# Patient Record
Sex: FEMALE | Race: BLACK OR AFRICAN AMERICAN | NOT HISPANIC OR LATINO | Employment: FULL TIME | RURAL
[De-identification: names, ages, dates, MRNs, and addresses within clinical notes are randomized per-mention and may not be internally consistent; named-entity substitution may affect disease eponyms.]

---

## 2021-09-02 RX ORDER — AMLODIPINE BESYLATE 10 MG/1
10 TABLET ORAL DAILY
COMMUNITY

## 2021-09-02 RX ORDER — OMEPRAZOLE 40 MG/1
40 CAPSULE, DELAYED RELEASE ORAL DAILY
COMMUNITY

## 2021-09-02 RX ORDER — CYCLOBENZAPRINE HCL 10 MG
10 TABLET ORAL 3 TIMES DAILY PRN
COMMUNITY

## 2021-09-02 RX ORDER — PNV NO.95/FERROUS FUM/FOLIC AC 28MG-0.8MG
100 TABLET ORAL DAILY
COMMUNITY

## 2021-09-02 RX ORDER — CETIRIZINE HYDROCHLORIDE 10 MG/1
10 TABLET ORAL DAILY
COMMUNITY

## 2021-09-02 RX ORDER — ALBUTEROL SULFATE 0.83 MG/ML
2.5 SOLUTION RESPIRATORY (INHALATION) EVERY 4 HOURS PRN
COMMUNITY

## 2021-09-02 RX ORDER — FLUTICASONE PROPIONATE 50 MCG
1 SPRAY, SUSPENSION (ML) NASAL DAILY
COMMUNITY

## 2021-09-02 RX ORDER — POTASSIUM CHLORIDE 750 MG/1
10 CAPSULE, EXTENDED RELEASE ORAL ONCE
COMMUNITY

## 2021-09-02 RX ORDER — ERGOCALCIFEROL 1.25 MG/1
50000 CAPSULE ORAL
COMMUNITY

## 2021-09-02 RX ORDER — BUDESONIDE AND FORMOTEROL FUMARATE DIHYDRATE 160; 4.5 UG/1; UG/1
2 AEROSOL RESPIRATORY (INHALATION) EVERY 12 HOURS
COMMUNITY

## 2021-09-02 RX ORDER — VALSARTAN AND HYDROCHLOROTHIAZIDE 320; 25 MG/1; MG/1
1 TABLET, FILM COATED ORAL DAILY
COMMUNITY

## 2021-09-02 RX ORDER — GINGER ROOT/GINGER ROOT EXT 262.5 MG
1 CAPSULE ORAL DAILY
COMMUNITY

## 2021-09-02 RX ORDER — LANOLIN ALCOHOL/MO/W.PET/CERES
400 CREAM (GRAM) TOPICAL 2 TIMES DAILY
COMMUNITY

## 2021-09-02 RX ORDER — ETODOLAC 400 MG/1
400 TABLET, FILM COATED ORAL 2 TIMES DAILY
COMMUNITY

## 2021-09-02 RX ORDER — TRAMADOL HYDROCHLORIDE 50 MG/1
50 TABLET ORAL EVERY 6 HOURS
COMMUNITY
End: 2021-10-04

## 2021-09-02 RX ORDER — ALBUTEROL SULFATE 90 UG/1
2 AEROSOL, METERED RESPIRATORY (INHALATION) EVERY 4 HOURS PRN
COMMUNITY

## 2021-09-03 ENCOUNTER — OFFICE VISIT (OUTPATIENT)
Dept: PAIN MEDICINE | Facility: CLINIC | Age: 51
End: 2021-09-03
Payer: COMMERCIAL

## 2021-09-03 VITALS
HEIGHT: 63 IN | BODY MASS INDEX: 33.84 KG/M2 | HEART RATE: 94 BPM | DIASTOLIC BLOOD PRESSURE: 97 MMHG | WEIGHT: 191 LBS | SYSTOLIC BLOOD PRESSURE: 166 MMHG

## 2021-09-03 DIAGNOSIS — M47.817 SPONDYLOSIS OF LUMBOSACRAL REGION WITHOUT MYELOPATHY OR RADICULOPATHY: Chronic | ICD-10-CM

## 2021-09-03 DIAGNOSIS — Z79.899 ENCOUNTER FOR LONG-TERM (CURRENT) USE OF OTHER MEDICATIONS: Primary | ICD-10-CM

## 2021-09-03 DIAGNOSIS — Z11.59 SCREENING FOR VIRAL DISEASE: ICD-10-CM

## 2021-09-03 DIAGNOSIS — M54.50 CHRONIC BILATERAL LOW BACK PAIN, UNSPECIFIED WHETHER SCIATICA PRESENT: Chronic | ICD-10-CM

## 2021-09-03 DIAGNOSIS — G89.4 CHRONIC PAIN DISORDER: Chronic | ICD-10-CM

## 2021-09-03 DIAGNOSIS — G89.29 CHRONIC BILATERAL LOW BACK PAIN, UNSPECIFIED WHETHER SCIATICA PRESENT: Chronic | ICD-10-CM

## 2021-09-03 LAB

## 2021-09-03 PROCEDURE — 99203 PR OFFICE/OUTPT VISIT, NEW, LEVL III, 30-44 MIN: ICD-10-PCS | Mod: S$PBB,,, | Performed by: PAIN MEDICINE

## 2021-09-03 PROCEDURE — 80324 DRUG SCREEN AMPHETAMINES 1/2: CPT | Mod: ,,, | Performed by: CLINICAL MEDICAL LABORATORY

## 2021-09-03 PROCEDURE — 80354 DRUG SCREENING FENTANYL: CPT | Mod: ,,, | Performed by: CLINICAL MEDICAL LABORATORY

## 2021-09-03 PROCEDURE — 80365 DRUG SCREENING OXYCODONE: CPT | Mod: ,,, | Performed by: CLINICAL MEDICAL LABORATORY

## 2021-09-03 PROCEDURE — 80348 DRUG SCREEN DEFINITIVE 14, URINE: ICD-10-PCS | Mod: ,,, | Performed by: CLINICAL MEDICAL LABORATORY

## 2021-09-03 PROCEDURE — 80353 DRUG SCREEN DEFINITIVE 14, URINE: ICD-10-PCS | Mod: ,,, | Performed by: CLINICAL MEDICAL LABORATORY

## 2021-09-03 PROCEDURE — 80373 DRUG SCREENING TRAMADOL: CPT | Mod: ,,, | Performed by: CLINICAL MEDICAL LABORATORY

## 2021-09-03 PROCEDURE — 80346 DRUG SCREEN DEFINITIVE 14, URINE: ICD-10-PCS | Mod: ,,, | Performed by: CLINICAL MEDICAL LABORATORY

## 2021-09-03 PROCEDURE — 80372 DRUG SCREEN DEFINITIVE 14, URINE: ICD-10-PCS | Mod: ,,, | Performed by: CLINICAL MEDICAL LABORATORY

## 2021-09-03 PROCEDURE — 80324 DRUG SCREEN DEFINITIVE 14, URINE: ICD-10-PCS | Mod: ,,, | Performed by: CLINICAL MEDICAL LABORATORY

## 2021-09-03 PROCEDURE — 80349 DRUG SCREEN DEFINITIVE 14, URINE: ICD-10-PCS | Mod: ,,, | Performed by: CLINICAL MEDICAL LABORATORY

## 2021-09-03 PROCEDURE — 80373 DRUG SCREEN DEFINITIVE 14, URINE: ICD-10-PCS | Mod: ,,, | Performed by: CLINICAL MEDICAL LABORATORY

## 2021-09-03 PROCEDURE — 80365 DRUG SCREEN DEFINITIVE 14, URINE: ICD-10-PCS | Mod: ,,, | Performed by: CLINICAL MEDICAL LABORATORY

## 2021-09-03 PROCEDURE — 80353 DRUG SCREENING COCAINE: CPT | Mod: ,,, | Performed by: CLINICAL MEDICAL LABORATORY

## 2021-09-03 PROCEDURE — 99215 OFFICE O/P EST HI 40 MIN: CPT | Mod: PBBFAC | Performed by: PAIN MEDICINE

## 2021-09-03 PROCEDURE — 80349 CANNABINOIDS NATURAL: CPT | Mod: ,,, | Performed by: CLINICAL MEDICAL LABORATORY

## 2021-09-03 PROCEDURE — 99203 OFFICE O/P NEW LOW 30 MIN: CPT | Mod: S$PBB,,, | Performed by: PAIN MEDICINE

## 2021-09-03 PROCEDURE — 80372 DRUG SCREENING TAPENTADOL: CPT | Mod: ,,, | Performed by: CLINICAL MEDICAL LABORATORY

## 2021-09-03 PROCEDURE — 80358 DRUG SCREENING METHADONE: CPT | Mod: ,,, | Performed by: CLINICAL MEDICAL LABORATORY

## 2021-09-03 PROCEDURE — 80361 DRUG SCREEN DEFINITIVE 14, URINE: ICD-10-PCS | Mod: ,,, | Performed by: CLINICAL MEDICAL LABORATORY

## 2021-09-03 PROCEDURE — 80358 DRUG SCREEN DEFINITIVE 14, URINE: ICD-10-PCS | Mod: ,,, | Performed by: CLINICAL MEDICAL LABORATORY

## 2021-09-03 PROCEDURE — 80356 DRUG SCREEN DEFINITIVE 14, URINE: ICD-10-PCS | Mod: ,,, | Performed by: CLINICAL MEDICAL LABORATORY

## 2021-09-03 PROCEDURE — 80305 DRUG TEST PRSMV DIR OPT OBS: CPT | Mod: PBBFAC | Performed by: PAIN MEDICINE

## 2021-09-03 PROCEDURE — 80354 DRUG SCREEN DEFINITIVE 14, URINE: ICD-10-PCS | Mod: ,,, | Performed by: CLINICAL MEDICAL LABORATORY

## 2021-09-03 PROCEDURE — 80346 BENZODIAZEPINES1-12: CPT | Mod: ,,, | Performed by: CLINICAL MEDICAL LABORATORY

## 2021-09-03 PROCEDURE — 80348 DRUG SCREENING BUPRENORPHINE: CPT | Mod: ,,, | Performed by: CLINICAL MEDICAL LABORATORY

## 2021-09-03 PROCEDURE — 80361 OPIATES 1 OR MORE: CPT | Mod: ,,, | Performed by: CLINICAL MEDICAL LABORATORY

## 2021-09-03 PROCEDURE — 80356 HEROIN METABOLITE: CPT | Mod: ,,, | Performed by: CLINICAL MEDICAL LABORATORY

## 2021-09-13 LAB
6-ACETYLMORPHINE, URINE (RUSH): NEGATIVE 10 NG/ML
7-AMINOCLONAZEPAM, URINE (RUSH): NEGATIVE 25 NG/ML
A-HYDROXYALPRAZOLAM, URINE (RUSH): NEGATIVE 25 NG/ML
ACETYL FENTANYL, URINE (RUSH): NEGATIVE 2.5 NG/ML
ACETYL NORFENTANYL OXALATE, URINE (RUSH): NEGATIVE 5 NG/ML
AMPHET UR QL SCN: NEGATIVE 100 NG/ML
BENZOYLECGONINE, URINE (RUSH): NEGATIVE 100 NG/ML
BUPRENORPHINE UR QL SCN: NEGATIVE 25 NG/ML
CODEINE, URINE (RUSH): NEGATIVE 25 NG/ML
CREAT UR-MCNC: 21 MG/DL (ref 28–219)
EDDP, URINE (RUSH): NEGATIVE 25 NG/ML
FENTANYL, URINE (RUSH): NEGATIVE 2.5 NG/ML
HYDROCODONE, URINE (RUSH): NEGATIVE 25 NG/ML
HYDROMORPHONE, URINE (RUSH): NEGATIVE 25 NG/ML
LORAZEPAM, URINE (RUSH): NEGATIVE 25 NG/ML
METHADONE UR QL SCN: NEGATIVE 25 NG/ML
METHAMPHET UR QL SCN: NEGATIVE 100 NG/ML
MORPHINE, URINE (RUSH): NEGATIVE 25 NG/ML
NORBUPRENORPHINE, URINE (RUSH): NEGATIVE 25 NG/ML
NORDIAZEPAM, URINE (RUSH): NEGATIVE 25 NG/ML
NORFENTANYL OXALATE, URINE (RUSH): NEGATIVE 5 NG/ML
NORHYDROCODONE, URINE (RUSH): NEGATIVE 50 NG/ML
NOROXYCODONE HCL, URINE (RUSH): NEGATIVE 50 NG/ML
OXAZEPAM, URINE (RUSH): NEGATIVE 25 NG/ML
OXYCODONE UR QL SCN: NEGATIVE 25 NG/ML
OXYMORPHONE, URINE (RUSH): NEGATIVE 25 NG/ML
PH UR STRIP: 6 PH UNITS
REFRACTOMETER, URINE TOX: 1
SP GR UR STRIP: <=1.005
TAPENTADOL, URINE (RUSH): NEGATIVE 25 NG/ML
TEMAZEPAM, URINE (RUSH): NEGATIVE 25 NG/ML
THC-COOH, URINE (RUSH): NEGATIVE 25 NG/ML
TRAMADOL, URINE (RUSH): >1000 100 NG/ML

## 2021-09-23 ENCOUNTER — HOSPITAL ENCOUNTER (OUTPATIENT)
Facility: HOSPITAL | Age: 51
Discharge: HOME | End: 2021-09-23
Attending: PAIN MEDICINE | Admitting: PAIN MEDICINE
Payer: COMMERCIAL

## 2021-09-23 ENCOUNTER — ANESTHESIA EVENT (OUTPATIENT)
Dept: PAIN MEDICINE | Facility: HOSPITAL | Age: 51
End: 2021-09-23
Payer: COMMERCIAL

## 2021-09-23 ENCOUNTER — ANESTHESIA (OUTPATIENT)
Dept: PAIN MEDICINE | Facility: HOSPITAL | Age: 51
End: 2021-09-23
Payer: COMMERCIAL

## 2021-09-23 VITALS
SYSTOLIC BLOOD PRESSURE: 124 MMHG | DIASTOLIC BLOOD PRESSURE: 91 MMHG | HEIGHT: 63 IN | TEMPERATURE: 98 F | OXYGEN SATURATION: 100 % | WEIGHT: 190.19 LBS | OXYGEN SATURATION: 98 % | HEART RATE: 81 BPM | BODY MASS INDEX: 33.7 KG/M2 | DIASTOLIC BLOOD PRESSURE: 63 MMHG | RESPIRATION RATE: 11 BRPM | HEART RATE: 91 BPM | SYSTOLIC BLOOD PRESSURE: 108 MMHG

## 2021-09-23 DIAGNOSIS — M47.817 LUMBOSACRAL SPONDYLOSIS WITHOUT MYELOPATHY: ICD-10-CM

## 2021-09-23 PROCEDURE — 27000716 HC OXISENSOR PROBE, ANY SIZE: Performed by: NURSE ANESTHETIST, CERTIFIED REGISTERED

## 2021-09-23 PROCEDURE — 37000009 HC ANESTHESIA EA ADD 15 MINS: Performed by: PAIN MEDICINE

## 2021-09-23 PROCEDURE — 64494 PR INJ DX/THER AGNT PARAVERT FACET JOINT,IMG GUIDE,LUMBAR/SAC, 2ND LEVEL: ICD-10-PCS | Mod: 50,,, | Performed by: PAIN MEDICINE

## 2021-09-23 PROCEDURE — 25000003 PHARM REV CODE 250: Performed by: PAIN MEDICINE

## 2021-09-23 PROCEDURE — 27000284 HC CANNULA NASAL: Performed by: NURSE ANESTHETIST, CERTIFIED REGISTERED

## 2021-09-23 PROCEDURE — 64494 INJ PARAVERT F JNT L/S 2 LEV: CPT | Mod: 50,,, | Performed by: PAIN MEDICINE

## 2021-09-23 PROCEDURE — 63600175 PHARM REV CODE 636 W HCPCS: Performed by: PAIN MEDICINE

## 2021-09-23 PROCEDURE — 25000003 PHARM REV CODE 250: Performed by: NURSE ANESTHETIST, CERTIFIED REGISTERED

## 2021-09-23 PROCEDURE — 64495 INJ PARAVERT F JNT L/S 3 LEV: CPT | Mod: 50 | Performed by: PAIN MEDICINE

## 2021-09-23 PROCEDURE — 27201423 OPTIME MED/SURG SUP & DEVICES STERILE SUPPLY: Performed by: PAIN MEDICINE

## 2021-09-23 PROCEDURE — 64493 INJ PARAVERT F JNT L/S 1 LEV: CPT | Mod: 50 | Performed by: PAIN MEDICINE

## 2021-09-23 PROCEDURE — 64495 PR INJ DX/THER AGNT PARAVERT FACET JOINT,IMG GUIDE,LUMBAR/SAC, ADD LEVEL: ICD-10-PCS | Mod: 50,,, | Performed by: PAIN MEDICINE

## 2021-09-23 PROCEDURE — 64493 INJ PARAVERT F JNT L/S 1 LEV: CPT | Mod: 50,,, | Performed by: PAIN MEDICINE

## 2021-09-23 PROCEDURE — 37000008 HC ANESTHESIA 1ST 15 MINUTES: Performed by: PAIN MEDICINE

## 2021-09-23 PROCEDURE — 64493 PR INJ DX/THER AGNT PARAVERT FACET JOINT,IMG GUIDE,LUMBAR/SAC,1ST LVL: ICD-10-PCS | Mod: 50,,, | Performed by: PAIN MEDICINE

## 2021-09-23 PROCEDURE — D9220A PRA ANESTHESIA: ICD-10-PCS | Mod: ,,, | Performed by: NURSE ANESTHETIST, CERTIFIED REGISTERED

## 2021-09-23 PROCEDURE — 64494 INJ PARAVERT F JNT L/S 2 LEV: CPT | Mod: 50 | Performed by: PAIN MEDICINE

## 2021-09-23 PROCEDURE — D9220A PRA ANESTHESIA: Mod: ,,, | Performed by: NURSE ANESTHETIST, CERTIFIED REGISTERED

## 2021-09-23 PROCEDURE — 64495 INJ PARAVERT F JNT L/S 3 LEV: CPT | Mod: 50,,, | Performed by: PAIN MEDICINE

## 2021-09-23 PROCEDURE — 63600175 PHARM REV CODE 636 W HCPCS: Performed by: NURSE ANESTHETIST, CERTIFIED REGISTERED

## 2021-09-23 RX ORDER — BUPIVACAINE HYDROCHLORIDE 2.5 MG/ML
INJECTION, SOLUTION INFILTRATION; PERINEURAL
Status: DISCONTINUED | OUTPATIENT
Start: 2021-09-23 | End: 2021-09-23 | Stop reason: HOSPADM

## 2021-09-23 RX ORDER — TRIAMCINOLONE ACETONIDE 40 MG/ML
INJECTION, SUSPENSION INTRA-ARTICULAR; INTRAMUSCULAR
Status: DISCONTINUED | OUTPATIENT
Start: 2021-09-23 | End: 2021-09-23 | Stop reason: HOSPADM

## 2021-09-23 RX ORDER — TRAMADOL HYDROCHLORIDE 50 MG/1
50 TABLET ORAL
Qty: 30 TABLET | Refills: 0 | Status: SHIPPED | OUTPATIENT
Start: 2021-09-23 | End: 2021-10-13 | Stop reason: SDUPTHER

## 2021-09-23 RX ORDER — PROPOFOL 10 MG/ML
VIAL (ML) INTRAVENOUS
Status: DISCONTINUED | OUTPATIENT
Start: 2021-09-23 | End: 2021-09-23

## 2021-09-23 RX ORDER — LIDOCAINE HYDROCHLORIDE 20 MG/ML
INJECTION, SOLUTION EPIDURAL; INFILTRATION; INTRACAUDAL; PERINEURAL
Status: DISCONTINUED | OUTPATIENT
Start: 2021-09-23 | End: 2021-09-23

## 2021-09-23 RX ORDER — SODIUM CHLORIDE 9 MG/ML
INJECTION, SOLUTION INTRAVENOUS CONTINUOUS
Status: DISCONTINUED | OUTPATIENT
Start: 2021-09-23 | End: 2021-09-23 | Stop reason: HOSPADM

## 2021-09-23 RX ADMIN — PROPOFOL 20 MG: 10 INJECTION, EMULSION INTRAVENOUS at 11:09

## 2021-09-23 RX ADMIN — PROPOFOL 60 MG: 10 INJECTION, EMULSION INTRAVENOUS at 11:09

## 2021-09-23 RX ADMIN — PROPOFOL 70 MG: 10 INJECTION, EMULSION INTRAVENOUS at 11:09

## 2021-09-23 RX ADMIN — SODIUM CHLORIDE: 9 INJECTION, SOLUTION INTRAVENOUS at 11:09

## 2021-09-23 RX ADMIN — LIDOCAINE HYDROCHLORIDE 70 MG: 20 INJECTION, SOLUTION INTRAVENOUS at 11:09

## 2021-10-04 PROBLEM — M47.817 LUMBOSACRAL SPONDYLOSIS WITHOUT MYELOPATHY: Chronic | Status: ACTIVE | Noted: 2021-09-23

## 2021-10-04 PROBLEM — M89.49 OSTEOARTHROSIS MULTIPLE SITES, NOT SPECIFIED AS GENERALIZED: Chronic | Status: ACTIVE | Noted: 2021-10-04

## 2021-10-13 ENCOUNTER — OFFICE VISIT (OUTPATIENT)
Dept: PAIN MEDICINE | Facility: CLINIC | Age: 51
End: 2021-10-13
Payer: COMMERCIAL

## 2021-10-13 VITALS
RESPIRATION RATE: 18 BRPM | HEIGHT: 63 IN | BODY MASS INDEX: 33.49 KG/M2 | DIASTOLIC BLOOD PRESSURE: 90 MMHG | SYSTOLIC BLOOD PRESSURE: 173 MMHG | WEIGHT: 189 LBS | HEART RATE: 83 BPM

## 2021-10-13 DIAGNOSIS — M47.817 LUMBOSACRAL SPONDYLOSIS WITHOUT MYELOPATHY: Primary | Chronic | ICD-10-CM

## 2021-10-13 DIAGNOSIS — M89.49 OSTEOARTHROSIS MULTIPLE SITES, NOT SPECIFIED AS GENERALIZED: Chronic | ICD-10-CM

## 2021-10-13 PROCEDURE — 99214 PR OFFICE/OUTPT VISIT, EST, LEVL IV, 30-39 MIN: ICD-10-PCS | Mod: S$PBB,,, | Performed by: PHYSICIAN ASSISTANT

## 2021-10-13 PROCEDURE — 99215 OFFICE O/P EST HI 40 MIN: CPT | Mod: PBBFAC | Performed by: PHYSICIAN ASSISTANT

## 2021-10-13 PROCEDURE — 99214 OFFICE O/P EST MOD 30 MIN: CPT | Mod: S$PBB,,, | Performed by: PHYSICIAN ASSISTANT

## 2021-10-13 RX ORDER — TRAMADOL HYDROCHLORIDE 50 MG/1
50 TABLET ORAL
Qty: 30 TABLET | Refills: 0 | Status: SHIPPED | OUTPATIENT
Start: 2021-10-13 | End: 2021-11-11 | Stop reason: SDUPTHER

## 2021-10-26 ENCOUNTER — ANESTHESIA (OUTPATIENT)
Dept: PAIN MEDICINE | Facility: HOSPITAL | Age: 51
End: 2021-10-26
Payer: COMMERCIAL

## 2021-10-26 ENCOUNTER — ANESTHESIA EVENT (OUTPATIENT)
Dept: PAIN MEDICINE | Facility: HOSPITAL | Age: 51
End: 2021-10-26
Payer: COMMERCIAL

## 2021-10-26 ENCOUNTER — HOSPITAL ENCOUNTER (OUTPATIENT)
Facility: HOSPITAL | Age: 51
Discharge: HOME | End: 2021-10-26
Attending: PAIN MEDICINE | Admitting: PAIN MEDICINE
Payer: COMMERCIAL

## 2021-10-26 VITALS
SYSTOLIC BLOOD PRESSURE: 167 MMHG | DIASTOLIC BLOOD PRESSURE: 101 MMHG | OXYGEN SATURATION: 98 % | RESPIRATION RATE: 15 BRPM | TEMPERATURE: 99 F | BODY MASS INDEX: 33.73 KG/M2 | HEART RATE: 85 BPM | HEIGHT: 63 IN | WEIGHT: 190.38 LBS

## 2021-10-26 DIAGNOSIS — M47.816 SPONDYLOSIS OF LUMBAR REGION WITHOUT MYELOPATHY OR RADICULOPATHY: ICD-10-CM

## 2021-10-26 PROCEDURE — D9220A PRA ANESTHESIA: ICD-10-PCS | Mod: ,,, | Performed by: NURSE ANESTHETIST, CERTIFIED REGISTERED

## 2021-10-26 PROCEDURE — 64494 INJ PARAVERT F JNT L/S 2 LEV: CPT | Mod: 50 | Performed by: PAIN MEDICINE

## 2021-10-26 PROCEDURE — 64493 PR INJ DX/THER AGNT PARAVERT FACET JOINT,IMG GUIDE,LUMBAR/SAC,1ST LVL: ICD-10-PCS | Mod: 50,,, | Performed by: PAIN MEDICINE

## 2021-10-26 PROCEDURE — 63600175 PHARM REV CODE 636 W HCPCS: Performed by: NURSE ANESTHETIST, CERTIFIED REGISTERED

## 2021-10-26 PROCEDURE — 37000008 HC ANESTHESIA 1ST 15 MINUTES: Performed by: PAIN MEDICINE

## 2021-10-26 PROCEDURE — 25000003 PHARM REV CODE 250: Performed by: PAIN MEDICINE

## 2021-10-26 PROCEDURE — 64494 INJ PARAVERT F JNT L/S 2 LEV: CPT | Mod: 50,,, | Performed by: PAIN MEDICINE

## 2021-10-26 PROCEDURE — 63600175 PHARM REV CODE 636 W HCPCS: Performed by: PAIN MEDICINE

## 2021-10-26 PROCEDURE — 64493 INJ PARAVERT F JNT L/S 1 LEV: CPT | Mod: 50 | Performed by: PAIN MEDICINE

## 2021-10-26 PROCEDURE — 64493 INJ PARAVERT F JNT L/S 1 LEV: CPT | Mod: 50,,, | Performed by: PAIN MEDICINE

## 2021-10-26 PROCEDURE — 64494 PR INJ DX/THER AGNT PARAVERT FACET JOINT,IMG GUIDE,LUMBAR/SAC, 2ND LEVEL: ICD-10-PCS | Mod: 50,,, | Performed by: PAIN MEDICINE

## 2021-10-26 PROCEDURE — 25500020 PHARM REV CODE 255: Performed by: PAIN MEDICINE

## 2021-10-26 PROCEDURE — D9220A PRA ANESTHESIA: Mod: ,,, | Performed by: NURSE ANESTHETIST, CERTIFIED REGISTERED

## 2021-10-26 PROCEDURE — 27000284 HC CANNULA NASAL: Performed by: NURSE ANESTHETIST, CERTIFIED REGISTERED

## 2021-10-26 PROCEDURE — 25000003 PHARM REV CODE 250: Performed by: NURSE ANESTHETIST, CERTIFIED REGISTERED

## 2021-10-26 PROCEDURE — 27201423 OPTIME MED/SURG SUP & DEVICES STERILE SUPPLY: Performed by: PAIN MEDICINE

## 2021-10-26 RX ORDER — LIDOCAINE HYDROCHLORIDE 20 MG/ML
INJECTION, SOLUTION EPIDURAL; INFILTRATION; INTRACAUDAL; PERINEURAL
Status: DISCONTINUED | OUTPATIENT
Start: 2021-10-26 | End: 2021-10-26

## 2021-10-26 RX ORDER — SODIUM CHLORIDE 9 MG/ML
INJECTION, SOLUTION INTRAVENOUS CONTINUOUS
Status: DISCONTINUED | OUTPATIENT
Start: 2021-10-26 | End: 2021-10-26 | Stop reason: HOSPADM

## 2021-10-26 RX ORDER — BUPIVACAINE HYDROCHLORIDE 2.5 MG/ML
INJECTION, SOLUTION INFILTRATION; PERINEURAL
Status: DISCONTINUED | OUTPATIENT
Start: 2021-10-26 | End: 2021-10-26 | Stop reason: HOSPADM

## 2021-10-26 RX ORDER — PROPOFOL 10 MG/ML
INJECTION, EMULSION INTRAVENOUS
Status: DISCONTINUED | OUTPATIENT
Start: 2021-10-26 | End: 2021-10-26

## 2021-10-26 RX ORDER — TRIAMCINOLONE ACETONIDE 40 MG/ML
INJECTION, SUSPENSION INTRA-ARTICULAR; INTRAMUSCULAR
Status: DISCONTINUED | OUTPATIENT
Start: 2021-10-26 | End: 2021-10-26 | Stop reason: HOSPADM

## 2021-10-26 RX ADMIN — PROPOFOL 100 MG: 10 INJECTION, EMULSION INTRAVENOUS at 02:10

## 2021-10-26 RX ADMIN — PROPOFOL 30 MG: 10 INJECTION, EMULSION INTRAVENOUS at 02:10

## 2021-10-26 RX ADMIN — SODIUM CHLORIDE: 9 INJECTION, SOLUTION INTRAVENOUS at 02:10

## 2021-10-26 RX ADMIN — LIDOCAINE HYDROCHLORIDE 50 MG: 20 INJECTION, SOLUTION INTRAVENOUS at 02:10

## 2021-10-26 RX ADMIN — PROPOFOL 50 MG: 10 INJECTION, EMULSION INTRAVENOUS at 02:10

## 2021-11-11 ENCOUNTER — OFFICE VISIT (OUTPATIENT)
Dept: PAIN MEDICINE | Facility: CLINIC | Age: 51
End: 2021-11-11
Payer: COMMERCIAL

## 2021-11-11 VITALS
HEIGHT: 63 IN | RESPIRATION RATE: 20 BRPM | BODY MASS INDEX: 33.84 KG/M2 | WEIGHT: 191 LBS | HEART RATE: 83 BPM | DIASTOLIC BLOOD PRESSURE: 115 MMHG | SYSTOLIC BLOOD PRESSURE: 196 MMHG

## 2021-11-11 DIAGNOSIS — M89.49 OSTEOARTHROSIS MULTIPLE SITES, NOT SPECIFIED AS GENERALIZED: Chronic | ICD-10-CM

## 2021-11-11 DIAGNOSIS — M47.817 LUMBOSACRAL SPONDYLOSIS WITHOUT MYELOPATHY: Primary | Chronic | ICD-10-CM

## 2021-11-11 PROCEDURE — 99214 OFFICE O/P EST MOD 30 MIN: CPT | Mod: S$PBB,,, | Performed by: PHYSICIAN ASSISTANT

## 2021-11-11 PROCEDURE — 99214 PR OFFICE/OUTPT VISIT, EST, LEVL IV, 30-39 MIN: ICD-10-PCS | Mod: S$PBB,,, | Performed by: PHYSICIAN ASSISTANT

## 2021-11-11 PROCEDURE — 99215 OFFICE O/P EST HI 40 MIN: CPT | Mod: PBBFAC | Performed by: PHYSICIAN ASSISTANT

## 2021-11-11 RX ORDER — TRAMADOL HYDROCHLORIDE 50 MG/1
50 TABLET ORAL EVERY 12 HOURS
Qty: 60 TABLET | Refills: 0 | Status: SHIPPED | OUTPATIENT
Start: 2021-11-17 | End: 2021-12-09 | Stop reason: SDUPTHER

## 2021-12-07 ENCOUNTER — TELEPHONE (OUTPATIENT)
Dept: PAIN MEDICINE | Facility: CLINIC | Age: 51
End: 2021-12-07
Payer: COMMERCIAL

## 2021-12-09 ENCOUNTER — ANESTHESIA (OUTPATIENT)
Dept: PAIN MEDICINE | Facility: HOSPITAL | Age: 51
End: 2021-12-09
Payer: COMMERCIAL

## 2021-12-09 ENCOUNTER — ANESTHESIA EVENT (OUTPATIENT)
Dept: PAIN MEDICINE | Facility: HOSPITAL | Age: 51
End: 2021-12-09
Payer: COMMERCIAL

## 2021-12-09 ENCOUNTER — HOSPITAL ENCOUNTER (OUTPATIENT)
Facility: HOSPITAL | Age: 51
Discharge: HOME | End: 2021-12-09
Attending: PAIN MEDICINE | Admitting: PAIN MEDICINE
Payer: COMMERCIAL

## 2021-12-09 VITALS
WEIGHT: 188 LBS | RESPIRATION RATE: 13 BRPM | DIASTOLIC BLOOD PRESSURE: 100 MMHG | SYSTOLIC BLOOD PRESSURE: 156 MMHG | OXYGEN SATURATION: 98 % | HEART RATE: 89 BPM | BODY MASS INDEX: 33.31 KG/M2 | HEIGHT: 63 IN | TEMPERATURE: 99 F

## 2021-12-09 VITALS
HEART RATE: 96 BPM | OXYGEN SATURATION: 99 % | RESPIRATION RATE: 25 BRPM | DIASTOLIC BLOOD PRESSURE: 87 MMHG | SYSTOLIC BLOOD PRESSURE: 136 MMHG

## 2021-12-09 DIAGNOSIS — M89.49 OSTEOARTHROSIS MULTIPLE SITES, NOT SPECIFIED AS GENERALIZED: Chronic | ICD-10-CM

## 2021-12-09 DIAGNOSIS — M47.817 LUMBOSACRAL SPONDYLOSIS WITHOUT MYELOPATHY: Chronic | ICD-10-CM

## 2021-12-09 DIAGNOSIS — M47.817 SPONDYLOSIS OF LUMBOSACRAL REGION WITHOUT MYELOPATHY OR RADICULOPATHY: ICD-10-CM

## 2021-12-09 PROCEDURE — D9220A PRA ANESTHESIA: ICD-10-PCS | Mod: ,,, | Performed by: NURSE ANESTHETIST, CERTIFIED REGISTERED

## 2021-12-09 PROCEDURE — 37000008 HC ANESTHESIA 1ST 15 MINUTES: Performed by: PAIN MEDICINE

## 2021-12-09 PROCEDURE — 64636 PR DESTROY L/S FACET JNT ADDL: ICD-10-PCS | Mod: RT,,, | Performed by: PAIN MEDICINE

## 2021-12-09 PROCEDURE — 64635 DESTROY LUMB/SAC FACET JNT: CPT | Mod: RT | Performed by: PAIN MEDICINE

## 2021-12-09 PROCEDURE — 63600175 PHARM REV CODE 636 W HCPCS: Performed by: PAIN MEDICINE

## 2021-12-09 PROCEDURE — 64635 PR DESTROY LUMB/SAC FACET JNT: ICD-10-PCS | Mod: RT,,, | Performed by: PAIN MEDICINE

## 2021-12-09 PROCEDURE — 25000003 PHARM REV CODE 250: Performed by: NURSE ANESTHETIST, CERTIFIED REGISTERED

## 2021-12-09 PROCEDURE — 25000003 PHARM REV CODE 250: Performed by: PAIN MEDICINE

## 2021-12-09 PROCEDURE — 27000284 HC CANNULA NASAL: Performed by: NURSE ANESTHETIST, CERTIFIED REGISTERED

## 2021-12-09 PROCEDURE — 64636 DESTROY L/S FACET JNT ADDL: CPT | Mod: RT,,, | Performed by: PAIN MEDICINE

## 2021-12-09 PROCEDURE — 63600175 PHARM REV CODE 636 W HCPCS: Performed by: NURSE ANESTHETIST, CERTIFIED REGISTERED

## 2021-12-09 PROCEDURE — 64635 DESTROY LUMB/SAC FACET JNT: CPT | Mod: RT,,, | Performed by: PAIN MEDICINE

## 2021-12-09 PROCEDURE — 64636 DESTROY L/S FACET JNT ADDL: CPT | Performed by: PAIN MEDICINE

## 2021-12-09 PROCEDURE — 37000009 HC ANESTHESIA EA ADD 15 MINS: Performed by: PAIN MEDICINE

## 2021-12-09 PROCEDURE — 27000716 HC OXISENSOR PROBE, ANY SIZE: Performed by: NURSE ANESTHETIST, CERTIFIED REGISTERED

## 2021-12-09 PROCEDURE — 27201423 OPTIME MED/SURG SUP & DEVICES STERILE SUPPLY: Performed by: PAIN MEDICINE

## 2021-12-09 PROCEDURE — D9220A PRA ANESTHESIA: Mod: ,,, | Performed by: NURSE ANESTHETIST, CERTIFIED REGISTERED

## 2021-12-09 RX ORDER — SODIUM CHLORIDE 9 MG/ML
INJECTION, SOLUTION INTRAVENOUS CONTINUOUS
Status: DISCONTINUED | OUTPATIENT
Start: 2021-12-09 | End: 2021-12-09

## 2021-12-09 RX ORDER — TRIAMCINOLONE ACETONIDE 40 MG/ML
INJECTION, SUSPENSION INTRA-ARTICULAR; INTRAMUSCULAR
Status: DISCONTINUED | OUTPATIENT
Start: 2021-12-09 | End: 2021-12-09 | Stop reason: HOSPADM

## 2021-12-09 RX ORDER — BUPIVACAINE HYDROCHLORIDE 2.5 MG/ML
INJECTION, SOLUTION INFILTRATION; PERINEURAL
Status: DISCONTINUED | OUTPATIENT
Start: 2021-12-09 | End: 2021-12-09 | Stop reason: HOSPADM

## 2021-12-09 RX ORDER — ORPHENADRINE CITRATE 30 MG/ML
INJECTION INTRAMUSCULAR; INTRAVENOUS
Status: DISCONTINUED | OUTPATIENT
Start: 2021-12-09 | End: 2021-12-09

## 2021-12-09 RX ORDER — TRAMADOL HYDROCHLORIDE 50 MG/1
50 TABLET ORAL EVERY 12 HOURS PRN
Qty: 60 TABLET | Refills: 0 | Status: SHIPPED | OUTPATIENT
Start: 2021-12-10 | End: 2022-01-10 | Stop reason: SDUPTHER

## 2021-12-09 RX ORDER — PROPOFOL 10 MG/ML
VIAL (ML) INTRAVENOUS
Status: DISCONTINUED | OUTPATIENT
Start: 2021-12-09 | End: 2021-12-09

## 2021-12-09 RX ORDER — LIDOCAINE HYDROCHLORIDE 20 MG/ML
INJECTION, SOLUTION EPIDURAL; INFILTRATION; INTRACAUDAL; PERINEURAL
Status: DISCONTINUED | OUTPATIENT
Start: 2021-12-09 | End: 2021-12-09

## 2021-12-09 RX ORDER — ONDANSETRON 2 MG/ML
4 INJECTION INTRAMUSCULAR; INTRAVENOUS ONCE
Status: COMPLETED | OUTPATIENT
Start: 2021-12-09 | End: 2021-12-09

## 2021-12-09 RX ORDER — SODIUM CHLORIDE 9 MG/ML
INJECTION, SOLUTION INTRAVENOUS CONTINUOUS
Status: ACTIVE | OUTPATIENT
Start: 2021-12-09

## 2021-12-09 RX ADMIN — LIDOCAINE HYDROCHLORIDE 70 MG: 20 INJECTION, SOLUTION INTRAVENOUS at 01:12

## 2021-12-09 RX ADMIN — SODIUM CHLORIDE: 9 INJECTION, SOLUTION INTRAVENOUS at 01:12

## 2021-12-09 RX ADMIN — PROPOFOL 70 MG: 10 INJECTION, EMULSION INTRAVENOUS at 01:12

## 2021-12-09 RX ADMIN — PROPOFOL 60 MG: 10 INJECTION, EMULSION INTRAVENOUS at 01:12

## 2021-12-09 RX ADMIN — ORPHENADRINE CITRATE 60 MG: 30 INJECTION INTRAMUSCULAR; INTRAVENOUS at 01:12

## 2021-12-09 RX ADMIN — ONDANSETRON 4 MG: 2 INJECTION INTRAMUSCULAR; INTRAVENOUS at 01:12

## 2021-12-23 ENCOUNTER — HOSPITAL ENCOUNTER (OUTPATIENT)
Facility: HOSPITAL | Age: 51
Discharge: HOME | End: 2021-12-23
Attending: PAIN MEDICINE | Admitting: PAIN MEDICINE
Payer: COMMERCIAL

## 2021-12-23 ENCOUNTER — ANESTHESIA EVENT (OUTPATIENT)
Dept: PAIN MEDICINE | Facility: HOSPITAL | Age: 51
End: 2021-12-23
Payer: COMMERCIAL

## 2021-12-23 ENCOUNTER — ANESTHESIA (OUTPATIENT)
Dept: PAIN MEDICINE | Facility: HOSPITAL | Age: 51
End: 2021-12-23
Payer: COMMERCIAL

## 2021-12-23 VITALS
TEMPERATURE: 99 F | HEART RATE: 87 BPM | WEIGHT: 189 LBS | DIASTOLIC BLOOD PRESSURE: 96 MMHG | HEIGHT: 63 IN | SYSTOLIC BLOOD PRESSURE: 132 MMHG | BODY MASS INDEX: 33.49 KG/M2 | SYSTOLIC BLOOD PRESSURE: 162 MMHG | RESPIRATION RATE: 19 BRPM | HEART RATE: 78 BPM | RESPIRATION RATE: 21 BRPM | OXYGEN SATURATION: 99 % | OXYGEN SATURATION: 100 % | DIASTOLIC BLOOD PRESSURE: 88 MMHG

## 2021-12-23 DIAGNOSIS — M47.817 SPONDYLOSIS OF LUMBOSACRAL REGION WITHOUT MYELOPATHY OR RADICULOPATHY: ICD-10-CM

## 2021-12-23 PROCEDURE — 64636 DESTROY L/S FACET JNT ADDL: CPT | Mod: 59,LT

## 2021-12-23 PROCEDURE — D9220A PRA ANESTHESIA: ICD-10-PCS | Mod: ,,, | Performed by: NURSE ANESTHETIST, CERTIFIED REGISTERED

## 2021-12-23 PROCEDURE — 64635 PR DESTROY LUMB/SAC FACET JNT: ICD-10-PCS | Mod: LT,,, | Performed by: PAIN MEDICINE

## 2021-12-23 PROCEDURE — 37000008 HC ANESTHESIA 1ST 15 MINUTES: Performed by: PAIN MEDICINE

## 2021-12-23 PROCEDURE — 64635 DESTROY LUMB/SAC FACET JNT: CPT | Mod: LT | Performed by: PAIN MEDICINE

## 2021-12-23 PROCEDURE — 27000284 HC CANNULA NASAL: Performed by: NURSE ANESTHETIST, CERTIFIED REGISTERED

## 2021-12-23 PROCEDURE — 64636 PR DESTROY L/S FACET JNT ADDL: ICD-10-PCS | Mod: LT,,, | Performed by: PAIN MEDICINE

## 2021-12-23 PROCEDURE — 37000009 HC ANESTHESIA EA ADD 15 MINS: Performed by: PAIN MEDICINE

## 2021-12-23 PROCEDURE — 64635 DESTROY LUMB/SAC FACET JNT: CPT | Mod: LT,,, | Performed by: PAIN MEDICINE

## 2021-12-23 PROCEDURE — 25000003 PHARM REV CODE 250: Performed by: NURSE ANESTHETIST, CERTIFIED REGISTERED

## 2021-12-23 PROCEDURE — 63600175 PHARM REV CODE 636 W HCPCS: Performed by: NURSE ANESTHETIST, CERTIFIED REGISTERED

## 2021-12-23 PROCEDURE — 64636 DESTROY L/S FACET JNT ADDL: CPT | Mod: LT | Performed by: PAIN MEDICINE

## 2021-12-23 PROCEDURE — 27000716 HC OXISENSOR PROBE, ANY SIZE: Performed by: NURSE ANESTHETIST, CERTIFIED REGISTERED

## 2021-12-23 PROCEDURE — 27201423 OPTIME MED/SURG SUP & DEVICES STERILE SUPPLY: Performed by: PAIN MEDICINE

## 2021-12-23 PROCEDURE — 64636 DESTROY L/S FACET JNT ADDL: CPT | Mod: LT,,, | Performed by: PAIN MEDICINE

## 2021-12-23 PROCEDURE — D9220A PRA ANESTHESIA: Mod: ,,, | Performed by: NURSE ANESTHETIST, CERTIFIED REGISTERED

## 2021-12-23 RX ORDER — ORPHENADRINE CITRATE 30 MG/ML
INJECTION INTRAMUSCULAR; INTRAVENOUS
Status: DISCONTINUED | OUTPATIENT
Start: 2021-12-23 | End: 2021-12-23

## 2021-12-23 RX ORDER — SODIUM CHLORIDE 9 MG/ML
INJECTION, SOLUTION INTRAVENOUS CONTINUOUS
Status: DISCONTINUED | OUTPATIENT
Start: 2021-12-23 | End: 2021-12-23 | Stop reason: HOSPADM

## 2021-12-23 RX ORDER — LIDOCAINE HYDROCHLORIDE 20 MG/ML
INJECTION, SOLUTION EPIDURAL; INFILTRATION; INTRACAUDAL; PERINEURAL
Status: DISCONTINUED | OUTPATIENT
Start: 2021-12-23 | End: 2021-12-23

## 2021-12-23 RX ORDER — PROPOFOL 10 MG/ML
VIAL (ML) INTRAVENOUS
Status: DISCONTINUED | OUTPATIENT
Start: 2021-12-23 | End: 2021-12-23

## 2021-12-23 RX ADMIN — PROPOFOL 40 MG: 10 INJECTION, EMULSION INTRAVENOUS at 04:12

## 2021-12-23 RX ADMIN — PROPOFOL 50 MG: 10 INJECTION, EMULSION INTRAVENOUS at 03:12

## 2021-12-23 RX ADMIN — PROPOFOL 40 MG: 10 INJECTION, EMULSION INTRAVENOUS at 03:12

## 2021-12-23 RX ADMIN — ORPHENADRINE CITRATE 60 MG: 30 INJECTION INTRAMUSCULAR; INTRAVENOUS at 03:12

## 2021-12-23 RX ADMIN — LIDOCAINE HYDROCHLORIDE 8 MG: 20 INJECTION, SOLUTION INTRAVENOUS at 03:12

## 2021-12-23 RX ADMIN — SODIUM CHLORIDE: 9 INJECTION, SOLUTION INTRAVENOUS at 03:12

## 2022-01-10 NOTE — PROGRESS NOTES
Disclaimer:  This note has been generated using voice recognition software.  There may be type of graft focal areas that have been missed during a proof reading      Subjective:      Patient ID: Miriam Winter is a 51 y.o. female.    Chief Complaint: Back Pain    Pain  This is a chronic problem. The current episode started more than 1 year ago. The problem occurs daily. The problem has been waxing and waning. Associated symptoms include arthralgias. Pertinent negatives include no change in bowel habit, chest pain, chills, coughing, diaphoresis, fatigue, neck pain, rash, sore throat, vertigo or vomiting.     Review of Systems   Constitutional: Negative for appetite change, chills, diaphoresis, fatigue and unexpected weight change.   HENT: Negative for drooling, ear discharge, ear pain, facial swelling, nosebleeds, sore throat, trouble swallowing, voice change and goiter.    Eyes: Negative for photophobia, pain, discharge, redness and visual disturbance.   Respiratory: Negative for apnea, cough, choking, chest tightness, shortness of breath, wheezing and stridor.    Cardiovascular: Negative for chest pain, palpitations and leg swelling.   Gastrointestinal: Negative for abdominal distention, change in bowel habit, diarrhea, rectal pain, vomiting, fecal incontinence and change in bowel habit.   Endocrine: Negative for cold intolerance, heat intolerance, polydipsia, polyphagia and polyuria.   Genitourinary: Negative for bladder incontinence, dysuria, flank pain, frequency and hot flashes.   Musculoskeletal: Positive for arthralgias, back pain and leg pain. Negative for neck pain and neck stiffness.   Integumentary:  Negative for color change, pallor and rash.   Allergic/Immunologic: Negative for immunocompromised state.   Neurological: Negative for dizziness, vertigo, seizures, syncope, facial asymmetry, speech difficulty, light-headedness, disturbances in coordination, memory loss and coordination difficulties.  "  Hematological: Negative for adenopathy. Does not bruise/bleed easily.   Psychiatric/Behavioral: Negative for agitation, behavioral problems, confusion, decreased concentration, dysphoric mood, hallucinations, self-injury and suicidal ideas. The patient is not nervous/anxious and is not hyperactive.             Objective:  Vitals:    01/11/22 1040   BP: (!) 177/112   Pulse: 86   Resp: 18   Weight: 82.6 kg (182 lb)   Height: 5' 3" (1.6 m)   PainSc: 0-No pain         Physical Exam  Vitals and nursing note reviewed. Exam conducted with a chaperone present.   Constitutional:       General: She is awake.      Appearance: Normal appearance. She is not ill-appearing or diaphoretic.   HENT:      Head: Normocephalic and atraumatic.      Nose: Nose normal.      Mouth/Throat:      Mouth: Mucous membranes are moist.      Pharynx: Oropharynx is clear.   Eyes:      Conjunctiva/sclera: Conjunctivae normal.      Pupils: Pupils are equal, round, and reactive to light.   Cardiovascular:      Rate and Rhythm: Normal rate.   Pulmonary:      Effort: Pulmonary effort is normal. No respiratory distress.   Abdominal:      Palpations: Abdomen is soft.      Tenderness: There is no guarding.   Musculoskeletal:         General: No signs of injury. Normal range of motion.      Cervical back: Normal range of motion and neck supple. No rigidity.   Skin:     General: Skin is warm and dry.      Coloration: Skin is not jaundiced or pale.   Neurological:      General: No focal deficit present.      Mental Status: She is alert and oriented to person, place, and time. Mental status is at baseline.      Cranial Nerves: Cranial nerves are intact. No cranial nerve deficit (II-XII).   Psychiatric:         Mood and Affect: Mood normal.         Behavior: Behavior normal. Behavior is cooperative.         Thought Content: Thought content normal.           [place] [facility] Pain [facility]  See OP Notes for results.     IMPRESSION: See OP Notes for results. "     This procedure was auto-finalized by: Virtual Radiologist       Office Visit on 09/03/2021   Component Date Value Ref Range Status    POC Amphetamines 09/03/2021 Negative  Negative, Inconclusive Final    POC Barbiturates 09/03/2021 Negative  Negative, Inconclusive Final    POC Benzodiazepines 09/03/2021 Negative  Negative, Inconclusive Final    POC Cocaine 09/03/2021 Negative  Negative, Inconclusive Final    POC THC 09/03/2021 Negative  Negative, Inconclusive Final    POC Methadone 09/03/2021 Negative  Negative, Inconclusive Final    POC Methamphetamine 09/03/2021 Negative  Negative, Inconclusive Final    POC Opiates 09/03/2021 Negative  Negative, Inconclusive Final    POC Oxycodone 09/03/2021 Negative  Negative, Inconclusive Final    POC Phencyclidine 09/03/2021 Negative  Negative, Inconclusive Final    POC Methylenedioxymethamphetamine * 09/03/2021 Negative  Negative, Inconclusive Final    POC Tricyclic Antidepressants 09/03/2021 Negative  Negative, Inconclusive Final    POC Buprenorphine 09/03/2021 Negative   Final     Acceptable 09/03/2021 Yes   Final    POC Temperature (Urine) 09/03/2021 96   Final    pH, UA 09/03/2021 6.0  5.0, 5.5, 6.0, 6.5, 7.0, 7.5, 8.0 pH Units Final    Specific Gravity, UA 09/03/2021 <=1.005  <=1.005, 1.010, 1.015, 1.020, 1.025, 1.030 Final    Creatinine, Urine 09/03/2021 21* 28 - 219 mg/dL Final    Refractometer, Urine 09/03/2021 1.004   Final    6-Acetylmorphine 09/03/2021 Negative  10 ng/mL Final    7-Aminoclonazepam 09/03/2021 Negative  Negative 25 ng/mL Final    a-Hydroxyalprazolam 09/03/2021 Negative  25 ng/mL Final    Acetyl Fentanyl 09/03/2021 Negative  2.5 ng/mL Final    Acetyl Norfentanyl Oxalate 09/03/2021 Negative  5 ng/mL Final    Benzoylecgonine 09/03/2021 Negative  100 ng/mL Final    Buprenorphine 09/03/2021 Negative  25 ng/mL Final    Codeine 09/03/2021 Negative  25 ng/mL Final    EDDP 09/03/2021 Negative  25 ng/mL Final     Fentanyl 09/03/2021 Negative  2.5 ng/mL Final    Hydrocodone 09/03/2021 Negative  25 ng/mL Final    Hydromorphone 09/03/2021 Negative  25 ng/mL Final    Lorazepam 09/03/2021 Negative  25 ng/mL Final    Morphine 09/03/2021 Negative  25 ng/mL Final    Norbuprenorphine 09/03/2021 Negative  25 ng/mL Final    Nordiazepam 09/03/2021 Negative  25 ng/mL Final    Norfentanyl Oxalate 09/03/2021 Negative  5 ng/mL Final    Norhydrocodone 09/03/2021 Negative  50 ng/mL Final    Noroxycodone HCL 09/03/2021 Negative  50 ng/mL Final    Oxazepam 09/03/2021 Negative  25 ng/mL Final    Oxymorphone 09/03/2021 Negative  25 ng/mL Final    Tapentadol 09/03/2021 Negative  25 ng/mL Final    Temazepam 09/03/2021 Negative  25 ng/mL Final    THC-COOH 09/03/2021 Negative  25 ng/mL Final    Tramadol 09/03/2021 >1,000.0* <100.0 100 ng/mL Final    Amphetamine, Urine 09/03/2021 Negative  Negative 100 ng/mL Final    Methamphetamines, Urine 09/03/2021 Negative  Negative 100 ng/mL Final    Methadone, Urine 09/03/2021 Negative  Negative 25 ng/mL Final    Oxycodone, Urine 09/03/2021 Negative  Negative 25 ng/mL Final           Assessment:      1. Lumbosacral spondylosis without myelopathy    2. Osteoarthrosis multiple sites, not specified as generalized    3. Encounter for long-term (current) use of other medications          Orders Placed This Encounter   Procedures    POCT Urine Drug Screen Presump     Interpretive Information:     Negative:  No drug detected at the cut off level.   Positive:  This result represents presumptive positive for the   tested drug, other substances may yield a positive response other   than the analyte of interest. This result should be utilized for   diagnostic purpose only. Confirmation testing will be performed upon physician request only.            A's of Opioid Risk Assessment  Activity:Patient can perform ADL.   Analgesia:Patients pain is partially controlled by current medication. Patient has tried  OTC medications such as Tylenol and Ibuprofen with out relief.   Adverse Effects: Patient denies constipation or sedation.  Aberrant Behavior:  reviewed with no aberrant drug seeking/taking behavior.  Overdose reversal drug naloxone discussed    Drug screen reviewed      Requested Prescriptions     Signed Prescriptions Disp Refills    traMADoL (ULTRAM) 50 mg tablet 60 tablet 1     Sig: Take 1 tablet (50 mg total) by mouth every 12 (twelve) hours.         Plan:    Presumptive drug screen negative, this is expected result, patient takes tramadol, cup does not test for tramadol    Follow-up after lumbar RFTC December 23, 2021, December 9, 2021, she states she had 80% relief initially maintaining 70% relief with time    At this point she would like to continue with conservative management    Continue home exercise program as directed    Tramadol 50 mg 1 p.o. q.12 hours+    Follow-up 2 months    Dr. Zambrano, December 2022    Bring original prescription medication bottles/container/box with labels to each visit

## 2022-01-11 ENCOUNTER — OFFICE VISIT (OUTPATIENT)
Dept: PAIN MEDICINE | Facility: CLINIC | Age: 52
End: 2022-01-11
Payer: COMMERCIAL

## 2022-01-11 VITALS
RESPIRATION RATE: 18 BRPM | HEIGHT: 63 IN | BODY MASS INDEX: 32.25 KG/M2 | SYSTOLIC BLOOD PRESSURE: 177 MMHG | HEART RATE: 86 BPM | DIASTOLIC BLOOD PRESSURE: 112 MMHG | WEIGHT: 182 LBS

## 2022-01-11 DIAGNOSIS — M47.817 LUMBOSACRAL SPONDYLOSIS WITHOUT MYELOPATHY: Primary | Chronic | ICD-10-CM

## 2022-01-11 DIAGNOSIS — M89.49 OSTEOARTHROSIS MULTIPLE SITES, NOT SPECIFIED AS GENERALIZED: Chronic | ICD-10-CM

## 2022-01-11 DIAGNOSIS — Z79.899 ENCOUNTER FOR LONG-TERM (CURRENT) USE OF OTHER MEDICATIONS: ICD-10-CM

## 2022-01-11 LAB

## 2022-01-11 PROCEDURE — 99215 OFFICE O/P EST HI 40 MIN: CPT | Mod: PBBFAC | Performed by: PHYSICIAN ASSISTANT

## 2022-01-11 PROCEDURE — 80305 DRUG TEST PRSMV DIR OPT OBS: CPT | Mod: PBBFAC | Performed by: PHYSICIAN ASSISTANT

## 2022-01-11 PROCEDURE — 99214 OFFICE O/P EST MOD 30 MIN: CPT | Mod: S$PBB,,, | Performed by: PHYSICIAN ASSISTANT

## 2022-01-11 PROCEDURE — 99214 PR OFFICE/OUTPT VISIT, EST, LEVL IV, 30-39 MIN: ICD-10-PCS | Mod: S$PBB,,, | Performed by: PHYSICIAN ASSISTANT

## 2022-01-11 RX ORDER — TRAMADOL HYDROCHLORIDE 50 MG/1
50 TABLET ORAL EVERY 12 HOURS
Qty: 60 TABLET | Refills: 1 | Status: SHIPPED | OUTPATIENT
Start: 2022-01-11 | End: 2022-03-02 | Stop reason: SDUPTHER

## 2022-01-11 NOTE — LETTER
January 11, 2022      [facility]  [address] [place] [address] [place] [address]  Phone: [phone]       Patient: Miriam Winter   YOB: 1970  Date of Visit: 01/11/2022    To Whom It May Concern:    Denise Winter  was at [facility] on 01/11/2022. The patient may return to work/school on 1/12/2022.   If you have any questions or concerns, or if I can be of further assistance, please do not hesitate to contact me.    Sincerely,        Emily Celis RN

## 2022-01-11 NOTE — PATIENT INSTRUCTIONS
Patient Education       Spondylolysis   About this topic   The spine is made up of bones called vertebrae. These bones are lined up on top of each other. The spinal bones have a large solid part called the body. There is also a michael arch that goes around your spinal cord. Spondylolysis is a tiny crack, or stress fracture, in this michael arch. This problem almost always happens in the lower back or the lumbar spine. This arch can crack on one or both sides. If it happens on both sides and it is not treated, it can lead to a more serious problem.     What are the causes?   · Too much stress on the bones in the spine. This often comes from playing sports like gymnastics, football, or weight-lifting.  · Having thin bones. This may be from birth.  · Trauma  · Degenerative problem  · Being overweight  What can make this more likely to happen?   · More common in children and teenagers  · Growth spurts  · Using poor techniques or improper equipment for sports or exercise  What are the main signs?   · Low back pain that is worse with activity and better with rest  · Pain in the buttocks, back of the thighs that may shoot down the leg  · Muscle spasms or tightness in the back or back of the thigh  · Some people have no signs  How does the doctor diagnose this health problem?   The doctor will do an exam and feel around your back. The doctor may have you move and push and pull on your legs to test your motion and strength. Your doctor may have you raise one leg straight up to check if the muscles in the back of your thigh are tight. Your doctor may check the feeling and reflexes in your legs to check for nerve problems. The doctor may order:  · X-ray  · CT or MRI scan  How does the doctor treat this health problem?   · Rest  · Back brace  · Exercises for strengthening and stretching  · Physical therapy (PT) for treatments to lessen pain and for instruction in exercises to help the problem  · Weight loss program if you are  overweight  · Surgery may be needed if pain does not get better or if other problems happen  What drugs may be needed?   The doctor may order drugs to:  · Help with pain and swelling  The doctor may give you a shot to help with pain and swelling. Talk with your doctor about the risks of this shot.   What problems could happen?   · A spinal bone could slip forward onto the spinal bone below. This is spondylolisthesis.  · Chronic back pain  · Nerve damage  · Need for surgery  What can be done to prevent this health problem?   · Stay active and work out to keep your muscles strong and flexible. Keep your back and belly muscles strong and your hamstring muscles flexible.  · Warm up slowly and stretch before you exercise. Use good training techniques and form for sports. Have an expert look at your technique.  · Wear the right equipment when playing sports.  · Use good ways to train, such as slowly adding to how many exercises you do.  · Take breaks often when doing things that use repeat movements.  · Do not exercise or play sports when you are tired or in pain.  · Use extra care in sports with a lot of back bending such as gymnastics, dancing, football, and wrestling.  · Eat a healthy diet to keep your muscles and bones healthy. Eat a diet rich in calcium and vitamin D to keep your bones strong.  · Keep a healthy weight so there is not extra stress on your joints.  Helpful tips   · If you have back pain, do not ignore it. Go to the doctor. The earlier this problem is treated, the better the results.  · Try swimming and biking to stay in shape. These activities put less stress on your back.  Where can I learn more?   Kids[employer]  https://kidshealth.org/en/parents/spondylolysis.html#kha_12   Last Reviewed Date   2020-03-16  Consumer Information Use and Disclaimer   This information is not specific medical advice and does not replace information you receive from your health care provider. This is only a brief summary of  general information. It does NOT include all information about conditions, illnesses, injuries, tests, procedures, treatments, therapies, discharge instructions or life-style choices that may apply to you. You must talk with your health care provider for complete information about your health and treatment options. This information should not be used to decide whether or not to accept your health care providers advice, instructions or recommendations. Only your health care provider has the knowledge and training to provide advice that is right for you.  Copyright   Copyright © 2021 [employer] and its affiliates and/or licensors. All rights reserved.

## 2022-03-02 NOTE — PROGRESS NOTES
Disclaimer:  This note has been generated using voice recognition software.  There may be type of graft focal areas that have been missed during a proof reading      Subjective:      Patient ID: Miriam Winter is a 51 y.o. female.    Chief Complaint: Low-back Pain and Leg Pain (right)      Pain  This is a chronic problem. The current episode started more than 1 year ago. The problem occurs daily. The problem has been unchanged. Associated symptoms include arthralgias and neck pain. Pertinent negatives include no change in bowel habit, chest pain, chills, coughing, diaphoresis, fever, rash, sore throat, vertigo or vomiting.     Review of Systems   Constitutional: Negative for appetite change, chills, diaphoresis, fever and unexpected weight change.   HENT: Negative for drooling, ear discharge, ear pain, facial swelling, nosebleeds, sore throat, trouble swallowing, voice change and goiter.    Eyes: Negative for photophobia, pain, discharge, redness and visual disturbance.   Respiratory: Negative for apnea, cough, choking, chest tightness, shortness of breath, wheezing and stridor.    Cardiovascular: Negative for chest pain, palpitations and leg swelling.   Gastrointestinal: Negative for abdominal distention, change in bowel habit, diarrhea, rectal pain, vomiting, fecal incontinence and change in bowel habit.   Endocrine: Negative for cold intolerance, heat intolerance, polydipsia, polyphagia and polyuria.   Genitourinary: Negative for bladder incontinence, dysuria, flank pain, frequency and hot flashes.   Musculoskeletal: Positive for arthralgias, back pain and neck pain.   Integumentary:  Negative for color change, pallor and rash.   Allergic/Immunologic: Negative for immunocompromised state.   Neurological: Negative for dizziness, vertigo, seizures, syncope, facial asymmetry, speech difficulty, light-headedness, disturbances in coordination, memory loss and coordination difficulties.   Hematological: Negative for  "adenopathy. Does not bruise/bleed easily.   Psychiatric/Behavioral: Negative for agitation, behavioral problems, confusion, decreased concentration, dysphoric mood, hallucinations, self-injury and suicidal ideas. The patient is not nervous/anxious and is not hyperactive.             Objective:  Vitals:    03/07/22 0921 03/07/22 0922   BP: (!) 151/100    Pulse: 103    Resp: 18    Weight: 87.5 kg (193 lb)    Height: 5' 3" (1.6 m)    PainSc:   6   6         Physical Exam  Vitals and nursing note reviewed. Exam conducted with a chaperone present.   Constitutional:       General: She is awake. She is not in acute distress.     Appearance: Normal appearance. She is not diaphoretic.   HENT:      Head: Normocephalic and atraumatic.      Nose: Nose normal.      Mouth/Throat:      Mouth: Mucous membranes are moist.      Pharynx: Oropharynx is clear.   Eyes:      Conjunctiva/sclera: Conjunctivae normal.      Pupils: Pupils are equal, round, and reactive to light.   Cardiovascular:      Rate and Rhythm: Normal rate.   Pulmonary:      Effort: Pulmonary effort is normal. No respiratory distress.   Abdominal:      Palpations: Abdomen is soft.      Tenderness: There is no guarding.   Musculoskeletal:         General: Normal range of motion.      Cervical back: Normal range of motion and neck supple. Tenderness present. No rigidity.      Thoracic back: Tenderness present.      Lumbar back: Tenderness present.   Skin:     General: Skin is warm and dry.      Coloration: Skin is not jaundiced or pale.   Neurological:      General: No focal deficit present.      Mental Status: She is alert and oriented to person, place, and time. Mental status is at baseline.      Cranial Nerves: Cranial nerves are intact. No cranial nerve deficit (II-XII).   Psychiatric:         Mood and Affect: Mood normal.         Behavior: Behavior normal. Behavior is cooperative.         Thought Content: Thought content normal.           [place] Fluoro for Pain " Management  See OP Notes for results.     IMPRESSION: See OP Notes for results.     This procedure was auto-finalized by: Virtual Radiologist       Office Visit on 01/11/2022   Component Date Value Ref Range Status    POC Amphetamines 01/11/2022 Negative  Negative, Inconclusive Final    POC Barbiturates 01/11/2022 Negative  Negative, Inconclusive Final    POC Benzodiazepines 01/11/2022 Negative  Negative, Inconclusive Final    POC Cocaine 01/11/2022 Negative  Negative, Inconclusive Final    POC THC 01/11/2022 Negative  Negative, Inconclusive Final    POC Methadone 01/11/2022 Negative  Negative, Inconclusive Final    POC Methamphetamine 01/11/2022 Negative  Negative, Inconclusive Final    POC Opiates 01/11/2022 Negative  Negative, Inconclusive Final    POC Oxycodone 01/11/2022 Negative  Negative, Inconclusive Final    POC Phencyclidine 01/11/2022 Negative  Negative, Inconclusive Final    POC Methylenedioxymethamphetamine * 01/11/2022 Negative  Negative, Inconclusive Final    POC Tricyclic Antidepressants 01/11/2022 Negative  Negative, Inconclusive Final    POC Buprenorphine 01/11/2022 Negative   Final     Acceptable 01/11/2022 Yes   Final    POC Temperature (Urine) 01/11/2022 94   Final           Assessment:      1. Lumbosacral spondylosis without myelopathy    2. Osteoarthrosis multiple sites, not specified as generalized          No orders of the defined types were placed in this encounter.        A's of Opioid Risk Assessment  Activity:Patient can perform ADL.   Analgesia:Patients pain is partially controlled by current medication. Patient has tried OTC medications such as Tylenol and Ibuprofen with out relief.   Adverse Effects: Patient denies constipation or sedation.  Aberrant Behavior:  reviewed with no aberrant drug seeking/taking behavior.  Overdose reversal drug naloxone discussed    Drug screen reviewed      Requested Prescriptions     Signed Prescriptions Disp Refills     traMADoL (ULTRAM) 50 mg tablet 60 tablet 1     Sig: Take 1 tablet (50 mg total) by mouth every 12 (twelve) hours.         Plan:    Presumptive drug screen negative, this is expected result, patient takes tramadol, cup does not test for tramadol    No new complaints    She states current medications helping control her discomfort     show she did obtain cough syrup from primary care provider she verbalized understanding to call our office prior to filling any narcotics    Continue home exercise program as directed    Continue current medication    Follow-up 2 months    Dr. Zambrano, December 2022    Bring original prescription medication bottles/container/box with labels to each visit

## 2022-03-07 ENCOUNTER — OFFICE VISIT (OUTPATIENT)
Dept: PAIN MEDICINE | Facility: CLINIC | Age: 52
End: 2022-03-07
Payer: COMMERCIAL

## 2022-03-07 VITALS
BODY MASS INDEX: 34.2 KG/M2 | DIASTOLIC BLOOD PRESSURE: 100 MMHG | HEART RATE: 103 BPM | RESPIRATION RATE: 18 BRPM | SYSTOLIC BLOOD PRESSURE: 151 MMHG | WEIGHT: 193 LBS | HEIGHT: 63 IN

## 2022-03-07 DIAGNOSIS — M47.817 LUMBOSACRAL SPONDYLOSIS WITHOUT MYELOPATHY: Primary | Chronic | ICD-10-CM

## 2022-03-07 DIAGNOSIS — M89.49 OSTEOARTHROSIS MULTIPLE SITES, NOT SPECIFIED AS GENERALIZED: Chronic | ICD-10-CM

## 2022-03-07 PROCEDURE — 99214 OFFICE O/P EST MOD 30 MIN: CPT | Mod: S$PBB,,, | Performed by: PHYSICIAN ASSISTANT

## 2022-03-07 PROCEDURE — 99214 PR OFFICE/OUTPT VISIT, EST, LEVL IV, 30-39 MIN: ICD-10-PCS | Mod: S$PBB,,, | Performed by: PHYSICIAN ASSISTANT

## 2022-03-07 PROCEDURE — 99214 OFFICE O/P EST MOD 30 MIN: CPT | Mod: PBBFAC | Performed by: PHYSICIAN ASSISTANT

## 2022-03-07 RX ORDER — TRAMADOL HYDROCHLORIDE 50 MG/1
50 TABLET ORAL EVERY 12 HOURS
Qty: 60 TABLET | Refills: 1 | Status: SHIPPED | OUTPATIENT
Start: 2022-03-11 | End: 2022-05-09 | Stop reason: SDUPTHER

## 2022-05-09 ENCOUNTER — OFFICE VISIT (OUTPATIENT)
Dept: PAIN MEDICINE | Facility: CLINIC | Age: 52
End: 2022-05-09
Payer: COMMERCIAL

## 2022-05-09 VITALS
DIASTOLIC BLOOD PRESSURE: 98 MMHG | RESPIRATION RATE: 18 BRPM | WEIGHT: 193 LBS | HEIGHT: 63 IN | BODY MASS INDEX: 34.2 KG/M2 | SYSTOLIC BLOOD PRESSURE: 146 MMHG | HEART RATE: 103 BPM

## 2022-05-09 DIAGNOSIS — M89.49 OSTEOARTHROSIS MULTIPLE SITES, NOT SPECIFIED AS GENERALIZED: Chronic | ICD-10-CM

## 2022-05-09 DIAGNOSIS — Z79.899 ENCOUNTER FOR LONG-TERM (CURRENT) USE OF OTHER MEDICATIONS: ICD-10-CM

## 2022-05-09 DIAGNOSIS — M47.817 LUMBOSACRAL SPONDYLOSIS WITHOUT MYELOPATHY: Primary | Chronic | ICD-10-CM

## 2022-05-09 LAB

## 2022-05-09 PROCEDURE — 99214 PR OFFICE/OUTPT VISIT, EST, LEVL IV, 30-39 MIN: ICD-10-PCS | Mod: S$PBB,,, | Performed by: PHYSICIAN ASSISTANT

## 2022-05-09 PROCEDURE — 99214 OFFICE O/P EST MOD 30 MIN: CPT | Mod: PBBFAC | Performed by: PHYSICIAN ASSISTANT

## 2022-05-09 PROCEDURE — 99214 OFFICE O/P EST MOD 30 MIN: CPT | Mod: S$PBB,,, | Performed by: PHYSICIAN ASSISTANT

## 2022-05-09 PROCEDURE — 80305 DRUG TEST PRSMV DIR OPT OBS: CPT | Mod: PBBFAC | Performed by: PHYSICIAN ASSISTANT

## 2022-05-09 RX ORDER — TRAMADOL HYDROCHLORIDE 50 MG/1
50 TABLET ORAL EVERY 12 HOURS
Qty: 60 TABLET | Refills: 0 | Status: SHIPPED | OUTPATIENT
Start: 2022-05-09 | End: 2022-06-03 | Stop reason: SDUPTHER

## 2022-05-09 NOTE — PROGRESS NOTES
Disclaimer:  This note has been generated using voice recognition software.  There may be type of graft focal areas that have been missed during a proof reading      Subjective:      Patient ID: Miriam Winter is a 51 y.o. female.    Chief Complaint: Low-back Pain and Leg Pain (B)      Pain  This is a chronic problem. The current episode started more than 1 year ago. The problem occurs daily. The problem has been waxing and waning. Associated symptoms include arthralgias and neck pain. Pertinent negatives include no change in bowel habit, chest pain, coughing, diaphoresis, fever, rash, sore throat, vertigo or vomiting.     Review of Systems   Constitutional: Negative for activity change, appetite change, diaphoresis, fever and unexpected weight change.   HENT: Negative for drooling, ear discharge, ear pain, facial swelling, nosebleeds, sore throat, trouble swallowing, voice change and goiter.    Eyes: Negative for photophobia, pain, discharge, redness and visual disturbance.   Respiratory: Negative for apnea, cough, choking, chest tightness, shortness of breath, wheezing and stridor.    Cardiovascular: Negative for chest pain, palpitations and leg swelling.   Gastrointestinal: Negative for abdominal distention, change in bowel habit, diarrhea, rectal pain, vomiting, fecal incontinence and change in bowel habit.   Endocrine: Negative for cold intolerance, heat intolerance, polydipsia, polyphagia and polyuria.   Genitourinary: Negative for bladder incontinence, dysuria, flank pain, frequency and hot flashes.   Musculoskeletal: Positive for arthralgias, back pain and neck pain.   Integumentary:  Negative for color change, pallor and rash.   Allergic/Immunologic: Negative for immunocompromised state.   Neurological: Negative for dizziness, vertigo, seizures, syncope, facial asymmetry, speech difficulty, light-headedness, disturbances in coordination, memory loss and coordination difficulties.   Hematological: Negative for  "adenopathy. Does not bruise/bleed easily.   Psychiatric/Behavioral: Negative for agitation, behavioral problems, confusion, decreased concentration, dysphoric mood, hallucinations, self-injury and suicidal ideas. The patient is not nervous/anxious and is not hyperactive.             Objective:  Vitals:    05/09/22 1259   BP: (!) 146/98   Pulse: 103   Resp: 18   Weight: 87.5 kg (193 lb)   Height: 5' 3" (1.6 m)   PainSc:   6         Physical Exam  Vitals and nursing note reviewed. Exam conducted with a chaperone present.   Constitutional:       General: She is awake. She is not in acute distress.     Appearance: Normal appearance. She is not toxic-appearing.   HENT:      Head: Normocephalic and atraumatic.      Nose: Nose normal.      Mouth/Throat:      Mouth: Mucous membranes are moist.      Pharynx: Oropharynx is clear.   Eyes:      Conjunctiva/sclera: Conjunctivae normal.      Pupils: Pupils are equal, round, and reactive to light.   Cardiovascular:      Rate and Rhythm: Normal rate.   Pulmonary:      Effort: Pulmonary effort is normal. No respiratory distress.   Abdominal:      Palpations: Abdomen is soft.      Tenderness: There is no guarding.   Musculoskeletal:         General: Normal range of motion.      Cervical back: Normal range of motion and neck supple. Tenderness present. No rigidity.      Thoracic back: Tenderness present.      Lumbar back: Tenderness present.   Skin:     General: Skin is warm and dry.      Coloration: Skin is not jaundiced or pale.   Neurological:      General: No focal deficit present.      Mental Status: She is alert and oriented to person, place, and time. Mental status is at baseline.      Cranial Nerves: Cranial nerves are intact. No cranial nerve deficit (II-XII).   Psychiatric:         Mood and Affect: Mood normal.         Behavior: Behavior normal. Behavior is cooperative.         Thought Content: Thought content normal.           [facility]  See OP Notes for " results.     IMPRESSION: See OP Notes for results.     This procedure was auto-finalized by: Virtual Radiologist       Office Visit on 01/11/2022   Component Date Value Ref Range Status    POC Amphetamines 01/11/2022 Negative  Negative, Inconclusive Final    POC Barbiturates 01/11/2022 Negative  Negative, Inconclusive Final    POC Benzodiazepines 01/11/2022 Negative  Negative, Inconclusive Final    POC Cocaine 01/11/2022 Negative  Negative, Inconclusive Final    POC THC 01/11/2022 Negative  Negative, Inconclusive Final    POC Methadone 01/11/2022 Negative  Negative, Inconclusive Final    POC Methamphetamine 01/11/2022 Negative  Negative, Inconclusive Final    POC Opiates 01/11/2022 Negative  Negative, Inconclusive Final    POC Oxycodone 01/11/2022 Negative  Negative, Inconclusive Final    POC Phencyclidine 01/11/2022 Negative  Negative, Inconclusive Final    POC Methylenedioxymethamphetamine * 01/11/2022 Negative  Negative, Inconclusive Final    POC Tricyclic Antidepressants 01/11/2022 Negative  Negative, Inconclusive Final    POC Buprenorphine 01/11/2022 Negative   Final     Acceptable 01/11/2022 Yes   Final    POC Temperature (Urine) 01/11/2022 94   Final           Assessment:      1. Lumbosacral spondylosis without myelopathy    2. Osteoarthrosis multiple sites, not specified as generalized    3. Encounter for long-term (current) use of other medications          Orders Placed This Encounter   Procedures    POCT Urine Drug Screen Presump     Interpretive Information:     Negative:  No drug detected at the cut off level.   Positive:  This result represents presumptive positive for the   tested drug, other substances may yield a positive response other   than the analyte of interest. This result should be utilized for   diagnostic purpose only. Confirmation testing will be performed upon physician request only.            A's of Opioid Risk Assessment  Activity:Patient can perform  ADL.   Analgesia:Patients pain is partially controlled by current medication. Patient has tried OTC medications such as Tylenol and Ibuprofen with out relief.   Adverse Effects: Patient denies constipation or sedation.  Aberrant Behavior:  reviewed with no aberrant drug seeking/taking behavior.  Overdose reversal drug naloxone discussed    Drug screen reviewed      Requested Prescriptions     Signed Prescriptions Disp Refills    traMADoL (ULTRAM) 50 mg tablet 60 tablet 0     Sig: Take 1 tablet (50 mg total) by mouth every 12 (twelve) hours.         Plan:    Presumptive drug screen negative, this is expected result, patient takes tramadol, cup does not test for tramadol    Complaint increasing pain numbness and tingling bilateral lower extremities worse with standing walking she states primary care provider has drawn blood work had ultrasound lower extremities    Will request the records from her primary care provider for review patient requesting that we look at these records    Continue home exercise program as directed    Continue current medication    Follow-up 1 month    Dr. Zambrano, December 2022    Bring original prescription medication bottles/container/box with labels to each visit

## 2022-06-03 NOTE — PROGRESS NOTES
Subjective:      Patient ID: Miriam Winter is a 51 y.o. female.    Chief Complaint: Low-back Pain and Leg Pain      Pain  This is a chronic problem. The current episode started more than 1 year ago. The problem occurs daily. The problem has been unchanged. Associated symptoms include arthralgias and neck pain. Pertinent negatives include no change in bowel habit, chest pain, coughing, diaphoresis, fever, rash, sore throat, vertigo or vomiting.     Review of Systems   Constitutional: Negative for activity change, appetite change, diaphoresis, fever and unexpected weight change.   HENT: Negative for drooling, ear discharge, ear pain, facial swelling, nosebleeds, sore throat, trouble swallowing, voice change and goiter.    Eyes: Negative for photophobia, pain, discharge, redness and visual disturbance.   Respiratory: Negative for apnea, cough, choking, chest tightness, shortness of breath, wheezing and stridor.    Cardiovascular: Negative for chest pain, palpitations and leg swelling.   Gastrointestinal: Negative for abdominal distention, change in bowel habit, diarrhea, rectal pain, vomiting, fecal incontinence and change in bowel habit.   Endocrine: Negative for cold intolerance, heat intolerance, polydipsia, polyphagia and polyuria.   Genitourinary: Negative for bladder incontinence, dysuria, flank pain, frequency and hot flashes.   Musculoskeletal: Positive for arthralgias, back pain and neck pain.   Integumentary:  Negative for color change, pallor and rash.   Allergic/Immunologic: Negative for immunocompromised state.   Neurological: Negative for dizziness, vertigo, seizures, syncope, facial asymmetry, speech difficulty, light-headedness, disturbances in coordination, memory loss and coordination difficulties.   Hematological: Negative for adenopathy. Does not bruise/bleed easily.   Psychiatric/Behavioral: Negative for agitation, behavioral problems, confusion, decreased concentration, dysphoric mood,  "hallucinations, self-injury and suicidal ideas. The patient is not nervous/anxious and is not hyperactive.             Objective:  Vitals:    06/06/22 0842 06/06/22 0843   BP: (!) 152/84    Pulse: 101    Resp: 20    Weight: 87.5 kg (193 lb)    Height: 5' 3" (1.6 m)    PainSc:   6   6         Physical Exam  Vitals and nursing note reviewed. Exam conducted with a chaperone present.   Constitutional:       General: She is awake. She is not in acute distress.     Appearance: Normal appearance. She is not toxic-appearing.   HENT:      Head: Normocephalic and atraumatic.      Nose: Nose normal.      Mouth/Throat:      Mouth: Mucous membranes are moist.      Pharynx: Oropharynx is clear.   Eyes:      Conjunctiva/sclera: Conjunctivae normal.      Pupils: Pupils are equal, round, and reactive to light.   Cardiovascular:      Rate and Rhythm: Normal rate.   Pulmonary:      Effort: Pulmonary effort is normal. No respiratory distress.   Abdominal:      Palpations: Abdomen is soft.      Tenderness: There is no guarding.   Musculoskeletal:         General: Normal range of motion.      Cervical back: Normal range of motion and neck supple. Tenderness present. No rigidity.      Thoracic back: Tenderness present.      Lumbar back: Tenderness present.   Skin:     General: Skin is warm and dry.      Coloration: Skin is not jaundiced or pale.   Neurological:      General: No focal deficit present.      Mental Status: She is alert and oriented to person, place, and time. Mental status is at baseline.      Cranial Nerves: Cranial nerves are intact. No cranial nerve deficit (II-XII).   Psychiatric:         Mood and Affect: Mood normal.         Behavior: Behavior normal. Behavior is cooperative.         Thought Content: Thought content normal.           [facility]  See OP Notes for results.     IMPRESSION: See OP Notes for results.     This procedure was auto-finalized by: Virtual Radiologist       Office Visit on " 05/09/2022   Component Date Value Ref Range Status    POC Amphetamines 05/09/2022 Negative  Negative, Inconclusive Final    POC Barbiturates 05/09/2022 Negative  Negative, Inconclusive Final    POC Benzodiazepines 05/09/2022 Negative  Negative, Inconclusive Final    POC Cocaine 05/09/2022 Negative  Negative, Inconclusive Final    POC THC 05/09/2022 Negative  Negative, Inconclusive Final    POC Methadone 05/09/2022 Negative  Negative, Inconclusive Final    POC Methamphetamine 05/09/2022 Negative  Negative, Inconclusive Final    POC Opiates 05/09/2022 Negative  Negative, Inconclusive Final    POC Oxycodone 05/09/2022 Negative  Negative, Inconclusive Final    POC Phencyclidine 05/09/2022 Negative  Negative, Inconclusive Final    POC Methylenedioxymethamphetamine * 05/09/2022 Negative  Negative, Inconclusive Final    POC Tricyclic Antidepressants 05/09/2022 Negative  Negative, Inconclusive Final    POC Buprenorphine 05/09/2022 Negative   Final     Acceptable 05/09/2022 Yes   Final    POC Temperature (Urine) 05/09/2022 94   Final   Office Visit on 01/11/2022   Component Date Value Ref Range Status    POC Amphetamines 01/11/2022 Negative  Negative, Inconclusive Final    POC Barbiturates 01/11/2022 Negative  Negative, Inconclusive Final    POC Benzodiazepines 01/11/2022 Negative  Negative, Inconclusive Final    POC Cocaine 01/11/2022 Negative  Negative, Inconclusive Final    POC THC 01/11/2022 Negative  Negative, Inconclusive Final    POC Methadone 01/11/2022 Negative  Negative, Inconclusive Final    POC Methamphetamine 01/11/2022 Negative  Negative, Inconclusive Final    POC Opiates 01/11/2022 Negative  Negative, Inconclusive Final    POC Oxycodone 01/11/2022 Negative  Negative, Inconclusive Final    POC Phencyclidine 01/11/2022 Negative  Negative, Inconclusive Final    POC Methylenedioxymethamphetamine * 01/11/2022 Negative  Negative, Inconclusive Final    POC Tricyclic  Antidepressants 01/11/2022 Negative  Negative, Inconclusive Final    POC Buprenorphine 01/11/2022 Negative   Final     Acceptable 01/11/2022 Yes   Final    POC Temperature (Urine) 01/11/2022 94   Final           Assessment:      1. Lumbosacral spondylosis without myelopathy    2. Osteoarthrosis multiple sites, not specified as generalized    3. Cervical radiculopathy          No orders of the defined types were placed in this encounter.        A's of Opioid Risk Assessment  Activity:Patient can perform ADL.   Analgesia:Patients pain is partially controlled by current medication. Patient has tried OTC medications such as Tylenol and Ibuprofen with out relief.   Adverse Effects: Patient denies constipation or sedation.  Aberrant Behavior:  reviewed with no aberrant drug seeking/taking behavior.  Overdose reversal drug naloxone discussed    Drug screen reviewed      Requested Prescriptions     Signed Prescriptions Disp Refills    traMADoL (ULTRAM) 50 mg tablet 90 tablet 0     Sig: Take 1 tablet (50 mg total) by mouth every 8 (eight) hours as needed for Pain.         Plan:    Presumptive drug screen negative, this is expected result, patient takes tramadol, cup does not test for tramadol    Waiting from records from primary care provider for provider has drawn blood work had ultrasound lower extremities    We did not get the records as requested    Patient states she will bring the records to clinic next visit    She is also complaint of pain numbness and tingling right upper extremity worse with increased use worse at night    Requesting assistance with pain medication she states current medications helping but not controlling her discomfort    She has been consistent with office visits, drug screens    She verbalized understanding will need to consider evaluations for cervical radiculopathy carpal tunnel syndrome    Increase tramadol 50 mg 1 p.o. q.8 hours    Continue home exercise program as  directed    Follow-up 2 months    Dr. Zambrano, December 2022    Bring original prescription medication bottles/container/box with labels to each visit

## 2022-06-06 ENCOUNTER — OFFICE VISIT (OUTPATIENT)
Dept: PAIN MEDICINE | Facility: CLINIC | Age: 52
End: 2022-06-06
Payer: COMMERCIAL

## 2022-06-06 VITALS
WEIGHT: 193 LBS | RESPIRATION RATE: 20 BRPM | HEIGHT: 63 IN | DIASTOLIC BLOOD PRESSURE: 84 MMHG | BODY MASS INDEX: 34.2 KG/M2 | HEART RATE: 101 BPM | SYSTOLIC BLOOD PRESSURE: 152 MMHG

## 2022-06-06 DIAGNOSIS — M89.49 OSTEOARTHROSIS MULTIPLE SITES, NOT SPECIFIED AS GENERALIZED: Chronic | ICD-10-CM

## 2022-06-06 DIAGNOSIS — M47.817 LUMBOSACRAL SPONDYLOSIS WITHOUT MYELOPATHY: Primary | Chronic | ICD-10-CM

## 2022-06-06 DIAGNOSIS — M54.12 CERVICAL RADICULOPATHY: Chronic | ICD-10-CM

## 2022-06-06 PROCEDURE — 99214 OFFICE O/P EST MOD 30 MIN: CPT | Mod: S$PBB,,, | Performed by: PHYSICIAN ASSISTANT

## 2022-06-06 PROCEDURE — 99214 OFFICE O/P EST MOD 30 MIN: CPT | Mod: PBBFAC | Performed by: PHYSICIAN ASSISTANT

## 2022-06-06 PROCEDURE — 99214 PR OFFICE/OUTPT VISIT, EST, LEVL IV, 30-39 MIN: ICD-10-PCS | Mod: S$PBB,,, | Performed by: PHYSICIAN ASSISTANT

## 2022-06-06 RX ORDER — TRAMADOL HYDROCHLORIDE 50 MG/1
50 TABLET ORAL EVERY 8 HOURS PRN
Qty: 90 TABLET | Refills: 0 | Status: SHIPPED | OUTPATIENT
Start: 2022-06-08 | End: 2022-07-05 | Stop reason: SDUPTHER

## 2022-07-05 DIAGNOSIS — M54.12 CERVICAL RADICULOPATHY: Chronic | ICD-10-CM

## 2022-07-05 DIAGNOSIS — M89.49 OSTEOARTHROSIS MULTIPLE SITES, NOT SPECIFIED AS GENERALIZED: Chronic | ICD-10-CM

## 2022-07-05 DIAGNOSIS — M47.817 LUMBOSACRAL SPONDYLOSIS WITHOUT MYELOPATHY: Chronic | ICD-10-CM

## 2022-07-05 RX ORDER — TRAMADOL HYDROCHLORIDE 50 MG/1
50 TABLET ORAL EVERY 8 HOURS PRN
Qty: 90 TABLET | Refills: 0 | Status: SHIPPED | OUTPATIENT
Start: 2022-07-06 | End: 2022-08-02 | Stop reason: SDUPTHER

## 2022-07-05 NOTE — TELEPHONE ENCOUNTER
Pt was seen by  CORY Krishnamurthy on 6-6-2022 in which tramadol was prescribed and to followup in 2 months.  Pt was not given a refill on the tramadol.  Per , Tramadol 50mg #90 was filled on 6-6-2022.

## 2022-07-05 NOTE — TELEPHONE ENCOUNTER
----- Message from Tangela De Leon sent at 7/5/2022  9:44 AM CDT -----  Regarding: med refill  Patient didn't receive refill for tramadol.  Please call [phone].

## 2022-08-02 NOTE — PROGRESS NOTES
Subjective:      Patient ID: Miriam Winter is a 51 y.o. female.    Chief Complaint: Back Pain      Pain  This is a chronic problem. The current episode started more than 1 year ago. The problem occurs daily. The problem has been waxing and waning. Associated symptoms include arthralgias and neck pain. Pertinent negatives include no change in bowel habit, chest pain, coughing, diaphoresis, fever, rash, sore throat, vertigo or vomiting.     Review of Systems   Constitutional: Negative for activity change, appetite change, diaphoresis, fever and unexpected weight change.   HENT: Negative for drooling, ear discharge, ear pain, facial swelling, nosebleeds, sore throat, trouble swallowing, voice change and goiter.    Eyes: Negative for photophobia, pain, discharge, redness and visual disturbance.   Respiratory: Negative for apnea, cough, choking, chest tightness, shortness of breath, wheezing and stridor.    Cardiovascular: Negative for chest pain, palpitations and leg swelling.   Gastrointestinal: Negative for abdominal distention, change in bowel habit, diarrhea, rectal pain, vomiting, fecal incontinence and change in bowel habit.   Endocrine: Negative for cold intolerance, heat intolerance, polydipsia, polyphagia and polyuria.   Genitourinary: Negative for bladder incontinence, dysuria, flank pain, frequency and hot flashes.   Musculoskeletal: Positive for arthralgias, back pain and neck pain.   Integumentary:  Negative for color change, pallor and rash.   Allergic/Immunologic: Negative for immunocompromised state.   Neurological: Negative for dizziness, vertigo, seizures, syncope, facial asymmetry, speech difficulty, light-headedness, disturbances in coordination, memory loss and coordination difficulties.   Hematological: Negative for adenopathy. Does not bruise/bleed easily.   Psychiatric/Behavioral: Negative for agitation, behavioral problems, confusion, decreased concentration, dysphoric mood, hallucinations,  "self-injury and suicidal ideas. The patient is not nervous/anxious and is not hyperactive.             Past Surgical History:   Procedure Laterality Date    CHOLECYSTECTOMY      HYSTERECTOMY      INJECTION OF ANESTHETIC AGENT AROUND MEDIAL BRANCH NERVES INNERVATING LUMBAR FACET JOINT Bilateral 9/23/2021    Procedure: Bilateral L4-5,5-S1 MBB;  Surgeon: Mabel Zambrano MD;  Location: [facility];  Service: Pain Management;  Laterality: Bilateral;  PT AWARE TO BE TESTED    INJECTION OF ANESTHETIC AGENT AROUND MEDIAL BRANCH NERVES INNERVATING LUMBAR FACET JOINT Bilateral 10/26/2021    Procedure: Block-nerve-medial branch-lumbar, bilateral L4 through S1;  Surgeon: Mabel Zambrano MD;  Location: [facility];  Service: Pain Management;  Laterality: Bilateral;  PT AWARE ON OV TO BE TESTED    PARTIAL HYSTERECTOMY      RADIOFREQUENCY ABLATION OF LUMBAR MEDIAL BRANCH NERVE AT SINGLE LEVEL Bilateral 12/9/2021    Procedure: Radiofrequency Ablation, Nerve, Spinal, Lumbar, Medial Branch, 1 Level L4-S1 Right side 1st pre-cert left side in 2 weeks;  Surgeon: Mabel Zambrano MD;  Location: [facility];  Service: Pain Management;  Laterality: Bilateral;  vaccination card scanned in    RADIOFREQUENCY ABLATION OF LUMBAR MEDIAL BRANCH NERVE AT SINGLE LEVEL Left 12/23/2021    Procedure: RADIOFREQUENCY ABLATION, NERVE, SPINAL, LUMBAR, MEDIAL BRANCH, 1 LEVEL   LEFT L4-S1 RFTC  (pt already had right side);  Surgeon: Mabel Zambrano MD;  Location: [facility];  Service: Pain Management;  Laterality: Left;  VAC  CARD SCANNED IN    TUBAL LIGATION         Objective:  Vitals:    08/03/22 0919   BP: (!) 151/94   Pulse: 84   Resp: 17   SpO2: 99%   Weight: 87.5 kg (193 lb)   Height: 5' 3" (1.6 m)   PainSc:   1         Physical Exam  Vitals and nursing note reviewed. Exam conducted with a chaperone present.   Constitutional:       General: She is awake. She is not in acute distress.     Appearance: Normal " appearance. She is not toxic-appearing.   HENT:      Head: Normocephalic and atraumatic.      Nose: Nose normal.      Mouth/Throat:      Mouth: Mucous membranes are moist.      Pharynx: Oropharynx is clear.   Eyes:      Conjunctiva/sclera: Conjunctivae normal.      Pupils: Pupils are equal, round, and reactive to light.   Cardiovascular:      Rate and Rhythm: Normal rate.   Pulmonary:      Effort: Pulmonary effort is normal. No respiratory distress.   Abdominal:      Palpations: Abdomen is soft.      Tenderness: There is no guarding.   Musculoskeletal:         General: Normal range of motion.      Cervical back: Normal range of motion and neck supple. Tenderness present. No rigidity.      Thoracic back: Tenderness present.      Lumbar back: Tenderness present.   Skin:     General: Skin is warm and dry.      Coloration: Skin is not jaundiced or pale.   Neurological:      General: No focal deficit present.      Mental Status: She is alert and oriented to person, place, and time. Mental status is at baseline.      Cranial Nerves: No cranial nerve deficit (II-XII).   Psychiatric:         Mood and Affect: Mood normal.         Behavior: Behavior normal. Behavior is cooperative.         Thought Content: Thought content normal.           [place] Fluoro for Pain Management  See OP Notes for results.     IMPRESSION: See OP Notes for results.     This procedure was auto-finalized by: Virtual Radiologist       Office Visit on 05/09/2022   Component Date Value Ref Range Status    POC Amphetamines 05/09/2022 Negative  Negative, Inconclusive Final-Edited    POC Barbiturates 05/09/2022 Negative  Negative, Inconclusive Final-Edited    POC Benzodiazepines 05/09/2022 Negative  Negative, Inconclusive Final-Edited    POC Cocaine 05/09/2022 Negative  Negative, Inconclusive Final-Edited    POC THC 05/09/2022 Negative  Negative, Inconclusive Final-Edited    POC Methadone 05/09/2022 Negative  Negative, Inconclusive Final-Edited    POC  Methamphetamine 05/09/2022 Negative  Negative, Inconclusive Final-Edited    POC Opiates 05/09/2022 Negative  Negative, Inconclusive Final-Edited    POC Oxycodone 05/09/2022 Negative  Negative, Inconclusive Final-Edited    POC Phencyclidine 05/09/2022 Negative  Negative, Inconclusive Final-Edited    POC Methylenedioxymethamphetamine * 05/09/2022 Negative  Negative, Inconclusive Final-Edited    POC Tricyclic Antidepressants 05/09/2022 Negative  Negative, Inconclusive Final-Edited    POC Buprenorphine 05/09/2022 Negative   Final-Edited     Acceptable 05/09/2022 Yes   Final-Edited    POC Temperature (Urine) 05/09/2022 94   Final-Edited           Assessment:      1. Lumbosacral radiculopathy    2. Osteoarthrosis multiple sites, not specified as generalized    3. Cervical radiculopathy    4. Encounter for long-term (current) use of other medications    5. Lumbar radiculopathy, chronic    6. Dorsalgia, unspecified          Orders Placed This Encounter   Procedures    MRI Lumbar Spine Without Contrast     Standing Status:   Future     Standing Expiration Date:   8/3/2023     Order Specific Question:   Does the patient have a pacemaker or a defibrilator (Note: Some facilities may not be able to schedule an MRI for patients with pacemakers and defibrillators. You should contact your local radiology department to determine if this is the case.)?     Answer:   No     Order Specific Question:   Does the patient have an aneurysm or surgical clip, pump, nerve/brain stimulator, middle/inner ear prosthesis, or other metal implant or foreign object (bullet, shrapnel)? If they have a card related to their implant, ask them to bring it. Issues related to the implant may cause the MRI to be delayed.     Answer:   No     Order Specific Question:   Is the patient claustrophobic?     Answer:   No     Order Specific Question:   Will the patient require sedation?     Answer:   No     Order Specific Question:   Does  the patient have any of the following conditions? Diabetes, History of Renal Disease or Hypertension requiring medical therapy?     Answer:   No     Order Specific Question:   May the Radiologist modify the order per protocol to meet the clinical needs of the patient?     Answer:   Yes     Order Specific Question:   Is this part of a Research Study?     Answer:   No     Order Specific Question:   Recist criteria?     Answer:   No     Order Specific Question:   Will this service be billed to a Worker's Comp policy?     Answer:   No     Order Specific Question:   Does the patient have on a skin patch for medication with aluminized backing?     Answer:   No     Order Specific Question:   Is the patient pregnant?     Answer:   No    X-Ray Lumbar Complete Including Flex And Ext     Standing Status:   Future     Standing Expiration Date:   8/3/2023     Order Specific Question:   Is the patient pregnant?     Answer:   No     Order Specific Question:   May the Radiologist modify the order per protocol to meet the clinical needs of the patient?     Answer:   Yes     Order Specific Question:   Does the patient have a neck collar or brace on?     Answer:   No    POCT Urine Drug Screen Presump     Interpretive Information:     Negative:  No drug detected at the cut off level.   Positive:  This result represents presumptive positive for the   tested drug, other substances may yield a positive response other   than the analyte of interest. This result should be utilized for   diagnostic purpose only. Confirmation testing will be performed upon physician request only.            A's of Opioid Risk Assessment  Activity:Patient can perform ADL.   Analgesia:Patients pain is partially controlled by current medication. Patient has tried OTC medications such as Tylenol and Ibuprofen with out relief.   Adverse Effects: Patient denies constipation or sedation.  Aberrant Behavior:  reviewed with no aberrant drug seeking/taking  behavior.  Overdose reversal drug naloxone discussed    Drug screen reviewed      Requested Prescriptions     Signed Prescriptions Disp Refills    traMADoL (ULTRAM) 50 mg tablet 90 tablet 0     Sig: Take 1 tablet (50 mg total) by mouth every 8 (eight) hours as needed for Pain.         Plan:    Presumptive drug screen negative, this is expected result, patient takes tramadol, cup does not test for tramadol    Complaint of back pain buttock and leg pain right greater than left leg pain numbness and tingling radicular in nature ongoing for more than 3 months    Waiting from records from primary care provider for provider has drawn blood work had ultrasound lower extremities     We did not get the records as requested     Patient states she will bring the records to clinic next visit    She is requesting further investigational procedures    She has failed conservative management    X-ray lumbar spine    MRI lumbar spine no contrast, lumbosacral radiculopathy  MRI for consideration procedure/surgery    I have given the patient medically directed home exercise program    Patient has tried NSAIDs and neuromodulators (neurontin, lyrica, elavil, or cymbalta)    Continue home exercise program as directed ongoing since October 13, 2021 2-3 days per week 1 hour per day    Follow-up after MRI lumbar spine    Dr. Zambrano, December 2022    Bring original prescription medication bottles/container/box with labels to each visit

## 2022-08-03 ENCOUNTER — HOSPITAL ENCOUNTER (OUTPATIENT)
Dept: RADIOLOGY | Facility: HOSPITAL | Age: 52
Discharge: HOME | End: 2022-08-03
Attending: PHYSICIAN ASSISTANT
Payer: COMMERCIAL

## 2022-08-03 ENCOUNTER — OFFICE VISIT (OUTPATIENT)
Dept: PAIN MEDICINE | Facility: CLINIC | Age: 52
End: 2022-08-03
Payer: COMMERCIAL

## 2022-08-03 VITALS
HEIGHT: 63 IN | DIASTOLIC BLOOD PRESSURE: 94 MMHG | RESPIRATION RATE: 17 BRPM | SYSTOLIC BLOOD PRESSURE: 151 MMHG | WEIGHT: 193 LBS | HEART RATE: 84 BPM | OXYGEN SATURATION: 99 % | BODY MASS INDEX: 34.2 KG/M2

## 2022-08-03 DIAGNOSIS — Z79.899 ENCOUNTER FOR LONG-TERM (CURRENT) USE OF OTHER MEDICATIONS: ICD-10-CM

## 2022-08-03 DIAGNOSIS — M89.49 OSTEOARTHROSIS MULTIPLE SITES, NOT SPECIFIED AS GENERALIZED: Chronic | ICD-10-CM

## 2022-08-03 DIAGNOSIS — M54.12 CERVICAL RADICULOPATHY: Chronic | ICD-10-CM

## 2022-08-03 DIAGNOSIS — M54.9 DORSALGIA, UNSPECIFIED: ICD-10-CM

## 2022-08-03 DIAGNOSIS — M54.16 LUMBAR RADICULOPATHY, CHRONIC: ICD-10-CM

## 2022-08-03 DIAGNOSIS — M54.17 LUMBOSACRAL RADICULOPATHY: Primary | Chronic | ICD-10-CM

## 2022-08-03 LAB

## 2022-08-03 PROCEDURE — 99215 OFFICE O/P EST HI 40 MIN: CPT | Mod: PBBFAC | Performed by: PHYSICIAN ASSISTANT

## 2022-08-03 PROCEDURE — 99214 OFFICE O/P EST MOD 30 MIN: CPT | Mod: S$PBB,,, | Performed by: PHYSICIAN ASSISTANT

## 2022-08-03 PROCEDURE — 72100 X-RAY EXAM L-S SPINE 2/3 VWS: CPT | Mod: TC

## 2022-08-03 PROCEDURE — 99214 PR OFFICE/OUTPT VISIT, EST, LEVL IV, 30-39 MIN: ICD-10-PCS | Mod: S$PBB,,, | Performed by: PHYSICIAN ASSISTANT

## 2022-08-03 PROCEDURE — 80305 DRUG TEST PRSMV DIR OPT OBS: CPT | Mod: PBBFAC | Performed by: PHYSICIAN ASSISTANT

## 2022-08-03 PROCEDURE — 72100 X-RAY EXAM L-S SPINE 2/3 VWS: CPT | Mod: 26,,, | Performed by: RADIOLOGY

## 2022-08-03 PROCEDURE — 72100 XR LUMBAR SPINE AP AND LATERAL: ICD-10-PCS | Mod: 26,,, | Performed by: RADIOLOGY

## 2022-08-03 RX ORDER — TRAMADOL HYDROCHLORIDE 50 MG/1
50 TABLET ORAL EVERY 8 HOURS PRN
Qty: 90 TABLET | Refills: 0 | Status: SHIPPED | OUTPATIENT
Start: 2022-08-06 | End: 2022-08-16 | Stop reason: SDUPTHER

## 2022-08-12 ENCOUNTER — HOSPITAL ENCOUNTER (OUTPATIENT)
Dept: RADIOLOGY | Facility: HOSPITAL | Age: 52
Discharge: HOME | End: 2022-08-12
Attending: PHYSICIAN ASSISTANT
Payer: COMMERCIAL

## 2022-08-12 DIAGNOSIS — M54.16 LUMBAR RADICULOPATHY, CHRONIC: ICD-10-CM

## 2022-08-12 PROCEDURE — 72148 MRI LUMBAR SPINE W/O DYE: CPT | Mod: TC

## 2022-08-12 PROCEDURE — 72148 MRI LUMBAR SPINE W/O DYE: CPT | Mod: 26,,, | Performed by: RADIOLOGY

## 2022-08-12 PROCEDURE — 72148 MRI LUMBAR SPINE WITHOUT CONTRAST: ICD-10-PCS | Mod: 26,,, | Performed by: RADIOLOGY

## 2022-08-16 NOTE — PROGRESS NOTES
Subjective:      Patient ID: Miriam Winter is a 51 y.o. female.    Chief Complaint: Low-back Pain      Pain  This is a chronic problem. The current episode started more than 1 year ago. The problem occurs daily. The problem has been waxing and waning. Associated symptoms include arthralgias and neck pain. Pertinent negatives include no change in bowel habit, chest pain, coughing, diaphoresis, fever, rash, sore throat, vertigo or vomiting.     Review of Systems   Constitutional: Negative for activity change, appetite change, diaphoresis, fever and unexpected weight change.   HENT: Negative for drooling, ear discharge, ear pain, facial swelling, nosebleeds, sore throat, trouble swallowing, voice change and goiter.    Eyes: Negative for photophobia, pain, discharge, redness and visual disturbance.   Respiratory: Negative for apnea, cough, choking, chest tightness, shortness of breath, wheezing and stridor.    Cardiovascular: Negative for chest pain, palpitations and leg swelling.   Gastrointestinal: Negative for abdominal distention, change in bowel habit, diarrhea, rectal pain, vomiting, fecal incontinence and change in bowel habit.   Endocrine: Negative for cold intolerance, heat intolerance, polydipsia, polyphagia and polyuria.   Genitourinary: Negative for bladder incontinence, dysuria, flank pain, frequency and hot flashes.   Musculoskeletal: Positive for arthralgias, back pain and neck pain.   Integumentary:  Negative for color change, pallor and rash.   Allergic/Immunologic: Negative for immunocompromised state.   Neurological: Negative for dizziness, vertigo, seizures, syncope, facial asymmetry, speech difficulty, light-headedness, disturbances in coordination, memory loss and coordination difficulties.   Hematological: Negative for adenopathy. Does not bruise/bleed easily.   Psychiatric/Behavioral: Negative for agitation, behavioral problems, confusion, decreased concentration, dysphoric mood, hallucinations,  "self-injury and suicidal ideas. The patient is not nervous/anxious and is not hyperactive.             Past Surgical History:   Procedure Laterality Date    CHOLECYSTECTOMY      HYSTERECTOMY      INJECTION OF ANESTHETIC AGENT AROUND MEDIAL BRANCH NERVES INNERVATING LUMBAR FACET JOINT Bilateral 9/23/2021    Procedure: Bilateral L4-5,5-S1 MBB;  Surgeon: Mabel Zambrano MD;  Location: [facility];  Service: Pain Management;  Laterality: Bilateral;  PT AWARE TO BE TESTED    INJECTION OF ANESTHETIC AGENT AROUND MEDIAL BRANCH NERVES INNERVATING LUMBAR FACET JOINT Bilateral 10/26/2021    Procedure: Block-nerve-medial branch-lumbar, bilateral L4 through S1;  Surgeon: Mabel Zambrano MD;  Location: [facility];  Service: Pain Management;  Laterality: Bilateral;  PT AWARE ON OV TO BE TESTED    PARTIAL HYSTERECTOMY      RADIOFREQUENCY ABLATION OF LUMBAR MEDIAL BRANCH NERVE AT SINGLE LEVEL Bilateral 12/9/2021    Procedure: Radiofrequency Ablation, Nerve, Spinal, Lumbar, Medial Branch, 1 Level L4-S1 Right side 1st pre-cert left side in 2 weeks;  Surgeon: Mabel Zambrano MD;  Location: [facility];  Service: Pain Management;  Laterality: Bilateral;  vaccination card scanned in    RADIOFREQUENCY ABLATION OF LUMBAR MEDIAL BRANCH NERVE AT SINGLE LEVEL Left 12/23/2021    Procedure: RADIOFREQUENCY ABLATION, NERVE, SPINAL, LUMBAR, MEDIAL BRANCH, 1 LEVEL   LEFT L4-S1 RFTC  (pt already had right side);  Surgeon: aMbel Zambrano MD;  Location: [facility];  Service: Pain Management;  Laterality: Left;  VAC  CARD SCANNED IN    TUBAL LIGATION         Objective:  Vitals:    08/17/22 1327   BP: 125/84   Pulse: 95   Resp: 18   Weight: 85.7 kg (189 lb)   Height: 5' 3" (1.6 m)   PainSc: 0-No pain         Physical Exam  Vitals and nursing note reviewed. Exam conducted with a chaperone present.   Constitutional:       General: She is awake. She is not in acute distress.     Appearance: Normal appearance. She " is not toxic-appearing.   HENT:      Head: Normocephalic and atraumatic.      Nose: Nose normal.      Mouth/Throat:      Mouth: Mucous membranes are moist.      Pharynx: Oropharynx is clear.   Eyes:      Conjunctiva/sclera: Conjunctivae normal.      Pupils: Pupils are equal, round, and reactive to light.   Cardiovascular:      Rate and Rhythm: Normal rate.   Pulmonary:      Effort: Pulmonary effort is normal. No respiratory distress.   Abdominal:      Palpations: Abdomen is soft.      Tenderness: There is no guarding.   Musculoskeletal:         General: Normal range of motion.      Cervical back: Normal range of motion and neck supple. Tenderness present. No rigidity.      Thoracic back: Tenderness present.      Lumbar back: Tenderness present.   Skin:     General: Skin is warm and dry.      Coloration: Skin is not jaundiced or pale.   Neurological:      General: No focal deficit present.      Mental Status: She is alert and oriented to person, place, and time. Mental status is at baseline.      Cranial Nerves: No cranial nerve deficit (II-XII).   Psychiatric:         Mood and Affect: Mood normal.         Behavior: Behavior normal. Behavior is cooperative.         Thought Content: Thought content normal.           MRI Lumbar Spine Without Contrast  Narrative: EXAMINATION:  MRI LUMBAR SPINE WITHOUT CONTRAST    CLINICAL HISTORY:  Radiculopathy, lumbar regionLumbar radiculopathy, symptoms persist with conservative treatment;    COMPARISON:  None    TECHNIQUE:  Multiplanar MRI imaging of the lumbar spine was performed without the use of intravenous contrast.    FINDINGS:  Lumbar vertebral body heights and alignment appear maintained.  There is patchy T1 hypointensity throughout the visualized marrow suspicious for anemia or other hematopoietic abnormality.  Tarlov cyst demonstrated at the S2 level to the right of midline measuring up to 1.5 cm.    Intervertebral disc space levels:    L1-L2: No significant disc bulge,  neuroforaminal narrowing or spinal canal stenosis.    L2-L3: No significant disc bulge, neuroforaminal narrowing or spinal canal stenosis.    L3-L4: No significant disc bulge, neuroforaminal narrowing or spinal canal stenosis.    L4-L5: No significant disc bulge, neuroforaminal narrowing or spinal canal stenosis.    L5-S1: No significant disc bulge, neuroforaminal narrowing or spinal canal stenosis.  Impression: No significant disc bulge, neuroforaminal narrowing or spinal canal stenosis.  Lumbar vertebral body heights and alignment appear maintained.  There is patchy T1 hypointensity throughout the visualized marrow suspicious for anemia or other hematopoietic abnormality.    Point of Service: [facility]    Electronically signed by: Ayo Fernandez  Date:    08/12/2022  Time:    13:12       Office Visit on 08/03/2022   Component Date Value Ref Range Status    POC Amphetamines 08/03/2022 Negative  Negative, Inconclusive Final-Edited    POC Barbiturates 08/03/2022 Negative  Negative, Inconclusive Final-Edited    POC Benzodiazepines 08/03/2022 Negative  Negative, Inconclusive Final-Edited    POC Cocaine 08/03/2022 Negative  Negative, Inconclusive Final-Edited    POC THC 08/03/2022 Negative  Negative, Inconclusive Final-Edited    POC Methadone 08/03/2022 Negative  Negative, Inconclusive Final-Edited    POC Methamphetamine 08/03/2022 Negative  Negative, Inconclusive Final-Edited    POC Opiates 08/03/2022 Negative  Negative, Inconclusive Corrected    POC Oxycodone 08/03/2022 Negative  Negative, Inconclusive Final-Edited    POC Phencyclidine 08/03/2022 Negative  Negative, Inconclusive Final-Edited    POC Methylenedioxymethamphetamine * 08/03/2022 Negative  Negative, Inconclusive Final-Edited    POC Tricyclic Antidepressants 08/03/2022 Negative  Negative, Inconclusive Final-Edited    POC Buprenorphine 08/03/2022 Negative   Final-Edited     Acceptable 08/03/2022 Yes   Final-Edited     POC Temperature (Urine) 08/03/2022 92   Final-Edited   Office Visit on 05/09/2022   Component Date Value Ref Range Status    POC Amphetamines 05/09/2022 Negative  Negative, Inconclusive Final-Edited    POC Barbiturates 05/09/2022 Negative  Negative, Inconclusive Final-Edited    POC Benzodiazepines 05/09/2022 Negative  Negative, Inconclusive Final-Edited    POC Cocaine 05/09/2022 Negative  Negative, Inconclusive Final-Edited    POC THC 05/09/2022 Negative  Negative, Inconclusive Final-Edited    POC Methadone 05/09/2022 Negative  Negative, Inconclusive Final-Edited    POC Methamphetamine 05/09/2022 Negative  Negative, Inconclusive Final-Edited    POC Opiates 05/09/2022 Negative  Negative, Inconclusive Final-Edited    POC Oxycodone 05/09/2022 Negative  Negative, Inconclusive Final-Edited    POC Phencyclidine 05/09/2022 Negative  Negative, Inconclusive Final-Edited    POC Methylenedioxymethamphetamine * 05/09/2022 Negative  Negative, Inconclusive Final-Edited    POC Tricyclic Antidepressants 05/09/2022 Negative  Negative, Inconclusive Final-Edited    POC Buprenorphine 05/09/2022 Negative   Final-Edited     Acceptable 05/09/2022 Yes   Final-Edited    POC Temperature (Urine) 05/09/2022 94   Final-Edited           Assessment:      1. Lumbosacral spondylosis without myelopathy    2. Osteoarthrosis multiple sites, not specified as generalized    3. Cervical radiculopathy          Orders Placed This Encounter   Procedures    Ambulatory referral/consult to Physical/Occupational Therapy     Standing Status:   Future     Standing Expiration Date:   9/17/2023     Referral Priority:   Routine     Referral Type:   Physical Medicine     Referral Reason:   Specialty Services Required     Requested Specialty:   Physical Therapy     Number of Visits Requested:   1         A's of Opioid Risk Assessment  Activity:Patient can perform ADL.   Analgesia:Patients pain is partially controlled by current  medication. Patient has tried OTC medications such as Tylenol and Ibuprofen with out relief.   Adverse Effects: Patient denies constipation or sedation.  Aberrant Behavior:  reviewed with no aberrant drug seeking/taking behavior.  Overdose reversal drug naloxone discussed    Drug screen reviewed      Requested Prescriptions     Signed Prescriptions Disp Refills    traMADoL (ULTRAM) 50 mg tablet 90 tablet 1     Sig: Take 1 tablet (50 mg total) by mouth every 8 (eight) hours as needed for Pain.         Plan:    Presumptive drug screen negative, this is expected result, patient takes tramadol, cup does not test for tramadol    Complaint of back pain buttock and leg pain right greater than left leg pain numbness and tingling radicular in nature ongoing for more than 3 months    History anemia patient is supposed to take iron supplements she states it is difficult to take due to constipation    X-ray lumbar spine degenerative changes    MRI lumbar spine degenerative changes no significant neuroforaminal or central canal stenosis noted Tarlov cyst at S2    Requesting to start physical therapy    Physical therapy prescription given to the patient she will do this in [place]    Continue current medication    Follow-up 2 months    Dr. Zambrano, December 2022    Pill count    Physical therapy    Bring original prescription medication bottles/container/box with labels to each visit

## 2022-08-17 ENCOUNTER — OFFICE VISIT (OUTPATIENT)
Dept: PAIN MEDICINE | Facility: CLINIC | Age: 52
End: 2022-08-17
Payer: COMMERCIAL

## 2022-08-17 VITALS
WEIGHT: 189 LBS | BODY MASS INDEX: 33.49 KG/M2 | RESPIRATION RATE: 18 BRPM | HEART RATE: 95 BPM | DIASTOLIC BLOOD PRESSURE: 84 MMHG | SYSTOLIC BLOOD PRESSURE: 125 MMHG | HEIGHT: 63 IN

## 2022-08-17 DIAGNOSIS — M47.817 LUMBOSACRAL SPONDYLOSIS WITHOUT MYELOPATHY: Primary | Chronic | ICD-10-CM

## 2022-08-17 DIAGNOSIS — M54.12 CERVICAL RADICULOPATHY: Chronic | ICD-10-CM

## 2022-08-17 DIAGNOSIS — M89.49 OSTEOARTHROSIS MULTIPLE SITES, NOT SPECIFIED AS GENERALIZED: Chronic | ICD-10-CM

## 2022-08-17 PROCEDURE — 99214 PR OFFICE/OUTPT VISIT, EST, LEVL IV, 30-39 MIN: ICD-10-PCS | Mod: S$PBB,,, | Performed by: PHYSICIAN ASSISTANT

## 2022-08-17 PROCEDURE — 99215 OFFICE O/P EST HI 40 MIN: CPT | Mod: PBBFAC | Performed by: PHYSICIAN ASSISTANT

## 2022-08-17 PROCEDURE — 99214 OFFICE O/P EST MOD 30 MIN: CPT | Mod: S$PBB,,, | Performed by: PHYSICIAN ASSISTANT

## 2022-08-17 RX ORDER — TRAMADOL HYDROCHLORIDE 50 MG/1
50 TABLET ORAL EVERY 8 HOURS PRN
Qty: 90 TABLET | Refills: 1 | Status: SHIPPED | OUTPATIENT
Start: 2022-09-02 | End: 2022-10-26 | Stop reason: SDUPTHER

## 2022-09-22 ENCOUNTER — TELEPHONE (OUTPATIENT)
Dept: PAIN MEDICINE | Facility: CLINIC | Age: 52
End: 2022-09-22
Payer: COMMERCIAL

## 2022-09-22 NOTE — TELEPHONE ENCOUNTER
----- Message from Annmarie Mcknight sent at 9/22/2022 12:37 PM CDT -----  Regarding: PT Order  Updated PT order please fax with today's date  Att to martir [phone]

## 2022-10-26 ENCOUNTER — OFFICE VISIT (OUTPATIENT)
Dept: PAIN MEDICINE | Facility: CLINIC | Age: 52
End: 2022-10-26
Payer: COMMERCIAL

## 2022-10-26 VITALS
DIASTOLIC BLOOD PRESSURE: 83 MMHG | HEIGHT: 63 IN | WEIGHT: 187 LBS | HEART RATE: 92 BPM | BODY MASS INDEX: 33.13 KG/M2 | RESPIRATION RATE: 18 BRPM | SYSTOLIC BLOOD PRESSURE: 136 MMHG

## 2022-10-26 DIAGNOSIS — M54.12 CERVICAL RADICULOPATHY: Primary | Chronic | ICD-10-CM

## 2022-10-26 DIAGNOSIS — M89.49 OSTEOARTHROSIS MULTIPLE SITES, NOT SPECIFIED AS GENERALIZED: Chronic | ICD-10-CM

## 2022-10-26 PROCEDURE — 99214 OFFICE O/P EST MOD 30 MIN: CPT | Mod: PBBFAC | Performed by: PHYSICIAN ASSISTANT

## 2022-10-26 PROCEDURE — 99214 PR OFFICE/OUTPT VISIT, EST, LEVL IV, 30-39 MIN: ICD-10-PCS | Mod: S$PBB,,, | Performed by: PHYSICIAN ASSISTANT

## 2022-10-26 PROCEDURE — 99214 OFFICE O/P EST MOD 30 MIN: CPT | Mod: S$PBB,,, | Performed by: PHYSICIAN ASSISTANT

## 2022-10-26 RX ORDER — TRAMADOL HYDROCHLORIDE 50 MG/1
50 TABLET ORAL EVERY 8 HOURS PRN
Qty: 90 TABLET | Refills: 1 | Status: SHIPPED | OUTPATIENT
Start: 2022-10-31 | End: 2022-12-21 | Stop reason: SDUPTHER

## 2022-10-26 RX ORDER — CELECOXIB 200 MG/1
200 CAPSULE ORAL DAILY
COMMUNITY

## 2022-10-26 NOTE — PROGRESS NOTES
Subjective:         Patient ID: Miriam Winter is a 51 y.o. female.    Chief Complaint: Low-back Pain      Pain  This is a chronic problem. The current episode started more than 1 year ago. The problem occurs daily. The problem has been waxing and waning. Associated symptoms include arthralgias and neck pain. Pertinent negatives include no anorexia, change in bowel habit, chest pain, coughing, diaphoresis, fever, sore throat, vertigo or vomiting.   Review of Systems   Constitutional:  Negative for diaphoresis and fever.   HENT:  Negative for sore throat.    Respiratory:  Negative for cough.    Cardiovascular:  Negative for chest pain.   Gastrointestinal:  Negative for anorexia, change in bowel habit, vomiting and change in bowel habit.   Musculoskeletal:  Positive for arthralgias and neck pain.   Neurological:  Negative for vertigo.         Past Medical History:   Diagnosis Date    Asthma     Degenerative lumbar disc     GERD (gastroesophageal reflux disease)     Hyperlipidemia     Hypertension     Low back pain     Sciatica      Past Surgical History:   Procedure Laterality Date    CHOLECYSTECTOMY      HYSTERECTOMY      INJECTION OF ANESTHETIC AGENT AROUND MEDIAL BRANCH NERVES INNERVATING LUMBAR FACET JOINT Bilateral 9/23/2021    Procedure: Bilateral L4-5,5-S1 MBB;  Surgeon: Mabel Zambrano MD;  Location: [facility];  Service: Pain Management;  Laterality: Bilateral;  PT AWARE TO BE TESTED    INJECTION OF ANESTHETIC AGENT AROUND MEDIAL BRANCH NERVES INNERVATING LUMBAR FACET JOINT Bilateral 10/26/2021    Procedure: Block-nerve-medial branch-lumbar, bilateral L4 through S1;  Surgeon: Mabel Zambrano MD;  Location: [facility];  Service: Pain Management;  Laterality: Bilateral;  PT AWARE ON OV TO BE TESTED    PARTIAL HYSTERECTOMY      RADIOFREQUENCY ABLATION OF LUMBAR MEDIAL BRANCH NERVE AT SINGLE LEVEL Bilateral 12/9/2021    Procedure: Radiofrequency Ablation, Nerve, Spinal, Lumbar, Medial Branch, 1  "Level L4-S1 Right side 1st pre-cert left side in 2 weeks;  Surgeon: Mabel Zambrano MD;  Location: [facility];  Service: Pain Management;  Laterality: Bilateral;  vaccination card scanned in    RADIOFREQUENCY ABLATION OF LUMBAR MEDIAL BRANCH NERVE AT SINGLE LEVEL Left 12/23/2021    Procedure: RADIOFREQUENCY ABLATION, NERVE, SPINAL, LUMBAR, MEDIAL BRANCH, 1 LEVEL   LEFT L4-S1 RFTC  (pt already had right side);  Surgeon: Mabel Zambrano MD;  Location: [facility];  Service: Pain Management;  Laterality: Left;  VAC  CARD SCANNED IN    TUBAL LIGATION       Social History     Socioeconomic History    Marital status: Legally    Tobacco Use    Smoking status: Never    Smokeless tobacco: Never   Substance and Sexual Activity    Alcohol use: Yes     Comment: occasionally     Family History   Problem Relation Age of Onset    Hypertension Mother     Diabetes Mellitus Mother     Arthritis Mother     Hypertension Father     Diabetes Mellitus Father      Review of patient's allergies indicates:   Allergen Reactions    Pcn [penicillins]         Objective:  Vitals:    10/26/22 1329   BP: 136/83   Pulse: 92   Resp: 18   Weight: 84.8 kg (187 lb)   Height: 5' 3" (1.6 m)   PainSc:   5         Physical Exam  Vitals and nursing note reviewed. Exam conducted with a chaperone present.   Constitutional:       General: She is awake. She is not in acute distress.     Appearance: Normal appearance. She is not ill-appearing, toxic-appearing or diaphoretic.   HENT:      Head: Normocephalic and atraumatic.      Nose: Nose normal.      Mouth/Throat:      Mouth: Mucous membranes are moist.      Pharynx: Oropharynx is clear.   Eyes:      Conjunctiva/sclera: Conjunctivae normal.      Pupils: Pupils are equal, round, and reactive to light.   Cardiovascular:      Rate and Rhythm: Normal rate.   Pulmonary:      Effort: Pulmonary effort is normal. No respiratory distress.   Abdominal:      Palpations: Abdomen is soft.      " Tenderness: There is no guarding.   Musculoskeletal:         General: Normal range of motion.      Cervical back: Normal range of motion and neck supple. Tenderness present. No rigidity.      Thoracic back: Tenderness present.      Lumbar back: Tenderness present.   Skin:     General: Skin is warm and dry.      Coloration: Skin is not jaundiced or pale.   Neurological:      General: No focal deficit present.      Mental Status: She is alert and oriented to person, place, and time. Mental status is at baseline.      Cranial Nerves: No cranial nerve deficit (II-XII).   Psychiatric:         Mood and Affect: Mood normal.         Behavior: Behavior normal. Behavior is cooperative.         Thought Content: Thought content normal.         MRI Lumbar Spine Without Contrast  Narrative: EXAMINATION:  MRI LUMBAR SPINE WITHOUT CONTRAST    CLINICAL HISTORY:  Radiculopathy, lumbar regionLumbar radiculopathy, symptoms persist with conservative treatment;    COMPARISON:  None    TECHNIQUE:  Multiplanar MRI imaging of the lumbar spine was performed without the use of intravenous contrast.    FINDINGS:  Lumbar vertebral body heights and alignment appear maintained.  There is patchy T1 hypointensity throughout the visualized marrow suspicious for anemia or other hematopoietic abnormality.  Tarlov cyst demonstrated at the S2 level to the right of midline measuring up to 1.5 cm.    Intervertebral disc space levels:    L1-L2: No significant disc bulge, neuroforaminal narrowing or spinal canal stenosis.    L2-L3: No significant disc bulge, neuroforaminal narrowing or spinal canal stenosis.    L3-L4: No significant disc bulge, neuroforaminal narrowing or spinal canal stenosis.    L4-L5: No significant disc bulge, neuroforaminal narrowing or spinal canal stenosis.    L5-S1: No significant disc bulge, neuroforaminal narrowing or spinal canal stenosis.  Impression: No significant disc bulge, neuroforaminal narrowing or spinal canal stenosis.   Lumbar vertebral body heights and alignment appear maintained.  There is patchy T1 hypointensity throughout the visualized marrow suspicious for anemia or other hematopoietic abnormality.    Point of Service: [facility]    Electronically signed by: Ayo Fernandez  Date:    08/12/2022  Time:    13:12       Office Visit on 08/03/2022   Component Date Value Ref Range Status    POC Amphetamines 08/03/2022 Negative  Negative, Inconclusive Final-Edited    POC Barbiturates 08/03/2022 Negative  Negative, Inconclusive Final-Edited    POC Benzodiazepines 08/03/2022 Negative  Negative, Inconclusive Final-Edited    POC Cocaine 08/03/2022 Negative  Negative, Inconclusive Final-Edited    POC THC 08/03/2022 Negative  Negative, Inconclusive Final-Edited    POC Methadone 08/03/2022 Negative  Negative, Inconclusive Final-Edited    POC Methamphetamine 08/03/2022 Negative  Negative, Inconclusive Final-Edited    POC Opiates 08/03/2022 Negative  Negative, Inconclusive Corrected    POC Oxycodone 08/03/2022 Negative  Negative, Inconclusive Final-Edited    POC Phencyclidine 08/03/2022 Negative  Negative, Inconclusive Final-Edited    POC Methylenedioxymethamphetamine * 08/03/2022 Negative  Negative, Inconclusive Final-Edited    POC Tricyclic Antidepressants 08/03/2022 Negative  Negative, Inconclusive Final-Edited    POC Buprenorphine 08/03/2022 Negative   Final-Edited     Acceptable 08/03/2022 Yes   Final-Edited    POC Temperature (Urine) 08/03/2022 92   Final-Edited   Office Visit on 05/09/2022   Component Date Value Ref Range Status    POC Amphetamines 05/09/2022 Negative  Negative, Inconclusive Final-Edited    POC Barbiturates 05/09/2022 Negative  Negative, Inconclusive Final-Edited    POC Benzodiazepines 05/09/2022 Negative  Negative, Inconclusive Final-Edited    POC Cocaine 05/09/2022 Negative  Negative, Inconclusive Final-Edited    POC THC 05/09/2022 Negative  Negative, Inconclusive Final-Edited    POC  Methadone 05/09/2022 Negative  Negative, Inconclusive Final-Edited    POC Methamphetamine 05/09/2022 Negative  Negative, Inconclusive Final-Edited    POC Opiates 05/09/2022 Negative  Negative, Inconclusive Final-Edited    POC Oxycodone 05/09/2022 Negative  Negative, Inconclusive Final-Edited    POC Phencyclidine 05/09/2022 Negative  Negative, Inconclusive Final-Edited    POC Methylenedioxymethamphetamine * 05/09/2022 Negative  Negative, Inconclusive Final-Edited    POC Tricyclic Antidepressants 05/09/2022 Negative  Negative, Inconclusive Final-Edited    POC Buprenorphine 05/09/2022 Negative   Final-Edited     Acceptable 05/09/2022 Yes   Final-Edited    POC Temperature (Urine) 05/09/2022 94   Final-Edited         No orders of the defined types were placed in this encounter.      Requested Prescriptions     Signed Prescriptions Disp Refills    traMADoL (ULTRAM) 50 mg tablet 90 tablet 1     Sig: Take 1 tablet (50 mg total) by mouth every 8 (eight) hours as needed for Pain.       Assessment:     1. Cervical radiculopathy    2. Osteoarthrosis multiple sites, not specified as generalized         A's of Opioid Risk Assessment  Activity:Patient can perform ADL.   Analgesia:Patients pain is partially controlled by current medication. Patient has tried OTC medications such as Tylenol and Ibuprofen with out relief.   Adverse Effects: Patient denies constipation or sedation.  Aberrant Behavior:  reviewed with no aberrant drug seeking/taking behavior.  Overdose reversal drug naloxone discussed    Drug screen reviewed      Plan:    Presumptive drug screen negative, this is expected result, patient takes tramadol, cup does not test for tramadol    History anemia patient is supposed to take iron supplements she states it is difficult to take due to constipation    She states current medications helping control her chronic back pain joint pain     She would like to continue conservative management    MRI lumbar  spine degenerative changes no significant neuroforaminal or central canal stenosis noted Tarlov cyst at S2    Continue current medication    Follow-up 2 months    Dr. Zambrano, December 2022    Bring original prescription medication bottles/container/box with labels to each visit    Pill count    Physical therapy    Massage therapy declines

## 2022-11-02 NOTE — PROGRESS NOTES
Subjective:       Patient ID: Miriam Winter is a 51 y.o. female.    Chief Complaint:  No chief complaint on file.      History of Present Illness  This pleasant 51 year old right handed  female presents to the clinic with  as a new patient referral from BAN Hernandez for numbness to the hands and feet. Patient states numbness, tingling and burning in the bilateral hands and feet started over one year ago and have progressively gotten worse. She rates the burning, numbness, and tingling as a 5 on a scale of 0 to 5 and is intermittent with pain that she rates as a 1 to 2 on a scale of 0 to 10. The pain is described as dull. Symptoms are worse at night and with activity. She denies diabetes and states she has not had an EMG NCS in the past. She reports back pain in the lumbar region that radiates to the legs and is constant. She rates the back pain as a 6 to 7 on a scale of 0 to 10 with aching pain. She reports a back injury two years ago but denies any back surgery. MRI of the lumbar spine was unrevealing. She reports neck pain started a few months ago that radiates to the arms and is intermittent. She rates the neck pain as a 5 on a scale of 0 to 10 with aching pain. She denies neck injury or neck surgery. She has not had any imaging of the neck. She is currently doing physical therapy for the neck and back pain. She states she is followed by [facility] for the neck and back pain. She is currently on Celebrex, flexeril and gabapentin. She states she takes the gabapentin one or two times a day. Discussed with her to take the gabapentin as prescribed three times a day and not as needed. We will consider adding Cymbalta after the work up is completed. Her PMH includes asthma, GERD, HTN, and anemia. Her family medical history includes DM, asthma, arthritis, and HTN. She denies smoking and is a social drinker. She states she had a partial hysterectomy in 2007. Discussed the plan in detail  with Neurologist Dr. KIRBY Taveras and the patient and they are in agreement with the plan. All her questions were answered at today's visit.      MRI of the lumbar spine without contrast done on August 12, 2022 showed No significant disc bulge, neuroforaminal narrowing or spinal canal stenosis.  Lumbar vertebral body heights and alignment appear maintained.  There is patchy T1 hypointensity throughout the visualized marrow suspicious for anemia or other hematopoietic abnormality.      Review of Systems  Review of Systems   Constitutional:  Negative for activity change, diaphoresis, fever and unexpected weight change.   HENT:  Negative for congestion, ear pain, facial swelling, hearing loss, tinnitus, trouble swallowing and voice change.    Eyes:  Negative for photophobia, pain and visual disturbance.   Respiratory:  Negative for chest tightness, shortness of breath and wheezing.    Cardiovascular:  Negative for chest pain, palpitations and leg swelling.   Gastrointestinal:  Negative for constipation, diarrhea, nausea and vomiting.   Genitourinary:  Negative for difficulty urinating.   Musculoskeletal:  Negative for back pain, gait problem, neck pain and neck stiffness.   Skin:  Negative for color change, pallor, rash and wound.   Neurological:  Positive for numbness. Negative for dizziness, tremors, seizures, syncope, facial asymmetry, speech difficulty, weakness, light-headedness and headaches.   Psychiatric/Behavioral:  Negative for agitation, behavioral problems, confusion, decreased concentration and hallucinations. The patient is not nervous/anxious and is not hyperactive.      Objective:      Neurologic Exam     Mental Status   Oriented to person, place, and time.   Oriented to person.   Oriented to place.   Oriented to time.   Attention: normal. Concentration: normal.   Speech: speech is normal   Level of consciousness: alert  Knowledge: good.     Cranial Nerves     CN II   Visual fields full to confrontation.      CN III, IV, VI   Pupils are equal, round, and reactive to light.  Extraocular motions are normal.   Right pupil: Size: 3 mm. Shape: regular. Reactivity: brisk.   Left pupil: Size: 3 mm. Shape: regular. Reactivity: brisk.   CN III: no CN III palsy  CN VI: no CN VI palsy    CN V   Facial sensation intact.     CN VII   Facial expression full, symmetric.     CN VIII   CN VIII normal.   Hearing: intact    CN IX, X   CN IX normal.   CN X normal.     CN XI   CN XI normal.     CN XII   CN XII normal.     Motor Exam   Muscle bulk: normal  Overall muscle tone: normal  Right arm pronator drift: absent  Left arm pronator drift: absent    Strength   Right deltoid: 5/5  Left deltoid: 5/5  Right biceps: 5/5  Left biceps: 5/5  Right triceps: 5/5  Left triceps: 5/5  Right quadriceps: 5/5  Left quadriceps: 5/5  Right hamstrin/5  Left hamstrin/5    Sensory Exam   Light touch normal.   Vibration normal.   Proprioception normal.   Pinprick normal.     Phalen and Tinel's sign positive      Gait, Coordination, and Reflexes     Gait  Gait: normal    Coordination   Romberg: negative  Finger to nose coordination: normal  Heel to shin coordination: normal  Tandem walking coordination: normal    Tremor   Resting tremor: absent  Intention tremor: absent  Action tremor: absent    Reflexes   Right brachioradialis: 2+  Left brachioradialis: 2+  Right biceps: 2+  Left biceps: 2+  Right triceps: 2+  Left triceps: 2+  Right patellar: 2+  Left patellar: 2+  Right achilles: 2+  Left achilles: 2+  Right : 4+  Left : 4+    Physical Exam  Constitutional:       General: She is not in acute distress.  HENT:      Head: Normocephalic.      Nose: Nose normal.      Mouth/Throat:      Mouth: Mucous membranes are moist.   Eyes:      Extraocular Movements: Extraocular movements intact and EOM normal.      Pupils: Pupils are equal, round, and reactive to light.   Cardiovascular:      Rate and Rhythm: Normal rate and regular rhythm.      Heart  sounds: Normal heart sounds. No murmur heard.  Pulmonary:      Effort: Pulmonary effort is normal. No respiratory distress.      Breath sounds: Normal breath sounds. No wheezing, rhonchi or rales.   Musculoskeletal:         General: No swelling, tenderness, deformity or signs of injury. Normal range of motion.      Cervical back: Normal range of motion. No rigidity or tenderness.      Right lower leg: No edema.      Left lower leg: No edema.   Skin:     General: Skin is warm and dry.      Capillary Refill: Capillary refill takes less than 2 seconds.      Coloration: Skin is not jaundiced or pale.      Findings: No bruising, erythema, lesion or rash.   Neurological:      Mental Status: She is alert and oriented to person, place, and time.      Coordination: Finger-Nose-Finger Test, Heel to Shin Test and Romberg Test normal.      Gait: Gait is intact. Tandem walk normal.      Deep Tendon Reflexes:      Reflex Scores:       Tricep reflexes are 2+ on the right side and 2+ on the left side.       Bicep reflexes are 2+ on the right side and 2+ on the left side.       Brachioradialis reflexes are 2+ on the right side and 2+ on the left side.       Patellar reflexes are 2+ on the right side and 2+ on the left side.       Achilles reflexes are 2+ on the right side and 2+ on the left side.  Psychiatric:         Mood and Affect: Mood normal.         Speech: Speech normal.         Behavior: Behavior normal.         Assessment:     Problem List Items Addressed This Visit          Neuro    Cervical radiculopathy (Chronic)    Relevant Orders    Ambulatory referral/consult to Neurology    Lumbosacral radiculopathy (Chronic)    Relevant Orders    Ambulatory referral/consult to Neurology       Endocrine    Vitamin D deficiency       Other    Paresthesia - Primary    Relevant Orders    Ambulatory referral/consult to Neurology    Protein Electrophoresis, Serum with Reflex COMFORT    Sedimentation Rate    KATY EIA w/ Reflex to dsDNA/DAVI      Other Visit Diagnoses       Neck pain        Relevant Orders    Protein Electrophoresis, Serum with Reflex COMFORT    Sedimentation Rate    KATY EIA w/ Reflex to dsDNA/DAVI    On long term drug therapy        Relevant Orders    Protein Electrophoresis, Serum with Reflex COMFORT    Sedimentation Rate    KATY EIA w/ Reflex to dsDNA/DAVI              Plan:     Continue Vitamin D 50,000 IU one capsule weekly   Referral for EMG NCS all 4 extremities with Dr. TOM Salazar   Xray cervical spine   Labs: PPN   Continue physical therapy  Keep appointment with pain treatment for back and neck pain   Take gabapentin as prescribed   Consider adding Cymbalta in the future   Can try wrist splints at bedtime  Follow up with neurology in 3 months or sooner if needed

## 2022-11-07 ENCOUNTER — OFFICE VISIT (OUTPATIENT)
Dept: NEUROLOGY | Facility: CLINIC | Age: 52
End: 2022-11-07
Payer: COMMERCIAL

## 2022-11-07 ENCOUNTER — HOSPITAL ENCOUNTER (OUTPATIENT)
Dept: RADIOLOGY | Facility: HOSPITAL | Age: 52
Discharge: HOME | End: 2022-11-07
Attending: NURSE PRACTITIONER
Payer: COMMERCIAL

## 2022-11-07 VITALS
DIASTOLIC BLOOD PRESSURE: 84 MMHG | HEART RATE: 81 BPM | SYSTOLIC BLOOD PRESSURE: 142 MMHG | OXYGEN SATURATION: 97 % | HEIGHT: 63 IN | WEIGHT: 187.5 LBS | BODY MASS INDEX: 33.22 KG/M2

## 2022-11-07 DIAGNOSIS — M54.12 CERVICAL RADICULOPATHY: Chronic | ICD-10-CM

## 2022-11-07 DIAGNOSIS — E55.9 VITAMIN D DEFICIENCY: ICD-10-CM

## 2022-11-07 DIAGNOSIS — M54.17 LUMBOSACRAL RADICULOPATHY: Chronic | ICD-10-CM

## 2022-11-07 DIAGNOSIS — M54.2 NECK PAIN: ICD-10-CM

## 2022-11-07 DIAGNOSIS — R20.2 PARESTHESIA: Primary | ICD-10-CM

## 2022-11-07 DIAGNOSIS — Z79.899 ON LONG TERM DRUG THERAPY: ICD-10-CM

## 2022-11-07 PROCEDURE — 72040 X-RAY EXAM NECK SPINE 2-3 VW: CPT | Mod: 26,,, | Performed by: RADIOLOGY

## 2022-11-07 PROCEDURE — 99204 OFFICE O/P NEW MOD 45 MIN: CPT | Mod: S$PBB,,, | Performed by: NURSE PRACTITIONER

## 2022-11-07 PROCEDURE — 72040 X-RAY EXAM NECK SPINE 2-3 VW: CPT | Mod: TC

## 2022-11-07 PROCEDURE — 99214 OFFICE O/P EST MOD 30 MIN: CPT | Mod: PBBFAC | Performed by: NURSE PRACTITIONER

## 2022-11-07 PROCEDURE — 99204 PR OFFICE/OUTPT VISIT, NEW, LEVL IV, 45-59 MIN: ICD-10-PCS | Mod: S$PBB,,, | Performed by: NURSE PRACTITIONER

## 2022-11-07 PROCEDURE — 72040 XR CERVICAL SPINE AP LATERAL: ICD-10-PCS | Mod: 26,,, | Performed by: RADIOLOGY

## 2022-11-07 NOTE — PATIENT INSTRUCTIONS
Continue Vitamin D 50,000 IU one capsule weekly   Referral for EMG NCS all 4 extremities with Dr. TOM Salazar   Xray cervical spine   Labs: PPN   Continue physical therapy  Keep appointment with pain treatment for back and neck pain   Take gabapentin as prescribed   Consider adding Cymbalta in the future   Can try wrist splints at bedtime  Follow up with neurology in 3 months or sooner if needed

## 2022-11-09 ENCOUNTER — TELEPHONE (OUTPATIENT)
Dept: NEUROLOGY | Facility: CLINIC | Age: 52
End: 2022-11-09
Payer: COMMERCIAL

## 2022-11-09 NOTE — TELEPHONE ENCOUNTER
Left VM to return call with any questions  ----- Message from Ghanshyam Goncalves NP sent at 11/7/2022  4:00 PM CST -----  Please let patient know her xray of the neck showed mild arthritic changes, thanks

## 2022-12-21 ENCOUNTER — OFFICE VISIT (OUTPATIENT)
Dept: PAIN MEDICINE | Facility: CLINIC | Age: 52
End: 2022-12-21
Payer: COMMERCIAL

## 2022-12-21 VITALS
HEART RATE: 85 BPM | DIASTOLIC BLOOD PRESSURE: 101 MMHG | WEIGHT: 187 LBS | BODY MASS INDEX: 33.13 KG/M2 | HEIGHT: 63 IN | SYSTOLIC BLOOD PRESSURE: 158 MMHG

## 2022-12-21 DIAGNOSIS — Z79.899 ENCOUNTER FOR LONG-TERM (CURRENT) USE OF OTHER MEDICATIONS: Primary | ICD-10-CM

## 2022-12-21 DIAGNOSIS — M47.816 SPONDYLOSIS OF LUMBAR REGION WITHOUT MYELOPATHY OR RADICULOPATHY: ICD-10-CM

## 2022-12-21 DIAGNOSIS — M89.49 OSTEOARTHROSIS MULTIPLE SITES, NOT SPECIFIED AS GENERALIZED: Chronic | ICD-10-CM

## 2022-12-21 DIAGNOSIS — M54.12 CERVICAL RADICULOPATHY: Chronic | ICD-10-CM

## 2022-12-21 LAB

## 2022-12-21 PROCEDURE — 99215 OFFICE O/P EST HI 40 MIN: CPT | Mod: PBBFAC | Performed by: PAIN MEDICINE

## 2022-12-21 PROCEDURE — 99214 PR OFFICE/OUTPT VISIT, EST, LEVL IV, 30-39 MIN: ICD-10-PCS | Mod: S$PBB,,, | Performed by: PAIN MEDICINE

## 2022-12-21 PROCEDURE — 99214 OFFICE O/P EST MOD 30 MIN: CPT | Mod: S$PBB,,, | Performed by: PAIN MEDICINE

## 2022-12-21 PROCEDURE — 80305 DRUG TEST PRSMV DIR OPT OBS: CPT | Mod: PBBFAC | Performed by: PAIN MEDICINE

## 2022-12-21 RX ORDER — TRAMADOL HYDROCHLORIDE 50 MG/1
50 TABLET ORAL EVERY 8 HOURS PRN
Qty: 90 TABLET | Refills: 1 | Status: SHIPPED | OUTPATIENT
Start: 2022-12-28 | End: 2023-01-30 | Stop reason: SDUPTHER

## 2022-12-21 NOTE — PATIENT INSTRUCTIONS
BILATERAL L4-S1 [facility]  1-           Procedure Instructions:    Nothing to eat or drink for 8 hours or after midnight including gum, candy, mints, or tobacco products.  If you are scheduled for 1:30 or later nothing to eat or drink after 5 a.m. the morning of the procedure, including gum, candy, mints, or tobacco products.  Must have a  at least 18 yrs of age to stay present at all times  No Diabetic medications the morning of procedure, check blood sugar the morning of procedure, if it is greater than 200 call the office at [phone]  If you are started on antibiotics or have been prescribed antibiotics, have a fever, or have any other type of infection call to reschedule procedure.  If you take blood pressure medications you can take it at your regular scheduled time with a small sip of WATER!    HOLD ASPIRIN AND ASPIRIN PRODUCTS  (ASPIRIN, BC POWDER ETC. ) FOR 7 DAYS  PRIOR TO PROCEDURE  HOLD NSAIDS( ibuprofen, mobic, meloxicam, advil, diclofenac, naproxen, relafen, celebrex,  methotrexate, aleve etc....)  FOR 3 DAYS   PRIOR TO PROCEDURE

## 2022-12-21 NOTE — PROGRESS NOTES
She Disclaimer: This note has been generated using voice-recognition software. There may be typographical errors that have been missed during proof-reading        Patient ID: Miriam Winter is a 52 y.o. female.      Chief Complaint: Back Pain      52-year-old female returns for re-evaluation of intractable lower back pain.  She received a bilateral lumbar medial branch radiofrequency procedure,  December 23, 2022, and experienced greater than 80% pain relief for about 6 months.  Her pain has returned and  she notes difficulty with prolonged sitting , standing  and walking.  She has a pending EMG scheduled in [place].  MRI of the lumbar spine was essentially unremarkable.  Tramadol is providing some transient relief.  NSAIDs failed to provide any improvement.  She returns today to discuss repeating the medial branch radiofrequency procedure for lower back pain and spondylosis.          Pain Assessment  Pain Score:   6  Pain Location: Back  Pain Orientation: Right  Pain Radiating Towards: lower back pain radiates to right leg  Pain Descriptors: Aching, Constant, Dull  Pain Frequency: Continuous  Pain Onset: Awakened from sleep  Clinical Progression: Gradually worsening  Aggravating Factors: Bending, Standing, Walking  Pain Intervention(s): Medication (See eMAR), Rest      A's of Opioid Risk Assessment  Activity:Patient is unable to perform ADL.   Analgesia:Patients pain is not controlled by current medication.   Adverse Effects: Patient denies constipation or sedation.  Aberrant Behavior:  reviewed with no aberrant drug seeking/taking behavior.      Patient denies any suicidal or homicidal ideations    Physical Therapy/Home Exercise: yes      X-Ray Cervical Spine AP And Lateral  Narrative: EXAMINATION:  XR CERVICAL SPINE AP LATERAL    CLINICAL HISTORY:  Cervicalgia    TECHNIQUE:  AP and lateral views of the cervical spine were performed.    COMPARISON:  None.    FINDINGS:  Vertebral body heights and alignment are  maintained.  Mild degenerative endplate and facet changes are seen throughout the cervical spine.  Impression: Mild degenerative changes.    Electronically signed by: Garrett Balbuena  Date:    11/07/2022  Time:    14:41      Review of Systems   Constitutional: Negative.    HENT: Negative.     Eyes: Negative.    Respiratory: Negative.     Cardiovascular: Negative.    Gastrointestinal: Negative.    Endocrine: Negative.    Genitourinary: Negative.    Musculoskeletal:  Positive for arthralgias, back pain and myalgias.   Integumentary:  Negative.   Neurological: Negative.    Hematological: Negative.    Psychiatric/Behavioral:  Positive for sleep disturbance.            Past Medical History:   Diagnosis Date    Asthma     Degenerative lumbar disc     GERD (gastroesophageal reflux disease)     Hyperlipidemia     Hypertension     Low back pain     Sciatica      Past Surgical History:   Procedure Laterality Date    CHOLECYSTECTOMY      HYSTERECTOMY      INJECTION OF ANESTHETIC AGENT AROUND MEDIAL BRANCH NERVES INNERVATING LUMBAR FACET JOINT Bilateral 9/23/2021    Procedure: Bilateral L4-5,5-S1 MBB;  Surgeon: Mabel Zambrano MD;  Location: [facility];  Service: Pain Management;  Laterality: Bilateral;  PT AWARE TO BE TESTED    INJECTION OF ANESTHETIC AGENT AROUND MEDIAL BRANCH NERVES INNERVATING LUMBAR FACET JOINT Bilateral 10/26/2021    Procedure: Block-nerve-medial branch-lumbar, bilateral L4 through S1;  Surgeon: Mabel Zambrano MD;  Location: [facility];  Service: Pain Management;  Laterality: Bilateral;  PT AWARE ON OV TO BE TESTED    PARTIAL HYSTERECTOMY      RADIOFREQUENCY ABLATION OF LUMBAR MEDIAL BRANCH NERVE AT SINGLE LEVEL Bilateral 12/9/2021    Procedure: Radiofrequency Ablation, Nerve, Spinal, Lumbar, Medial Branch, 1 Level L4-S1 Right side 1st pre-cert left side in 2 weeks;  Surgeon: Mabel Zambrano MD;  Location: [facility];  Service: Pain Management;  Laterality: Bilateral;  vaccination  card scanned in    RADIOFREQUENCY ABLATION OF LUMBAR MEDIAL BRANCH NERVE AT SINGLE LEVEL Left 12/23/2021    Procedure: RADIOFREQUENCY ABLATION, NERVE, SPINAL, LUMBAR, MEDIAL BRANCH, 1 LEVEL   LEFT L4-S1 RFTC  (pt already had right side);  Surgeon: Mabel Zambrano MD;  Location: [facility];  Service: Pain Management;  Laterality: Left;  VAC  CARD SCANNED IN    TUBAL LIGATION       Social History     Socioeconomic History    Marital status: Legally    Tobacco Use    Smoking status: Never    Smokeless tobacco: Never   Substance and Sexual Activity    Alcohol use: Yes     Comment: occasionally     Family History   Problem Relation Age of Onset    Hypertension Mother     Diabetes Mellitus Mother     Arthritis Mother     Hypertension Father     Diabetes Mellitus Father      Review of patient's allergies indicates:   Allergen Reactions    Pcn [penicillins]      has a current medication list which includes the following prescription(s): albuterol, albuterol, amlodipine, budesonide-formoterol 160-4.5 mcg, celecoxib, cetirizine, cyanocobalamin, cyclobenzaprine, ergocalciferol, etodolac, iron bisglycinate chelate, fluticasone propionate, magnesium oxide, omeprazole, potassium chloride, valsartan-hydrochlorothiazide, and [START ON 12/28/2022] tramadol, and the following Facility-Administered Medications: sodium chloride 0.9%.      Objective:  Vitals:    12/21/22 1345   BP: (!) 158/101   Pulse: 85        Physical Exam  Vitals and nursing note reviewed. Chaperone present: Accompanied by her .   Constitutional:       General: She is not in acute distress.     Appearance: Normal appearance. She is not ill-appearing, toxic-appearing or diaphoretic.      Comments: Difficulty going from sitting to standing position   HENT:      Head: Normocephalic and atraumatic.      Nose: Nose normal.      Mouth/Throat:      Mouth: Mucous membranes are moist.   Eyes:      Extraocular Movements: Extraocular movements intact.       Pupils: Pupils are equal, round, and reactive to light.   Cardiovascular:      Rate and Rhythm: Normal rate and regular rhythm.      Heart sounds: Normal heart sounds.   Pulmonary:      Effort: Pulmonary effort is normal. No respiratory distress.      Breath sounds: Normal breath sounds. No stridor. No wheezing or rhonchi.   Abdominal:      General: Bowel sounds are normal.      Palpations: Abdomen is soft.   Musculoskeletal:         General: No swelling or deformity.      Cervical back: Normal and normal range of motion. No spasms or tenderness. No pain with movement. Normal range of motion.      Thoracic back: Normal.      Lumbar back: Spasms, tenderness and bony tenderness present. Decreased range of motion. Negative right straight leg raise test and negative left straight leg raise test. No scoliosis.      Right lower leg: No edema.      Left lower leg: No edema.      Comments: Lumbar pain with flexion, extension lateral rotation.  Bilateral lumbar facet tenderness to palpation from L3-S1.   Skin:     General: Skin is warm.   Neurological:      General: No focal deficit present.      Mental Status: She is alert and oriented to person, place, and time. Mental status is at baseline.      Cranial Nerves: No cranial nerve deficit.      Sensory: Sensation is intact. No sensory deficit.      Motor: No weakness.      Coordination: Coordination normal.      Gait: Gait normal.      Deep Tendon Reflexes: Reflexes are normal and symmetric.   Psychiatric:         Mood and Affect: Mood normal.         Behavior: Behavior normal.         Assessment:      1. Encounter for long-term (current) use of other medications    2. Spondylosis of lumbar region without myelopathy or radiculopathy    3. Osteoarthrosis multiple sites, not specified as generalized    4. Cervical radiculopathy            Plan:  1. reviewed  2.Addiction, Dependency, Tolerance, Opioid abuse-misuse, Death, Diversion Discussed. Overdose reversal drug  Naloxone discussed.  3.Refill/Continue medications for pain control and function       Requested Prescriptions     Signed Prescriptions Disp Refills    traMADoL (ULTRAM) 50 mg tablet 90 tablet 1     Sig: Take 1 tablet (50 mg total) by mouth every 8 (eight) hours as needed for Pain.     4.Urine drug screen point of care obtained and consistent with prescribed medications and medication refill date  5.Indication: The patient has clinical and radiologic findings suggestive of recurrent facet mediated pain.  The patient is s/p bilateral lumbar L3/4, L4/5, and L5/S1 RFA with over 50% improvement of their pain lasting at least 6 months.      Orders Placed This Encounter   Procedures    POCT Urine Drug Screen Presump     Interpretive Information:     Negative:  No drug detected at the cut off level.   Positive:  This result represents presumptive positive for the   tested drug, other substances may yield a positive response other   than the analyte of interest. This result should be utilized for   diagnostic purpose only. Confirmation testing will be performed upon physician request only.       Case Request Operating Room: Bilateral L4-5,5-S1 [facility]     Order Specific Question:   Medical Necessity:     Answer:   Medically Non-Urgent [100]     Order Specific Question:   CPT Code:     Answer:   OR DESTROY LUMB/SAC FACET JNT [96386]     Order Specific Question:   Positioning:     Answer:   Prone [1003]     Order Specific Question:   Post-Procedure Disposition:     Answer:   PACU [1]     Order Specific Question:   Estimated Length of Stay:     Answer:   0 midnight     Order Specific Question:   Implant Required:     Answer:   No [1001]     Order Specific Question:   Is an on-site pathologist required for this procedure?     Answer:   N/A      6.Indications for this procedure for this specific patient include the following   - Pt has had symptoms for three months with moderate to severe pain with functional impairment rated of  7/10 pain.   - Pain non-responsive to conservative care.    - Pain predominately axial and not associated with radiculopathy or claudication.    - No non-facet pathology as source of pain.    - Clinical assessment implicates facet joint as putative pain source.    - Pain is exacerbated by extension or prolonged sitting/standing and relieved by rest.    - No unexplained neurologic deficit.    - No history of coagulopathy, infection or unstable medical conditions.    - Pain is causing significant functional limitation resulting in diminished quality of life and impaired age appropriate ADL's.   - Clinical assessment implicates facet joint as putative source of pain  - Repeat injections not done prior to 7 days   - no more than 2 levels will be done per side      7.Monitored Anesthesia Care medical necessity authorization request:    Monitor anesthesia request is medically indicated for the scheduled nerve block procedure due to:  - needle phobia and anxiety, placing  the patient at risk during the provided service.  -patient has severe problems with muscles and muscle spasticity that makes it hard to lie still  -patient suffers from chronic pain and is unable to function due to  diminished ADLs    .8.The planned medically necessary  surgical procedure is performed in a hospital outpatient department and not in an ambulatory surgical center due to:     -there is no geographically assessable ambulatory surgery center that has the  necessary equipment and fluoroscopy needed for the procedure     -there is no geographically assessable ambulatory surgical center available at which the physician has privileges     -an ASC's  specific  guideline regarding the individuals weight or health conditions that prevent the use of an ASC       report:  Reviewed and consistent with medication use as prescribed.      The total time spent for evaluation and management on 12/21/2022 including reviewing separately obtained history,  performing a medically appropriate exam and evaluation, documenting clinical information in the health record, independently interpreting results and communicating them to the patient/family/caregiver, and ordering medications/tests/procedures was between 15-29 minutes.    The above plan and management options were discussed at length with patient. Patient is in agreement with the above and verbalized understanding. It will be communicated with the referring physician via electronic record, fax, or mail.

## 2022-12-21 NOTE — H&P (VIEW-ONLY)
She Disclaimer: This note has been generated using voice-recognition software. There may be typographical errors that have been missed during proof-reading        Patient ID: Miriam Winter is a 52 y.o. female.      Chief Complaint: Back Pain      52-year-old female returns for re-evaluation of intractable lower back pain.  She received a bilateral lumbar medial branch radiofrequency procedure,  December 23, 2022, and experienced greater than 80% pain relief for about 6 months.  Her pain has returned and  she notes difficulty with prolonged sitting , standing  and walking.  She has a pending EMG scheduled in [place].  MRI of the lumbar spine was essentially unremarkable.  Tramadol is providing some transient relief.  NSAIDs failed to provide any improvement.  She returns today to discuss repeating the medial branch radiofrequency procedure for lower back pain and spondylosis.          Pain Assessment  Pain Score:   6  Pain Location: Back  Pain Orientation: Right  Pain Radiating Towards: lower back pain radiates to right leg  Pain Descriptors: Aching, Constant, Dull  Pain Frequency: Continuous  Pain Onset: Awakened from sleep  Clinical Progression: Gradually worsening  Aggravating Factors: Bending, Standing, Walking  Pain Intervention(s): Medication (See eMAR), Rest      A's of Opioid Risk Assessment  Activity:Patient is unable to perform ADL.   Analgesia:Patients pain is not controlled by current medication.   Adverse Effects: Patient denies constipation or sedation.  Aberrant Behavior:  reviewed with no aberrant drug seeking/taking behavior.      Patient denies any suicidal or homicidal ideations    Physical Therapy/Home Exercise: yes      X-Ray Cervical Spine AP And Lateral  Narrative: EXAMINATION:  XR CERVICAL SPINE AP LATERAL    CLINICAL HISTORY:  Cervicalgia    TECHNIQUE:  AP and lateral views of the cervical spine were performed.    COMPARISON:  None.    FINDINGS:  Vertebral body heights and alignment are  maintained.  Mild degenerative endplate and facet changes are seen throughout the cervical spine.  Impression: Mild degenerative changes.    Electronically signed by: Garrett Balbuena  Date:    11/07/2022  Time:    14:41      Review of Systems   Constitutional: Negative.    HENT: Negative.     Eyes: Negative.    Respiratory: Negative.     Cardiovascular: Negative.    Gastrointestinal: Negative.    Endocrine: Negative.    Genitourinary: Negative.    Musculoskeletal:  Positive for arthralgias, back pain and myalgias.   Integumentary:  Negative.   Neurological: Negative.    Hematological: Negative.    Psychiatric/Behavioral:  Positive for sleep disturbance.            Past Medical History:   Diagnosis Date    Asthma     Degenerative lumbar disc     GERD (gastroesophageal reflux disease)     Hyperlipidemia     Hypertension     Low back pain     Sciatica      Past Surgical History:   Procedure Laterality Date    CHOLECYSTECTOMY      HYSTERECTOMY      INJECTION OF ANESTHETIC AGENT AROUND MEDIAL BRANCH NERVES INNERVATING LUMBAR FACET JOINT Bilateral 9/23/2021    Procedure: Bilateral L4-5,5-S1 MBB;  Surgeon: Mabel Zambrano MD;  Location: [facility];  Service: Pain Management;  Laterality: Bilateral;  PT AWARE TO BE TESTED    INJECTION OF ANESTHETIC AGENT AROUND MEDIAL BRANCH NERVES INNERVATING LUMBAR FACET JOINT Bilateral 10/26/2021    Procedure: Block-nerve-medial branch-lumbar, bilateral L4 through S1;  Surgeon: Mabel Zambrano MD;  Location: [facility];  Service: Pain Management;  Laterality: Bilateral;  PT AWARE ON OV TO BE TESTED    PARTIAL HYSTERECTOMY      RADIOFREQUENCY ABLATION OF LUMBAR MEDIAL BRANCH NERVE AT SINGLE LEVEL Bilateral 12/9/2021    Procedure: Radiofrequency Ablation, Nerve, Spinal, Lumbar, Medial Branch, 1 Level L4-S1 Right side 1st pre-cert left side in 2 weeks;  Surgeon: Mabel Zambrano MD;  Location: [facility];  Service: Pain Management;  Laterality: Bilateral;  vaccination  card scanned in    RADIOFREQUENCY ABLATION OF LUMBAR MEDIAL BRANCH NERVE AT SINGLE LEVEL Left 12/23/2021    Procedure: RADIOFREQUENCY ABLATION, NERVE, SPINAL, LUMBAR, MEDIAL BRANCH, 1 LEVEL   LEFT L4-S1 RFTC  (pt already had right side);  Surgeon: Mabel Zambrano MD;  Location: [facility];  Service: Pain Management;  Laterality: Left;  VAC  CARD SCANNED IN    TUBAL LIGATION       Social History     Socioeconomic History    Marital status: Legally    Tobacco Use    Smoking status: Never    Smokeless tobacco: Never   Substance and Sexual Activity    Alcohol use: Yes     Comment: occasionally     Family History   Problem Relation Age of Onset    Hypertension Mother     Diabetes Mellitus Mother     Arthritis Mother     Hypertension Father     Diabetes Mellitus Father      Review of patient's allergies indicates:   Allergen Reactions    Pcn [penicillins]      has a current medication list which includes the following prescription(s): albuterol, albuterol, amlodipine, budesonide-formoterol 160-4.5 mcg, celecoxib, cetirizine, cyanocobalamin, cyclobenzaprine, ergocalciferol, etodolac, iron bisglycinate chelate, fluticasone propionate, magnesium oxide, omeprazole, potassium chloride, valsartan-hydrochlorothiazide, and [START ON 12/28/2022] tramadol, and the following Facility-Administered Medications: sodium chloride 0.9%.      Objective:  Vitals:    12/21/22 1345   BP: (!) 158/101   Pulse: 85        Physical Exam  Vitals and nursing note reviewed. Chaperone present: Accompanied by her .   Constitutional:       General: She is not in acute distress.     Appearance: Normal appearance. She is not ill-appearing, toxic-appearing or diaphoretic.      Comments: Difficulty going from sitting to standing position   HENT:      Head: Normocephalic and atraumatic.      Nose: Nose normal.      Mouth/Throat:      Mouth: Mucous membranes are moist.   Eyes:      Extraocular Movements: Extraocular movements intact.       Pupils: Pupils are equal, round, and reactive to light.   Cardiovascular:      Rate and Rhythm: Normal rate and regular rhythm.      Heart sounds: Normal heart sounds.   Pulmonary:      Effort: Pulmonary effort is normal. No respiratory distress.      Breath sounds: Normal breath sounds. No stridor. No wheezing or rhonchi.   Abdominal:      General: Bowel sounds are normal.      Palpations: Abdomen is soft.   Musculoskeletal:         General: No swelling or deformity.      Cervical back: Normal and normal range of motion. No spasms or tenderness. No pain with movement. Normal range of motion.      Thoracic back: Normal.      Lumbar back: Spasms, tenderness and bony tenderness present. Decreased range of motion. Negative right straight leg raise test and negative left straight leg raise test. No scoliosis.      Right lower leg: No edema.      Left lower leg: No edema.      Comments: Lumbar pain with flexion, extension lateral rotation.  Bilateral lumbar facet tenderness to palpation from L3-S1.   Skin:     General: Skin is warm.   Neurological:      General: No focal deficit present.      Mental Status: She is alert and oriented to person, place, and time. Mental status is at baseline.      Cranial Nerves: No cranial nerve deficit.      Sensory: Sensation is intact. No sensory deficit.      Motor: No weakness.      Coordination: Coordination normal.      Gait: Gait normal.      Deep Tendon Reflexes: Reflexes are normal and symmetric.   Psychiatric:         Mood and Affect: Mood normal.         Behavior: Behavior normal.         Assessment:      1. Encounter for long-term (current) use of other medications    2. Spondylosis of lumbar region without myelopathy or radiculopathy    3. Osteoarthrosis multiple sites, not specified as generalized    4. Cervical radiculopathy            Plan:  1. reviewed  2.Addiction, Dependency, Tolerance, Opioid abuse-misuse, Death, Diversion Discussed. Overdose reversal drug  Naloxone discussed.  3.Refill/Continue medications for pain control and function       Requested Prescriptions     Signed Prescriptions Disp Refills    traMADoL (ULTRAM) 50 mg tablet 90 tablet 1     Sig: Take 1 tablet (50 mg total) by mouth every 8 (eight) hours as needed for Pain.     4.Urine drug screen point of care obtained and consistent with prescribed medications and medication refill date  5.Indication: The patient has clinical and radiologic findings suggestive of recurrent facet mediated pain.  The patient is s/p bilateral lumbar L3/4, L4/5, and L5/S1 RFA with over 50% improvement of their pain lasting at least 6 months.      Orders Placed This Encounter   Procedures    POCT Urine Drug Screen Presump     Interpretive Information:     Negative:  No drug detected at the cut off level.   Positive:  This result represents presumptive positive for the   tested drug, other substances may yield a positive response other   than the analyte of interest. This result should be utilized for   diagnostic purpose only. Confirmation testing will be performed upon physician request only.       Case Request Operating Room: Bilateral L4-5,5-S1 [facility]     Order Specific Question:   Medical Necessity:     Answer:   Medically Non-Urgent [100]     Order Specific Question:   CPT Code:     Answer:   VT DESTROY LUMB/SAC FACET JNT [93493]     Order Specific Question:   Positioning:     Answer:   Prone [1003]     Order Specific Question:   Post-Procedure Disposition:     Answer:   PACU [1]     Order Specific Question:   Estimated Length of Stay:     Answer:   0 midnight     Order Specific Question:   Implant Required:     Answer:   No [1001]     Order Specific Question:   Is an on-site pathologist required for this procedure?     Answer:   N/A      6.Indications for this procedure for this specific patient include the following   - Pt has had symptoms for three months with moderate to severe pain with functional impairment rated of  7/10 pain.   - Pain non-responsive to conservative care.    - Pain predominately axial and not associated with radiculopathy or claudication.    - No non-facet pathology as source of pain.    - Clinical assessment implicates facet joint as putative pain source.    - Pain is exacerbated by extension or prolonged sitting/standing and relieved by rest.    - No unexplained neurologic deficit.    - No history of coagulopathy, infection or unstable medical conditions.    - Pain is causing significant functional limitation resulting in diminished quality of life and impaired age appropriate ADL's.   - Clinical assessment implicates facet joint as putative source of pain  - Repeat injections not done prior to 7 days   - no more than 2 levels will be done per side      7.Monitored Anesthesia Care medical necessity authorization request:    Monitor anesthesia request is medically indicated for the scheduled nerve block procedure due to:  - needle phobia and anxiety, placing  the patient at risk during the provided service.  -patient has severe problems with muscles and muscle spasticity that makes it hard to lie still  -patient suffers from chronic pain and is unable to function due to  diminished ADLs    .8.The planned medically necessary  surgical procedure is performed in a hospital outpatient department and not in an ambulatory surgical center due to:     -there is no geographically assessable ambulatory surgery center that has the  necessary equipment and fluoroscopy needed for the procedure     -there is no geographically assessable ambulatory surgical center available at which the physician has privileges     -an ASC's  specific  guideline regarding the individuals weight or health conditions that prevent the use of an ASC       report:  Reviewed and consistent with medication use as prescribed.      The total time spent for evaluation and management on 12/21/2022 including reviewing separately obtained history,  performing a medically appropriate exam and evaluation, documenting clinical information in the health record, independently interpreting results and communicating them to the patient/family/caregiver, and ordering medications/tests/procedures was between 15-29 minutes.    The above plan and management options were discussed at length with patient. Patient is in agreement with the above and verbalized understanding. It will be communicated with the referring physician via electronic record, fax, or mail.

## 2023-01-17 ENCOUNTER — HOSPITAL ENCOUNTER (OUTPATIENT)
Facility: HOSPITAL | Age: 53
Discharge: HOME | End: 2023-01-17
Attending: PAIN MEDICINE | Admitting: PAIN MEDICINE
Payer: COMMERCIAL

## 2023-01-17 ENCOUNTER — ANESTHESIA (OUTPATIENT)
Dept: PAIN MEDICINE | Facility: HOSPITAL | Age: 53
End: 2023-01-17
Payer: COMMERCIAL

## 2023-01-17 ENCOUNTER — ANESTHESIA EVENT (OUTPATIENT)
Dept: PAIN MEDICINE | Facility: HOSPITAL | Age: 53
End: 2023-01-17
Payer: COMMERCIAL

## 2023-01-17 VITALS
HEIGHT: 63 IN | BODY MASS INDEX: 32.43 KG/M2 | WEIGHT: 183 LBS | OXYGEN SATURATION: 100 % | DIASTOLIC BLOOD PRESSURE: 83 MMHG | TEMPERATURE: 99 F | SYSTOLIC BLOOD PRESSURE: 146 MMHG | HEART RATE: 74 BPM | RESPIRATION RATE: 14 BRPM

## 2023-01-17 DIAGNOSIS — M47.817 LUMBOSACRAL SPONDYLOSIS WITHOUT MYELOPATHY: ICD-10-CM

## 2023-01-17 PROCEDURE — 64636 PR DESTROY L/S FACET JNT ADDL: ICD-10-PCS | Mod: 50,,, | Performed by: PAIN MEDICINE

## 2023-01-17 PROCEDURE — D9220A PRA ANESTHESIA: ICD-10-PCS | Mod: ,,, | Performed by: NURSE ANESTHETIST, CERTIFIED REGISTERED

## 2023-01-17 PROCEDURE — 64636 DESTROY L/S FACET JNT ADDL: CPT | Mod: 50,,, | Performed by: PAIN MEDICINE

## 2023-01-17 PROCEDURE — 64636 DESTROY L/S FACET JNT ADDL: CPT | Mod: RT | Performed by: PAIN MEDICINE

## 2023-01-17 PROCEDURE — 37000008 HC ANESTHESIA 1ST 15 MINUTES: Performed by: PAIN MEDICINE

## 2023-01-17 PROCEDURE — 27201423 OPTIME MED/SURG SUP & DEVICES STERILE SUPPLY: Performed by: PAIN MEDICINE

## 2023-01-17 PROCEDURE — 64635 DESTROY LUMB/SAC FACET JNT: CPT | Mod: 50,,, | Performed by: PAIN MEDICINE

## 2023-01-17 PROCEDURE — 25000003 PHARM REV CODE 250: Performed by: PAIN MEDICINE

## 2023-01-17 PROCEDURE — D9220A PRA ANESTHESIA: Mod: ,,, | Performed by: NURSE ANESTHETIST, CERTIFIED REGISTERED

## 2023-01-17 PROCEDURE — 63600175 PHARM REV CODE 636 W HCPCS: Performed by: PAIN MEDICINE

## 2023-01-17 PROCEDURE — 63600175 PHARM REV CODE 636 W HCPCS: Performed by: NURSE ANESTHETIST, CERTIFIED REGISTERED

## 2023-01-17 PROCEDURE — 37000009 HC ANESTHESIA EA ADD 15 MINS: Performed by: PAIN MEDICINE

## 2023-01-17 PROCEDURE — 64635 PR DESTROY LUMB/SAC FACET JNT: ICD-10-PCS | Mod: 50,,, | Performed by: PAIN MEDICINE

## 2023-01-17 PROCEDURE — 64635 DESTROY LUMB/SAC FACET JNT: CPT | Mod: LT | Performed by: PAIN MEDICINE

## 2023-01-17 RX ORDER — PROPOFOL 10 MG/ML
INJECTION, EMULSION INTRAVENOUS
Status: DISCONTINUED | OUTPATIENT
Start: 2023-01-17 | End: 2023-01-17

## 2023-01-17 RX ORDER — TRIAMCINOLONE ACETONIDE 40 MG/ML
INJECTION, SUSPENSION INTRA-ARTICULAR; INTRAMUSCULAR CODE/TRAUMA/SEDATION MEDICATION
Status: DISCONTINUED | OUTPATIENT
Start: 2023-01-17 | End: 2023-01-17 | Stop reason: HOSPADM

## 2023-01-17 RX ORDER — PHENYLEPHRINE HYDROCHLORIDE 10 MG/ML
INJECTION INTRAVENOUS
Status: DISCONTINUED | OUTPATIENT
Start: 2023-01-17 | End: 2023-01-17

## 2023-01-17 RX ORDER — FENTANYL CITRATE 50 UG/ML
INJECTION, SOLUTION INTRAMUSCULAR; INTRAVENOUS
Status: DISCONTINUED | OUTPATIENT
Start: 2023-01-17 | End: 2023-01-17

## 2023-01-17 RX ORDER — SODIUM CHLORIDE 9 MG/ML
INJECTION, SOLUTION INTRAVENOUS CONTINUOUS
Status: DISCONTINUED | OUTPATIENT
Start: 2023-01-17 | End: 2023-01-17 | Stop reason: HOSPADM

## 2023-01-17 RX ORDER — BUPIVACAINE HYDROCHLORIDE 2.5 MG/ML
INJECTION, SOLUTION INFILTRATION; PERINEURAL CODE/TRAUMA/SEDATION MEDICATION
Status: DISCONTINUED | OUTPATIENT
Start: 2023-01-17 | End: 2023-01-17 | Stop reason: HOSPADM

## 2023-01-17 RX ADMIN — PROPOFOL 40 MG: 10 INJECTION, EMULSION INTRAVENOUS at 02:01

## 2023-01-17 RX ADMIN — PROPOFOL 60 MG: 10 INJECTION, EMULSION INTRAVENOUS at 02:01

## 2023-01-17 RX ADMIN — PROPOFOL 50 MG: 10 INJECTION, EMULSION INTRAVENOUS at 02:01

## 2023-01-17 RX ADMIN — SODIUM CHLORIDE: 9 INJECTION, SOLUTION INTRAVENOUS at 02:01

## 2023-01-17 RX ADMIN — FENTANYL CITRATE 50 MCG: 50 INJECTION INTRAMUSCULAR; INTRAVENOUS at 02:01

## 2023-01-17 RX ADMIN — PHENYLEPHRINE HYDROCHLORIDE 100 MCG: 10 INJECTION INTRAVENOUS at 02:01

## 2023-01-17 RX ADMIN — PROPOFOL 30 MG: 10 INJECTION, EMULSION INTRAVENOUS at 02:01

## 2023-01-17 NOTE — ANESTHESIA PREPROCEDURE EVALUATION
01/17/2023  Miriam Winter is a 52 y.o., female.      Pre-op Assessment    I have reviewed the Patient Summary Reports.     I have reviewed the Nursing Notes. I have reviewed the NPO Status.   I have reviewed the Medications.     Review of Systems  Anesthesia Hx:  No problems with previous Anesthesia  Denies Family Hx of Anesthesia complications.   Denies Personal Hx of Anesthesia complications.   Social:  Smoker    Cardiovascular:   Exercise tolerance: poor Hypertension hyperlipidemia ECG has been reviewed.    Pulmonary:   Asthma    Hepatic/GI:   GERD    Musculoskeletal:   Arthritis   Musculoskeletal General/Symptoms: low back pain, joint pain.  Denies Lumbar Spine Disorders   Neurological:   Neuromuscular Disease,  Pain Syndrome  Chronic Pain Syndrome   Endocrine:   Diabetes, type 2  Diabetes, Type 2 Diabetes    Psych:  Anxiety Disorder.  Depression.          Physical Exam  General: Alert and Oriented    Airway:  Mallampati: II   Mouth Opening: Normal  TM Distance: Normal  Tongue: Normal  Neck ROM: Normal ROM    Dental:  Intact        Anesthesia Plan  Type of Anesthesia, risks & benefits discussed:    Anesthesia Type: Gen Natural Airway  Intra-op Monitoring Plan: Standard ASA Monitors  Post Op Pain Control Plan: multimodal analgesia  Induction:  IV  Informed Consent: Informed consent signed with the Patient and all parties understand the risks and agree with anesthesia plan.  All questions answered. Patient consented to blood products? Yes  ASA Score: 3  Day of Surgery Review of History & Physical: H&P Update referred to the surgeon/provider.    Ready For Surgery From Anesthesia Perspective.     .

## 2023-01-17 NOTE — PLAN OF CARE
REFER TO WRITTEN DOCUMENT AND RECOVERY INFORMATION.    D/CD PATIENT VIAA WHEELCHAIR AT 1544.    INFORMED PATIENT IF UNABLE TO VOID IN 8 HOURS, GO TO ER. NOTIFY MD OF REDNESS OR DRAINAGE FROM INJECTION SITE OR FEVER OVER 3-4 DAY. REST AND DRINK PLENTY OF FLUIDS FOR THE REMAINDER OF THE DAY. NO LIFTING OVER 5 LBS FOR THE REMAINDER OF THE DAY. CONTINUE REGULAR MEDICATIONS AS PRESCRIBED. MAY TAKE PAIN MEDICATION AS PRESCRIBED.     PAIN IMPROVED  0%

## 2023-01-17 NOTE — OP NOTE
Procedure Note    Procedure Date: 1/17/2023    Procedure Performed:  Radiofrequency ablation of the bilateral  L3-4,4-5,5-S1 medial branch nerves utilizing fluoroscopy    Indications: Patient has failed conservative therapy.      Pre-op diagnosis: Lumbosacral Spondylosis    Post-op diagnosis: same    Physician: FLORES Zambrano MD    Anesthesia:MAC    Medications injected:  Pre-lesion, 2ml of 1% lidocaine at each level.  From a 6 cc mixture of  0.25% bupivacaine and  40mg of kenalog)  1ml of this solution was injected at each level post-lesion.    Local anesthetic used: 1% Lidocaine, 10 ml     Estimated Blood Loss:Less than 1cc    IVF:Per Anesthesia    Complications: None    Technique:  The patient was interviewed in the holding area and Risks/Benefits were discussed, including, but not limited to  the possibility of new or different pain, bleeding or infection.   All questions were answered.  The patient understood and accepted risks.  The area of treatment was marked. Consent was reviewed and signed.  A time out was taken to identify the patient, procedure and side of the procedure. The patient was placed in a prone position, then prepped and draped in the usual sterile fashion using ChloraPrep and sterile towels.  The levels were determined under fluoroscopic guidance.   AP and oblique fluoroscopy were used to identify and nash the junctions between the superior articular processes and transverse processes at  bilateral   L3-4,4-5,5-S1.  The bilateral  sacral ala was also identified and marked.  The skin and subcutaneous tissues in these identified areas were anesthetized with 1% lidocaine. A 20-gauge 100 mm [employer] RF needle was advanced towards each of these points under fluoroscopic guidance such that the tip of the needle was positioned posterior to the Neuro-foramen on lateral fluoroscopic view. Each needle was positioned such that, on stimulation, the patient had an appropriate pain response between 0.3-0.5 V,  with no motor response, other than Lumbar Paraspinal Muscles up to 2.0V.  One mL of 2% lidocaine was then injected at each level prior to lesioning, which was performed for 90 seconds at 80°C.  Once the lesion was complete, 1 mL of a solution consisting of  a 6cc  mixture  (0.25% bupivacaine and 40mg kenalog)  was injected through each probe. The probes were removed and a sterile Band-Aid dressing was applied to the puncture site.  The patient tolerated the procedure well and was monitored after the procedure.  Patient was given post procedure and discharge instructions to follow at home.  The patient was discharged in a stable condition and accompanied by an adult.

## 2023-01-17 NOTE — ANESTHESIA POSTPROCEDURE EVALUATION
Anesthesia Post Evaluation    Patient: Miriam Winter    Procedure(s) Performed: Procedure(s) (LRB):  Bilateral L4-5,5-S1 MB RFTC  BOTH SIDES SAME DAY (Bilateral)    Final Anesthesia Type: general      Patient location during evaluation: PACU  Patient participation: Yes- Able to Participate  Level of consciousness: awake and alert  Post-procedure vital signs: reviewed and stable  Pain management: adequate  Airway patency: patent    PONV status at discharge: No PONV  Anesthetic complications: no      Cardiovascular status: blood pressure returned to baseline and hemodynamically stable  Respiratory status: unassisted  Hydration status: euvolemic  Follow-up not needed.          Vitals Value Taken Time   /105 01/17/23 1520   Temp  01/17/23 1523   Pulse 72 01/17/23 1523   Resp 12 01/17/23 1523   SpO2 100 % 01/17/23 1523   Vitals shown include unvalidated device data.      No case tracking events are documented in the log.      Pain/Farzad Score: Farzad Score: 10 (1/17/2023  3:15 PM)

## 2023-01-17 NOTE — TRANSFER OF CARE
"Anesthesia Transfer of Care Note    Patient: Miriam Winter    Procedure(s) Performed: Procedure(s) (LRB):  Bilateral L4-5,5-S1 MB RFTC  BOTH SIDES SAME DAY (Bilateral)    Patient location: PACU    Anesthesia Type: general    Transport from OR: Transported from OR on room air with adequate spontaneous ventilation    Post pain: adequate analgesia    Post assessment: no apparent anesthetic complications    Post vital signs: stable    Level of consciousness: alert and responds to stimulation    Nausea/Vomiting: no nausea/vomiting    Complications: none    Transfer of care protocol was followed      Last vitals:   Visit Vitals  BP (!) 105/52   Pulse 83   Temp 37 °C (98.6 °F) (Oral)   Resp 14   Ht 5' 3" (1.6 m)   Wt 83 kg (183 lb)   SpO2 99%   BMI 32.42 kg/m²     "

## 2023-01-17 NOTE — BRIEF OP NOTE
Discharge Note  Short Stay    Admit Date: 1/17/2023    Discharge Date: 1/17/2023    Attending Physician: Mabel Zambrano     Discharge Provider: Mabel Zambrano    Diagnoses: Lumbosacral spondylosis    Discharged Condition: Good    Final Diagnoses: Spondylosis of lumbar region without myelopathy or radiculopathy [M47.816]    Disposition: Home or Self Care    Hospital Course: No complications, uneventful    Outcome of Hospitalization, Treatment, Procedure, or Surgery:  Patient was admitted for outpatient interventional pain management procedure. The patient tolerated the procedure well with no complications.    Follow up/Patient Instructions:  Follow up as scheduled in Pain Management office in 3-4 weeks.  Patient has received instructions and follow up date and time.    Medications:  Continue previous medications

## 2023-01-17 NOTE — DISCHARGE SUMMARY
[facility] - Pain Management  Discharge Note  Short Stay    Procedure(s) (LRB):  Bilateral L4-5,5-S1 MB RFTC  BOTH SIDES SAME DAY (Bilateral)      OUTCOME: Patient tolerated treatment/procedure well without complication and is now ready for discharge.    DISPOSITION: Home or Self Care    FINAL DIAGNOSIS:  Lumbosacral spondylosis    FOLLOWUP: In clinic    DISCHARGE INSTRUCTIONS:  See nurse's notes     TIME SPENT ON DISCHARGE: 5 minutes

## 2023-01-30 NOTE — PROGRESS NOTES
Subjective:         Patient ID: Miriam Winter is a 52 y.o. female.    Chief Complaint: Low-back Pain        Pain  This is a chronic problem. The current episode started more than 1 year ago. The problem occurs daily. The problem has been unchanged. Associated symptoms include arthralgias and neck pain. Pertinent negatives include no anorexia, change in bowel habit, chest pain, coughing, diaphoresis, fever, sore throat, vertigo or vomiting.   Review of Systems   Constitutional:  Negative for diaphoresis and fever.   HENT:  Negative for sore throat.    Respiratory:  Negative for cough.    Cardiovascular:  Negative for chest pain.   Gastrointestinal:  Negative for anorexia, change in bowel habit, vomiting and change in bowel habit.   Musculoskeletal:  Positive for arthralgias and neck pain.   Neurological:  Negative for vertigo.         Past Medical History:   Diagnosis Date    Asthma     Degenerative lumbar disc     GERD (gastroesophageal reflux disease)     Hyperlipidemia     Hypertension     Low back pain     Sciatica      Past Surgical History:   Procedure Laterality Date    CHOLECYSTECTOMY      HYSTERECTOMY      INJECTION OF ANESTHETIC AGENT AROUND MEDIAL BRANCH NERVES INNERVATING LUMBAR FACET JOINT Bilateral 9/23/2021    Procedure: Bilateral L4-5,5-S1 MBB;  Surgeon: Mabel Zambrano MD;  Location: [facility];  Service: Pain Management;  Laterality: Bilateral;  PT AWARE TO BE TESTED    INJECTION OF ANESTHETIC AGENT AROUND MEDIAL BRANCH NERVES INNERVATING LUMBAR FACET JOINT Bilateral 10/26/2021    Procedure: Block-nerve-medial branch-lumbar, bilateral L4 through S1;  Surgeon: Mabel Zambrano MD;  Location: [facility];  Service: Pain Management;  Laterality: Bilateral;  PT AWARE ON OV TO BE TESTED    PARTIAL HYSTERECTOMY      RADIOFREQUENCY ABLATION OF LUMBAR MEDIAL BRANCH NERVE AT SINGLE LEVEL Bilateral 12/9/2021    Procedure: Radiofrequency Ablation, Nerve, Spinal, Lumbar, Medial Branch, 1 Level  "L4-S1 Right side 1st pre-cert left side in 2 weeks;  Surgeon: Mabel Zambrano MD;  Location: [facility];  Service: Pain Management;  Laterality: Bilateral;  vaccination card scanned in    RADIOFREQUENCY ABLATION OF LUMBAR MEDIAL BRANCH NERVE AT SINGLE LEVEL Left 12/23/2021    Procedure: RADIOFREQUENCY ABLATION, NERVE, SPINAL, LUMBAR, MEDIAL BRANCH, 1 LEVEL   LEFT L4-S1 RFTC  (pt already had right side);  Surgeon: Mabel Zambrano MD;  Location: [facility];  Service: Pain Management;  Laterality: Left;  VAC  CARD SCANNED IN    RADIOFREQUENCY ABLATION OF LUMBAR MEDIAL BRANCH NERVE AT SINGLE LEVEL Bilateral 1/17/2023    Procedure: Bilateral L4-5,5-S1 MB RFTC  BOTH SIDES SAME DAY;  Surgeon: Mabel Zambrano MD;  Location: [facility];  Service: Pain Management;  Laterality: Bilateral;    TUBAL LIGATION       Social History     Socioeconomic History    Marital status: Legally    Tobacco Use    Smoking status: Never    Smokeless tobacco: Never   Substance and Sexual Activity    Alcohol use: Yes     Comment: occasionally     Family History   Problem Relation Age of Onset    Hypertension Mother     Diabetes Mellitus Mother     Arthritis Mother     Hypertension Father     Diabetes Mellitus Father      Review of patient's allergies indicates:   Allergen Reactions    Pcn [penicillins]         Objective:  Vitals:    01/31/23 1113   BP: (!) 163/92   Pulse: 83   Resp: 18   Weight: 85.3 kg (188 lb)   Height: 5' 3" (1.6 m)   PainSc:   5         Physical Exam  Vitals and nursing note reviewed. Exam conducted with a chaperone present.   Constitutional:       General: She is awake. She is not in acute distress.     Appearance: Normal appearance. She is not ill-appearing, toxic-appearing or diaphoretic.   HENT:      Head: Normocephalic and atraumatic.      Nose: Nose normal.      Mouth/Throat:      Mouth: Mucous membranes are moist.      Pharynx: Oropharynx is clear.   Eyes:      Conjunctiva/sclera: " Conjunctivae normal.      Pupils: Pupils are equal, round, and reactive to light.   Cardiovascular:      Rate and Rhythm: Normal rate.   Pulmonary:      Effort: Pulmonary effort is normal. No respiratory distress.   Abdominal:      Palpations: Abdomen is soft.      Tenderness: There is no guarding.   Musculoskeletal:         General: Normal range of motion.      Cervical back: Normal range of motion and neck supple. Tenderness present. No rigidity.      Thoracic back: Tenderness present.      Lumbar back: Tenderness present.   Skin:     General: Skin is warm and dry.      Coloration: Skin is not jaundiced or pale.   Neurological:      General: No focal deficit present.      Mental Status: She is alert and oriented to person, place, and time. Mental status is at baseline.      Cranial Nerves: No cranial nerve deficit (II-XII).   Psychiatric:         Mood and Affect: Mood normal.         Behavior: Behavior normal. Behavior is cooperative.         Thought Content: Thought content normal.         FL Fluoro for Pain Management  See OP Notes for results.     IMPRESSION: See OP Notes for results.     This procedure was auto-finalized by: Virtual Radiologist         Office Visit on 12/21/2022   Component Date Value Ref Range Status    POC Amphetamines 12/21/2022 Negative  Negative, Inconclusive Final    POC Barbiturates 12/21/2022 Negative  Negative, Inconclusive Final    POC Benzodiazepines 12/21/2022 Negative  Negative, Inconclusive Final    POC Cocaine 12/21/2022 Negative  Negative, Inconclusive Final    POC THC 12/21/2022 Negative  Negative, Inconclusive Final    POC Methadone 12/21/2022 Negative  Negative, Inconclusive Final    POC Methamphetamine 12/21/2022 Negative  Negative, Inconclusive Final    POC Opiates 12/21/2022 Negative  Negative, Inconclusive Final    POC Oxycodone 12/21/2022 Negative  Negative, Inconclusive Final    POC Phencyclidine 12/21/2022 Negative  Negative, Inconclusive Final    POC  Methylenedioxymethamphetamine * 12/21/2022 Negative  Negative, Inconclusive Final    POC Tricyclic Antidepressants 12/21/2022 Negative  Negative, Inconclusive Final    POC Buprenorphine 12/21/2022 Negative   Final     Acceptable 12/21/2022 Yes   Final    POC Temperature (Urine) 12/21/2022 92   Final   Lab Visit on 11/07/2022   Component Date Value Ref Range Status    Total Protein 11/07/2022 6.9  6.4 - 8.2 g/dL Final    Albumin, PE 11/07/2022 4.56  3.5 - 5.2 g/dL Final    Alpha-1-Globulin 11/07/2022 0.1  0.1 - 0.4 g/dL Final    Alpha-2-Globulin 11/07/2022 0.8  0.4 - 1.3 g/dL Final    Beta, PE 11/07/2022 0.7  0.5 - 1.5 g/dL Final    Gamma, PE 11/07/2022 0.7  0.5 - 1.8 g/dL Final    Pathologist Interp, Pro Elect, Blo* 11/07/2022 Normal. No monoclonal bands identified   Final    Folate 11/07/2022 14.9  3.1 - 17.5 ng/mL Final    KATY Screen 11/07/2022 Negative  Negative Final    Syphilis Ab Interpretation 11/07/2022 Non-Reactive  Non-Reactive Final    Free T4 11/07/2022 0.83  0.76 - 1.46 ng/dL Final   Office Visit on 08/03/2022   Component Date Value Ref Range Status    POC Amphetamines 08/03/2022 Negative  Negative, Inconclusive Final-Edited    POC Barbiturates 08/03/2022 Negative  Negative, Inconclusive Final-Edited    POC Benzodiazepines 08/03/2022 Negative  Negative, Inconclusive Final-Edited    POC Cocaine 08/03/2022 Negative  Negative, Inconclusive Final-Edited    POC THC 08/03/2022 Negative  Negative, Inconclusive Final-Edited    POC Methadone 08/03/2022 Negative  Negative, Inconclusive Final-Edited    POC Methamphetamine 08/03/2022 Negative  Negative, Inconclusive Final-Edited    POC Opiates 08/03/2022 Negative  Negative, Inconclusive Corrected    POC Oxycodone 08/03/2022 Negative  Negative, Inconclusive Final-Edited    POC Phencyclidine 08/03/2022 Negative  Negative, Inconclusive Final-Edited    POC Methylenedioxymethamphetamine * 08/03/2022 Negative  Negative, Inconclusive Final-Edited    POC  Tricyclic Antidepressants 2022 Negative  Negative, Inconclusive Final-Edited    POC Buprenorphine 2022 Negative   Final-Edited     Acceptable 2022 Yes   Final-Edited    POC Temperature (Urine) 2022 92   Final-Edited         No orders of the defined types were placed in this encounter.        Requested Prescriptions     Signed Prescriptions Disp Refills    traMADoL (ULTRAM) 50 mg tablet 90 tablet 1     Sig: Take 1 tablet (50 mg total) by mouth every 8 (eight) hours as needed for Pain.    naloxone (NARCAN) 4 mg/actuation Spry 2 each 0     Si sprays (8 mg total) by Nasal route once. Call 911, Two sprays alternate nostril, may repeat 2-3 minutes as needed for 1 dose       Assessment:     1. Lumbosacral spondylosis without myelopathy    2. Osteoarthrosis multiple sites, not specified as generalized    3. Cervical radiculopathy         A's of Opioid Risk Assessment  Activity:Patient can perform ADL.   Analgesia:Patients pain is partially controlled by current medication. Patient has tried OTC medications such as Tylenol and Ibuprofen with out relief.   Adverse Effects: Patient denies constipation or sedation.  Aberrant Behavior:  reviewed with no aberrant drug seeking/taking behavior.  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    Plan:    Narcan 2023    Presumptive drug screen negative, this is expected result, patient takes tramadol, cup does not test for tramadol    History anemia patient is supposed to take iron supplements she states it is difficult to take due to constipation    Follow-up after bilateral lumbar RF TC 2023, patient states she had 50% relief after procedure, procedure did help improve her level function    She would like to continue conservative management for now    Continue current  medication    Continue home exercise program as directed    Follow-up 2 months    Dr. Zambrano, January 2024    Bring original prescription medication bottles/container/box with labels to each visit    Pill count    Physical therapy    Massage therapy declines

## 2023-01-31 ENCOUNTER — OFFICE VISIT (OUTPATIENT)
Dept: PAIN MEDICINE | Facility: CLINIC | Age: 53
End: 2023-01-31
Payer: COMMERCIAL

## 2023-01-31 VITALS
RESPIRATION RATE: 18 BRPM | HEART RATE: 83 BPM | SYSTOLIC BLOOD PRESSURE: 163 MMHG | BODY MASS INDEX: 33.31 KG/M2 | DIASTOLIC BLOOD PRESSURE: 92 MMHG | WEIGHT: 188 LBS | HEIGHT: 63 IN

## 2023-01-31 DIAGNOSIS — M89.49 OSTEOARTHROSIS MULTIPLE SITES, NOT SPECIFIED AS GENERALIZED: Chronic | ICD-10-CM

## 2023-01-31 DIAGNOSIS — M47.817 LUMBOSACRAL SPONDYLOSIS WITHOUT MYELOPATHY: Primary | Chronic | ICD-10-CM

## 2023-01-31 DIAGNOSIS — M54.12 CERVICAL RADICULOPATHY: Chronic | ICD-10-CM

## 2023-01-31 PROCEDURE — 99214 PR OFFICE/OUTPT VISIT, EST, LEVL IV, 30-39 MIN: ICD-10-PCS | Mod: S$PBB,,, | Performed by: PHYSICIAN ASSISTANT

## 2023-01-31 PROCEDURE — 99214 OFFICE O/P EST MOD 30 MIN: CPT | Mod: S$PBB,,, | Performed by: PHYSICIAN ASSISTANT

## 2023-01-31 PROCEDURE — 99215 OFFICE O/P EST HI 40 MIN: CPT | Mod: PBBFAC | Performed by: PHYSICIAN ASSISTANT

## 2023-01-31 RX ORDER — NALOXONE HYDROCHLORIDE 4 MG/.1ML
2 SPRAY NASAL ONCE
Qty: 2 EACH | Refills: 0 | Status: SHIPPED | OUTPATIENT
Start: 2023-01-31 | End: 2023-01-31

## 2023-01-31 RX ORDER — TRAMADOL HYDROCHLORIDE 50 MG/1
50 TABLET ORAL EVERY 8 HOURS PRN
Qty: 90 TABLET | Refills: 1 | Status: SHIPPED | OUTPATIENT
Start: 2023-01-31 | End: 2023-03-29 | Stop reason: SDUPTHER

## 2023-03-29 NOTE — PROGRESS NOTES
Subjective:         Patient ID: Miriam Winter is a 52 y.o. female.    Chief Complaint: Low-back Pain        Pain  This is a chronic problem. The current episode started more than 1 year ago. The problem occurs daily. The problem has been waxing and waning. Associated symptoms include arthralgias and neck pain. Pertinent negatives include no anorexia, change in bowel habit, chest pain, chills, coughing, diaphoresis, fever, rash, sore throat, vertigo or vomiting.   Review of Systems   Constitutional:  Negative for activity change, appetite change, chills, diaphoresis, fever and unexpected weight change.   HENT:  Negative for drooling, ear discharge, ear pain, facial swelling, nosebleeds, sore throat, trouble swallowing, voice change and goiter.    Eyes:  Negative for photophobia, pain, discharge, redness and visual disturbance.   Respiratory:  Negative for apnea, cough, choking, chest tightness, shortness of breath, wheezing and stridor.    Cardiovascular:  Negative for chest pain, palpitations and leg swelling.   Gastrointestinal:  Negative for abdominal distention, anorexia, change in bowel habit, diarrhea, rectal pain, vomiting, fecal incontinence and change in bowel habit.   Endocrine: Negative for cold intolerance, heat intolerance, polydipsia, polyphagia and polyuria.   Genitourinary:  Negative for bladder incontinence, dysuria, flank pain, frequency and hot flashes.   Musculoskeletal:  Positive for arthralgias, back pain, leg pain and neck pain.   Integumentary:  Negative for color change, pallor and rash.   Allergic/Immunologic: Negative for immunocompromised state.   Neurological:  Negative for dizziness, vertigo, seizures, syncope, facial asymmetry, speech difficulty, light-headedness, coordination difficulties, memory loss and coordination difficulties.   Hematological:  Negative for adenopathy. Does not bruise/bleed easily.   Psychiatric/Behavioral:  Negative for agitation, behavioral problems, confusion,  decreased concentration, dysphoric mood, hallucinations, self-injury and suicidal ideas. The patient is not nervous/anxious and is not hyperactive.          Past Medical History:   Diagnosis Date    Asthma     Degenerative lumbar disc     GERD (gastroesophageal reflux disease)     Hyperlipidemia     Hypertension     Low back pain     Sciatica      Past Surgical History:   Procedure Laterality Date    CHOLECYSTECTOMY      HYSTERECTOMY      INJECTION OF ANESTHETIC AGENT AROUND MEDIAL BRANCH NERVES INNERVATING LUMBAR FACET JOINT Bilateral 9/23/2021    Procedure: Bilateral L4-5,5-S1 MBB;  Surgeon: Mabel Zambrano MD;  Location: [facility];  Service: Pain Management;  Laterality: Bilateral;  PT AWARE TO BE TESTED    INJECTION OF ANESTHETIC AGENT AROUND MEDIAL BRANCH NERVES INNERVATING LUMBAR FACET JOINT Bilateral 10/26/2021    Procedure: Block-nerve-medial branch-lumbar, bilateral L4 through S1;  Surgeon: Mabel Zambrano MD;  Location: [facility];  Service: Pain Management;  Laterality: Bilateral;  PT AWARE ON OV TO BE TESTED    PARTIAL HYSTERECTOMY      RADIOFREQUENCY ABLATION OF LUMBAR MEDIAL BRANCH NERVE AT SINGLE LEVEL Bilateral 12/9/2021    Procedure: Radiofrequency Ablation, Nerve, Spinal, Lumbar, Medial Branch, 1 Level L4-S1 Right side 1st pre-cert left side in 2 weeks;  Surgeon: Mabel Zambrano MD;  Location: [facility];  Service: Pain Management;  Laterality: Bilateral;  vaccination card scanned in    RADIOFREQUENCY ABLATION OF LUMBAR MEDIAL BRANCH NERVE AT SINGLE LEVEL Left 12/23/2021    Procedure: RADIOFREQUENCY ABLATION, NERVE, SPINAL, LUMBAR, MEDIAL BRANCH, 1 LEVEL   LEFT L4-S1 RFTC  (pt already had right side);  Surgeon: Mabel Zambrano MD;  Location: [facility];  Service: Pain Management;  Laterality: Left;  VAC  CARD SCANNED IN    RADIOFREQUENCY ABLATION OF LUMBAR MEDIAL BRANCH NERVE AT SINGLE LEVEL Bilateral 1/17/2023    Procedure: Bilateral L4-5,5-S1 MB RFTC  BOTH  "SIDES SAME DAY;  Surgeon: Mabel Zambrano MD;  Location: [facility];  Service: Pain Management;  Laterality: Bilateral;    TUBAL LIGATION       Social History     Socioeconomic History    Marital status: Legally    Tobacco Use    Smoking status: Never    Smokeless tobacco: Never   Substance and Sexual Activity    Alcohol use: Yes     Comment: occasionally     Family History   Problem Relation Age of Onset    Hypertension Mother     Diabetes Mellitus Mother     Arthritis Mother     Hypertension Father     Diabetes Mellitus Father      Review of patient's allergies indicates:   Allergen Reactions    Pcn [penicillins]         Objective:  Vitals:    03/30/23 1123   BP: (!) 150/91   Pulse: 80   Weight: 85.3 kg (188 lb)   Height: 5' 3" (1.6 m)   PainSc:   7         Physical Exam  Vitals and nursing note reviewed. Exam conducted with a chaperone present.   Constitutional:       General: She is awake. She is not in acute distress.     Appearance: Normal appearance. She is not toxic-appearing or diaphoretic.   HENT:      Head: Normocephalic and atraumatic.      Nose: Nose normal.      Mouth/Throat:      Mouth: Mucous membranes are moist.      Pharynx: Oropharynx is clear.   Eyes:      Conjunctiva/sclera: Conjunctivae normal.      Pupils: Pupils are equal, round, and reactive to light.   Cardiovascular:      Rate and Rhythm: Normal rate.   Pulmonary:      Effort: Pulmonary effort is normal. No respiratory distress.   Abdominal:      Palpations: Abdomen is soft.      Tenderness: There is no guarding.   Musculoskeletal:         General: Normal range of motion.      Cervical back: Normal range of motion and neck supple. Tenderness present. No rigidity.      Thoracic back: Tenderness present.      Lumbar back: Tenderness present.   Skin:     General: Skin is warm and dry.      Coloration: Skin is not jaundiced or pale.   Neurological:      General: No focal deficit present.      Mental Status: She is alert and " oriented to person, place, and time. Mental status is at baseline.      Cranial Nerves: No cranial nerve deficit (II-XII).   Psychiatric:         Mood and Affect: Mood normal.         Behavior: Behavior normal. Behavior is cooperative.         Thought Content: Thought content normal.         FL Fluoro for Pain Management  See OP Notes for results.     IMPRESSION: See OP Notes for results.     This procedure was auto-finalized by: Virtual Radiologist         Office Visit on 12/21/2022   Component Date Value Ref Range Status    POC Amphetamines 12/21/2022 Negative  Negative, Inconclusive Final    POC Barbiturates 12/21/2022 Negative  Negative, Inconclusive Final    POC Benzodiazepines 12/21/2022 Negative  Negative, Inconclusive Final    POC Cocaine 12/21/2022 Negative  Negative, Inconclusive Final    POC THC 12/21/2022 Negative  Negative, Inconclusive Final    POC Methadone 12/21/2022 Negative  Negative, Inconclusive Final    POC Methamphetamine 12/21/2022 Negative  Negative, Inconclusive Final    POC Opiates 12/21/2022 Negative  Negative, Inconclusive Final    POC Oxycodone 12/21/2022 Negative  Negative, Inconclusive Final    POC Phencyclidine 12/21/2022 Negative  Negative, Inconclusive Final    POC Methylenedioxymethamphetamine * 12/21/2022 Negative  Negative, Inconclusive Final    POC Tricyclic Antidepressants 12/21/2022 Negative  Negative, Inconclusive Final    POC Buprenorphine 12/21/2022 Negative   Final     Acceptable 12/21/2022 Yes   Final    POC Temperature (Urine) 12/21/2022 92   Final   Lab Visit on 11/07/2022   Component Date Value Ref Range Status    Total Protein 11/07/2022 6.9  6.4 - 8.2 g/dL Final    Albumin, PE 11/07/2022 4.56  3.5 - 5.2 g/dL Final    Alpha-1-Globulin 11/07/2022 0.1  0.1 - 0.4 g/dL Final    Alpha-2-Globulin 11/07/2022 0.8  0.4 - 1.3 g/dL Final    Beta, PE 11/07/2022 0.7  0.5 - 1.5 g/dL Final    Gamma, PE 11/07/2022 0.7  0.5 - 1.8 g/dL Final    Pathologist Interp, Pro  Elect, Blo* 11/07/2022 Normal. No monoclonal bands identified   Final    Folate 11/07/2022 14.9  3.1 - 17.5 ng/mL Final    KATY Screen 11/07/2022 Negative  Negative Final    Syphilis Ab Interpretation 11/07/2022 Non-Reactive  Non-Reactive Final    Free T4 11/07/2022 0.83  0.76 - 1.46 ng/dL Final         Orders Placed This Encounter   Procedures    POCT Urine Drug Screen Presump     Interpretive Information:     Negative:  No drug detected at the cut off level.   Positive:  This result represents presumptive positive for the   tested drug, other substances may yield a positive response other   than the analyte of interest. This result should be utilized for   diagnostic purpose only. Confirmation testing will be performed upon physician request only.            Requested Prescriptions     Signed Prescriptions Disp Refills    traMADoL (ULTRAM) 50 mg tablet 90 tablet 1     Sig: Take 1 tablet (50 mg total) by mouth every 8 (eight) hours as needed for Pain.       Assessment:     1. Lumbosacral spondylosis without myelopathy    2. Osteoarthrosis multiple sites, not specified as generalized    3. Cervical radiculopathy    4. Encounter for long-term (current) use of other medications         A's of Opioid Risk Assessment  Activity:Patient can perform ADL.   Analgesia:Patients pain is partially controlled by current medication. Patient has tried OTC medications such as Tylenol and Ibuprofen with out relief.   Adverse Effects: Patient denies constipation or sedation.  Aberrant Behavior:  reviewed with no aberrant drug seeking/taking behavior.  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        Plan:    Narcan January 2023    Presumptive drug screen negative, this is expected result, patient takes tramadol, cup does not test for tramadol    History anemia patient is supposed to take  iron supplements she states it is difficult to take due to constipation    She had bilateral lumbar RF TC January 17, 2023, patient states she had 50% relief after procedure, procedure did help improve her level function    Having some lower back pain pointing to her sacroiliac area left greater than right     She is considering sacroiliac procedure    Requesting Toradol injection     Toradol 60 mg IM, tolerated well    Continue current medication    Continue home exercise program as directed    Follow-up 2 months    Dr. Zambrano, January 2024    Bring original prescription medication bottles/container/box with labels to each visit

## 2023-03-30 ENCOUNTER — OFFICE VISIT (OUTPATIENT)
Dept: PAIN MEDICINE | Facility: CLINIC | Age: 53
End: 2023-03-30
Payer: COMMERCIAL

## 2023-03-30 VITALS
SYSTOLIC BLOOD PRESSURE: 150 MMHG | HEIGHT: 63 IN | WEIGHT: 188 LBS | BODY MASS INDEX: 33.31 KG/M2 | HEART RATE: 80 BPM | DIASTOLIC BLOOD PRESSURE: 91 MMHG

## 2023-03-30 DIAGNOSIS — M89.49 OSTEOARTHROSIS MULTIPLE SITES, NOT SPECIFIED AS GENERALIZED: Chronic | ICD-10-CM

## 2023-03-30 DIAGNOSIS — M54.12 CERVICAL RADICULOPATHY: Chronic | ICD-10-CM

## 2023-03-30 DIAGNOSIS — Z79.899 ENCOUNTER FOR LONG-TERM (CURRENT) USE OF OTHER MEDICATIONS: ICD-10-CM

## 2023-03-30 DIAGNOSIS — M47.817 LUMBOSACRAL SPONDYLOSIS WITHOUT MYELOPATHY: Primary | Chronic | ICD-10-CM

## 2023-03-30 LAB

## 2023-03-30 PROCEDURE — 80305 DRUG TEST PRSMV DIR OPT OBS: CPT | Mod: PBBFAC | Performed by: PHYSICIAN ASSISTANT

## 2023-03-30 PROCEDURE — 96372 THER/PROPH/DIAG INJ SC/IM: CPT | Mod: PBBFAC | Performed by: PHYSICIAN ASSISTANT

## 2023-03-30 PROCEDURE — 99214 OFFICE O/P EST MOD 30 MIN: CPT | Mod: PBBFAC | Performed by: PHYSICIAN ASSISTANT

## 2023-03-30 PROCEDURE — 99214 PR OFFICE/OUTPT VISIT, EST, LEVL IV, 30-39 MIN: ICD-10-PCS | Mod: S$PBB,25,, | Performed by: PHYSICIAN ASSISTANT

## 2023-03-30 PROCEDURE — 99214 OFFICE O/P EST MOD 30 MIN: CPT | Mod: S$PBB,25,, | Performed by: PHYSICIAN ASSISTANT

## 2023-03-30 RX ORDER — KETOROLAC TROMETHAMINE 30 MG/ML
60 INJECTION, SOLUTION INTRAMUSCULAR; INTRAVENOUS
Status: COMPLETED | OUTPATIENT
Start: 2023-03-30 | End: 2023-03-30

## 2023-03-30 RX ORDER — TRAMADOL HYDROCHLORIDE 50 MG/1
50 TABLET ORAL EVERY 8 HOURS PRN
Qty: 90 TABLET | Refills: 1 | Status: SHIPPED | OUTPATIENT
Start: 2023-04-26 | End: 2023-06-25

## 2023-03-30 RX ADMIN — KETOROLAC TROMETHAMINE 60 MG: 30 INJECTION, SOLUTION INTRAMUSCULAR at 11:03

## 2023-06-20 RX ORDER — TRAMADOL HYDROCHLORIDE 50 MG/1
50 TABLET ORAL EVERY 8 HOURS PRN
Qty: 90 TABLET | Refills: 1 | Status: CANCELLED | OUTPATIENT
Start: 2023-06-20 | End: 2023-08-19

## 2023-06-20 NOTE — PROGRESS NOTES
Subjective:         Patient ID: Miriam Winter is a 52 y.o. female.    Chief Complaint: Low-back Pain and Leg Pain (Down legs at times)        Pain  This is a chronic problem. The current episode started more than 1 year ago. The problem occurs daily. The problem has been unchanged. Associated symptoms include arthralgias and neck pain. Pertinent negatives include no anorexia, change in bowel habit, chest pain, chills, coughing, diaphoresis, fever, sore throat, vertigo or vomiting.   Review of Systems   Constitutional:  Negative for activity change, appetite change, chills, diaphoresis, fever and unexpected weight change.   HENT:  Negative for drooling, ear discharge, ear pain, facial swelling, nosebleeds, sore throat, trouble swallowing, voice change and goiter.    Eyes:  Negative for photophobia, pain, discharge, redness and visual disturbance.   Respiratory:  Negative for apnea, cough, choking, chest tightness, shortness of breath, wheezing and stridor.    Cardiovascular:  Negative for chest pain, palpitations and leg swelling.   Gastrointestinal:  Negative for abdominal distention, anorexia, change in bowel habit, diarrhea, rectal pain, vomiting, fecal incontinence and change in bowel habit.   Endocrine: Negative for cold intolerance, heat intolerance, polydipsia, polyphagia and polyuria.   Genitourinary:  Negative for bladder incontinence, dysuria, flank pain, frequency and hot flashes.   Musculoskeletal:  Positive for arthralgias, back pain, leg pain and neck pain.   Integumentary:  Negative for color change and pallor.   Allergic/Immunologic: Negative for immunocompromised state.   Neurological:  Negative for dizziness, vertigo, seizures, syncope, facial asymmetry, speech difficulty, light-headedness, coordination difficulties, memory loss and coordination difficulties.   Hematological:  Negative for adenopathy. Does not bruise/bleed easily.   Psychiatric/Behavioral:  Negative for agitation, behavioral  problems, confusion, decreased concentration, dysphoric mood, hallucinations, self-injury and suicidal ideas. The patient is not nervous/anxious and is not hyperactive.          Past Medical History:   Diagnosis Date    Asthma     Degenerative lumbar disc     GERD (gastroesophageal reflux disease)     Hyperlipidemia     Hypertension     Low back pain     Sciatica      Past Surgical History:   Procedure Laterality Date    CHOLECYSTECTOMY      HYSTERECTOMY      INJECTION OF ANESTHETIC AGENT AROUND MEDIAL BRANCH NERVES INNERVATING LUMBAR FACET JOINT Bilateral 9/23/2021    Procedure: Bilateral L4-5,5-S1 MBB;  Surgeon: Mabel Zambrano MD;  Location: [facility] PAIN [facility];  Service: Pain Management;  Laterality: Bilateral;  PT AWARE TO BE TESTED    INJECTION OF ANESTHETIC AGENT AROUND MEDIAL BRANCH NERVES INNERVATING LUMBAR FACET JOINT Bilateral 10/26/2021    Procedure: Block-nerve-medial branch-lumbar, bilateral L4 through S1;  Surgeon: Mabel Zambrano MD;  Location: [facility];  Service: Pain Management;  Laterality: Bilateral;  PT AWARE ON OV TO BE TESTED    PARTIAL HYSTERECTOMY      RADIOFREQUENCY ABLATION OF LUMBAR MEDIAL BRANCH NERVE AT SINGLE LEVEL Bilateral 12/9/2021    Procedure: Radiofrequency Ablation, Nerve, Spinal, Lumbar, Medial Branch, 1 Level L4-S1 Right side 1st pre-cert left side in 2 weeks;  Surgeon: Mabel Zambrano MD;  Location: [facility];  Service: Pain Management;  Laterality: Bilateral;  vaccination card scanned in    RADIOFREQUENCY ABLATION OF LUMBAR MEDIAL BRANCH NERVE AT SINGLE LEVEL Left 12/23/2021    Procedure: RADIOFREQUENCY ABLATION, NERVE, SPINAL, LUMBAR, MEDIAL BRANCH, 1 LEVEL   LEFT L4-S1 RFTC  (pt already had right side);  Surgeon: Mabel Zambrano MD;  Location: [facility];  Service: Pain Management;  Laterality: Left;  VAC  CARD SCANNED IN    RADIOFREQUENCY ABLATION OF LUMBAR MEDIAL BRANCH NERVE AT SINGLE LEVEL Bilateral 1/17/2023    Procedure: Bilateral  "L4-5,5-S1 MB RFTC  BOTH SIDES SAME DAY;  Surgeon: Mabel Zambrano MD;  Location: [facility];  Service: Pain Management;  Laterality: Bilateral;    TUBAL LIGATION       Social History     Socioeconomic History    Marital status: Legally    Tobacco Use    Smoking status: Never    Smokeless tobacco: Never   Substance and Sexual Activity    Alcohol use: Yes     Comment: occasionally     Family History   Problem Relation Age of Onset    Hypertension Mother     Diabetes Mellitus Mother     Arthritis Mother     Hypertension Father     Diabetes Mellitus Father      Review of patient's allergies indicates:   Allergen Reactions    Pcn [penicillins]         Objective:  Vitals:    06/21/23 1029 06/21/23 1030   BP: 136/78    Pulse: 74    Resp: 18    Weight: 88.9 kg (196 lb)    Height: 5' 3" (1.6 m)    PainSc:   4   4         Physical Exam  Vitals and nursing note reviewed. Exam conducted with a chaperone present.   Constitutional:       General: She is awake. She is not in acute distress.     Appearance: Normal appearance. She is not toxic-appearing or diaphoretic.   HENT:      Head: Normocephalic and atraumatic.      Nose: Nose normal.      Mouth/Throat:      Mouth: Mucous membranes are moist.      Pharynx: Oropharynx is clear.   Eyes:      Conjunctiva/sclera: Conjunctivae normal.      Pupils: Pupils are equal, round, and reactive to light.   Cardiovascular:      Rate and Rhythm: Normal rate.   Pulmonary:      Effort: Pulmonary effort is normal. No respiratory distress.   Abdominal:      Palpations: Abdomen is soft.      Tenderness: There is no guarding.   Musculoskeletal:         General: Normal range of motion.      Cervical back: Normal range of motion and neck supple. Tenderness present. No rigidity.      Thoracic back: Tenderness present.      Lumbar back: Tenderness present.   Skin:     General: Skin is warm and dry.      Coloration: Skin is not jaundiced or pale.   Neurological:      General: No focal " deficit present.      Mental Status: She is alert and oriented to person, place, and time. Mental status is at baseline.      Cranial Nerves: No cranial nerve deficit (II-XII).   Psychiatric:         Mood and Affect: Mood normal.         Behavior: Behavior normal. Behavior is cooperative.         Thought Content: Thought content normal.         FL Fluoro for Pain Management  See OP Notes for results.     IMPRESSION: See OP Notes for results.     This procedure was auto-finalized by: Virtual Radiologist         Office Visit on 03/30/2023   Component Date Value Ref Range Status    POC Amphetamines 03/30/2023 Negative  Negative, Inconclusive Final    POC Barbiturates 03/30/2023 Negative  Negative, Inconclusive Final    POC Benzodiazepines 03/30/2023 Negative  Negative, Inconclusive Final    POC Cocaine 03/30/2023 Negative  Negative, Inconclusive Final    POC THC 03/30/2023 Negative  Negative, Inconclusive Final    POC Methadone 03/30/2023 Negative  Negative, Inconclusive Final    POC Methamphetamine 03/30/2023 Negative  Negative, Inconclusive Final    POC Opiates 03/30/2023 Negative  Negative, Inconclusive Final    POC Oxycodone 03/30/2023 Negative  Negative, Inconclusive Final    POC Phencyclidine 03/30/2023 Negative  Negative, Inconclusive Final    POC Methylenedioxymethamphetamine * 03/30/2023 Negative  Negative, Inconclusive Final    POC Tricyclic Antidepressants 03/30/2023 Negative  Negative, Inconclusive Final    POC Buprenorphine 03/30/2023 Negative   Final     Acceptable 03/30/2023 Yes   Final    POC Temperature (Urine) 03/30/2023 92   Final         Orders Placed This Encounter   Procedures    POCT Urine Drug Screen Presump     Interpretive Information:     Negative:  No drug detected at the cut off level.   Positive:  This result represents presumptive positive for the   tested drug, other substances may yield a positive response other   than the analyte of interest. This result should be  utilized for   diagnostic purpose only. Confirmation testing will be performed upon physician request only.            Requested Prescriptions     Signed Prescriptions Disp Refills    tramadol-acetaminophen 37.5-325 mg (ULTRACET) 37.5-325 mg Tab 120 tablet 1     Sig: Take 1 tablet by mouth every 6 (six) hours as needed for Pain.       Assessment:     1. Lumbosacral spondylosis without myelopathy    2. Osteoarthrosis multiple sites, not specified as generalized    3. Cervical radiculopathy    4. Encounter for long-term (current) use of other medications         A's of Opioid Risk Assessment  Activity:Patient can perform ADL.   Analgesia:Patients pain is partially controlled by current medication. Patient has tried OTC medications such as Tylenol and Ibuprofen with out relief.   Adverse Effects: Patient denies constipation or sedation.  Aberrant Behavior:  reviewed with no aberrant drug seeking/taking behavior.  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        Plan:    Narcan January 2023    Presumptive drug screen negative, this is expected result, patient takes tramadol, cup does not test for tramadol    History anemia patient is supposed to take iron supplements she states it is difficult to take due to constipation    She had bilateral lumbar RF TC January 17, 2023, patient states she had 50% relief after procedure, procedure did help improve her level function    Again today pointing to her left sacroiliac area complaint of discomfort     Again today we reviewed her MRI lumbar spine August 2022 multiple level degenerative changes    She is considering sacroiliac procedure    Requesting adjustment pain medication current medication no longer controlling her discomfort     She takes gabapentin primary care provider    [employer] primary care provider     Discontinue  tramadol    Trial Ultracet 1 p.o. Q 6 hours    Continue home exercise program as directed    Follow-up 2 months    Dr. Zambrano, January 2024    Bring original prescription medication bottles/container/box with labels to each visit

## 2023-06-21 ENCOUNTER — OFFICE VISIT (OUTPATIENT)
Dept: PAIN MEDICINE | Facility: CLINIC | Age: 53
End: 2023-06-21
Payer: COMMERCIAL

## 2023-06-21 VITALS
SYSTOLIC BLOOD PRESSURE: 136 MMHG | HEART RATE: 74 BPM | HEIGHT: 63 IN | RESPIRATION RATE: 18 BRPM | DIASTOLIC BLOOD PRESSURE: 78 MMHG | WEIGHT: 196 LBS | BODY MASS INDEX: 34.73 KG/M2

## 2023-06-21 DIAGNOSIS — M47.817 LUMBOSACRAL SPONDYLOSIS WITHOUT MYELOPATHY: Primary | Chronic | ICD-10-CM

## 2023-06-21 DIAGNOSIS — M89.49 OSTEOARTHROSIS MULTIPLE SITES, NOT SPECIFIED AS GENERALIZED: Chronic | ICD-10-CM

## 2023-06-21 DIAGNOSIS — Z79.899 ENCOUNTER FOR LONG-TERM (CURRENT) USE OF OTHER MEDICATIONS: ICD-10-CM

## 2023-06-21 DIAGNOSIS — M54.12 CERVICAL RADICULOPATHY: Chronic | ICD-10-CM

## 2023-06-21 LAB

## 2023-06-21 PROCEDURE — 99214 PR OFFICE/OUTPT VISIT, EST, LEVL IV, 30-39 MIN: ICD-10-PCS | Mod: S$PBB,,, | Performed by: PHYSICIAN ASSISTANT

## 2023-06-21 PROCEDURE — 99215 OFFICE O/P EST HI 40 MIN: CPT | Mod: PBBFAC | Performed by: PHYSICIAN ASSISTANT

## 2023-06-21 PROCEDURE — 80305 DRUG TEST PRSMV DIR OPT OBS: CPT | Mod: PBBFAC | Performed by: PHYSICIAN ASSISTANT

## 2023-06-21 PROCEDURE — 99214 OFFICE O/P EST MOD 30 MIN: CPT | Mod: S$PBB,,, | Performed by: PHYSICIAN ASSISTANT

## 2023-06-21 RX ORDER — TRAMADOL HYDROCHLORIDE AND ACETAMINOPHEN 37.5; 325 MG/1; MG/1
1 TABLET, FILM COATED ORAL EVERY 6 HOURS PRN
Qty: 120 TABLET | Refills: 1 | Status: SHIPPED | OUTPATIENT
Start: 2023-06-24 | End: 2023-07-12

## 2023-07-10 ENCOUNTER — TELEPHONE (OUTPATIENT)
Dept: PAIN MEDICINE | Facility: CLINIC | Age: 53
End: 2023-07-10
Payer: COMMERCIAL

## 2023-07-10 NOTE — TELEPHONE ENCOUNTER
----- Message from Radha Adams sent at 7/10/2023 10:21 AM CDT -----  Regarding: pt needs to speak to nurse  Pt states pain meds where changed recently and the new meds are not working either please call pt back at [phone]  AILEEN KNOWLES   07/10/2023   3:37 PM   Attempted to call no answer

## 2023-07-12 ENCOUNTER — OFFICE VISIT (OUTPATIENT)
Dept: PAIN MEDICINE | Facility: CLINIC | Age: 53
End: 2023-07-12
Payer: COMMERCIAL

## 2023-07-12 VITALS
BODY MASS INDEX: 33.66 KG/M2 | DIASTOLIC BLOOD PRESSURE: 83 MMHG | SYSTOLIC BLOOD PRESSURE: 134 MMHG | HEIGHT: 63 IN | WEIGHT: 190 LBS | HEART RATE: 79 BPM | RESPIRATION RATE: 18 BRPM

## 2023-07-12 DIAGNOSIS — M47.817 LUMBOSACRAL SPONDYLOSIS WITHOUT MYELOPATHY: Primary | Chronic | ICD-10-CM

## 2023-07-12 DIAGNOSIS — M54.12 CERVICAL RADICULOPATHY: Chronic | ICD-10-CM

## 2023-07-12 DIAGNOSIS — M89.49 OSTEOARTHROSIS MULTIPLE SITES, NOT SPECIFIED AS GENERALIZED: Chronic | ICD-10-CM

## 2023-07-12 PROCEDURE — 99215 OFFICE O/P EST HI 40 MIN: CPT | Mod: PBBFAC | Performed by: PHYSICIAN ASSISTANT

## 2023-07-12 PROCEDURE — 99214 PR OFFICE/OUTPT VISIT, EST, LEVL IV, 30-39 MIN: ICD-10-PCS | Mod: S$PBB,,, | Performed by: PHYSICIAN ASSISTANT

## 2023-07-12 PROCEDURE — 99214 OFFICE O/P EST MOD 30 MIN: CPT | Mod: S$PBB,,, | Performed by: PHYSICIAN ASSISTANT

## 2023-07-12 RX ORDER — TRAMADOL HYDROCHLORIDE AND ACETAMINOPHEN 37.5; 325 MG/1; MG/1
1 TABLET, FILM COATED ORAL EVERY 6 HOURS PRN
Qty: 120 TABLET | Refills: 1 | Status: CANCELLED | OUTPATIENT
Start: 2023-07-12

## 2023-07-12 RX ORDER — TRAMADOL HYDROCHLORIDE 50 MG/1
50 TABLET ORAL EVERY 6 HOURS PRN
Qty: 120 TABLET | Refills: 1 | Status: SHIPPED | OUTPATIENT
Start: 2023-07-12 | End: 2023-09-07 | Stop reason: SDUPTHER

## 2023-07-12 NOTE — PROGRESS NOTES
Subjective:         Patient ID: Miriam Winter is a 52 y.o. female.    Chief Complaint: Low-back Pain        Pain  This is a chronic problem. The current episode started more than 1 year ago. The problem occurs daily. The problem has been waxing and waning. Associated symptoms include arthralgias and neck pain. Pertinent negatives include no anorexia, change in bowel habit, chest pain, chills, coughing, diaphoresis, fever, sore throat, vertigo or vomiting.   Review of Systems   Constitutional:  Negative for activity change, appetite change, chills, diaphoresis, fever and unexpected weight change.   HENT:  Negative for drooling, ear discharge, ear pain, facial swelling, nosebleeds, sore throat, trouble swallowing, voice change and goiter.    Eyes:  Negative for photophobia, pain, discharge, redness and visual disturbance.   Respiratory:  Negative for apnea, cough, choking, chest tightness, shortness of breath, wheezing and stridor.    Cardiovascular:  Negative for chest pain, palpitations and leg swelling.   Gastrointestinal:  Negative for abdominal distention, anorexia, change in bowel habit, diarrhea, rectal pain, vomiting, fecal incontinence and change in bowel habit.   Endocrine: Negative for cold intolerance, heat intolerance, polydipsia, polyphagia and polyuria.   Genitourinary:  Negative for bladder incontinence, dysuria, flank pain, frequency and hot flashes.   Musculoskeletal:  Positive for arthralgias, back pain, leg pain and neck pain.   Integumentary:  Negative for color change and pallor.   Allergic/Immunologic: Negative for immunocompromised state.   Neurological:  Negative for dizziness, vertigo, seizures, syncope, facial asymmetry, speech difficulty, light-headedness, coordination difficulties, memory loss and coordination difficulties.   Hematological:  Negative for adenopathy. Does not bruise/bleed easily.   Psychiatric/Behavioral:  Negative for agitation, behavioral problems, confusion, decreased  concentration, dysphoric mood, hallucinations, self-injury and suicidal ideas. The patient is not nervous/anxious and is not hyperactive.          Past Medical History:   Diagnosis Date    Asthma     Degenerative lumbar disc     GERD (gastroesophageal reflux disease)     Hyperlipidemia     Hypertension     Low back pain     Sciatica      Past Surgical History:   Procedure Laterality Date    CHOLECYSTECTOMY      HYSTERECTOMY      INJECTION OF ANESTHETIC AGENT AROUND MEDIAL BRANCH NERVES INNERVATING LUMBAR FACET JOINT Bilateral 9/23/2021    Procedure: Bilateral L4-5,5-S1 MBB;  Surgeon: Mabel Zambrano MD;  Location: [facility];  Service: Pain Management;  Laterality: Bilateral;  PT AWARE TO BE TESTED    INJECTION OF ANESTHETIC AGENT AROUND MEDIAL BRANCH NERVES INNERVATING LUMBAR FACET JOINT Bilateral 10/26/2021    Procedure: Block-nerve-medial branch-lumbar, bilateral L4 through S1;  Surgeon: Mabel Zambrano MD;  Location: [facility];  Service: Pain Management;  Laterality: Bilateral;  PT AWARE ON OV TO BE TESTED    PARTIAL HYSTERECTOMY      RADIOFREQUENCY ABLATION OF LUMBAR MEDIAL BRANCH NERVE AT SINGLE LEVEL Bilateral 12/9/2021    Procedure: Radiofrequency Ablation, Nerve, Spinal, Lumbar, Medial Branch, 1 Level L4-S1 Right side 1st pre-cert left side in 2 weeks;  Surgeon: Mabel Zambrano MD;  Location: [facility];  Service: Pain Management;  Laterality: Bilateral;  vaccination card scanned in    RADIOFREQUENCY ABLATION OF LUMBAR MEDIAL BRANCH NERVE AT SINGLE LEVEL Left 12/23/2021    Procedure: RADIOFREQUENCY ABLATION, NERVE, SPINAL, LUMBAR, MEDIAL BRANCH, 1 LEVEL   LEFT L4-S1 RFTC  (pt already had right side);  Surgeon: Mabel Zambrano MD;  Location: [facility];  Service: Pain Management;  Laterality: Left;  VAC  CARD SCANNED IN    RADIOFREQUENCY ABLATION OF LUMBAR MEDIAL BRANCH NERVE AT SINGLE LEVEL Bilateral 1/17/2023    Procedure: Bilateral L4-5,5-S1 MB RFTC  BOTH SIDES SAME  "DAY;  Surgeon: Mabel Zambrano MD;  Location: [facility];  Service: Pain Management;  Laterality: Bilateral;    TUBAL LIGATION       Social History     Socioeconomic History    Marital status: Legally    Tobacco Use    Smoking status: Never    Smokeless tobacco: Never   Substance and Sexual Activity    Alcohol use: Yes     Comment: occasionally     Family History   Problem Relation Age of Onset    Hypertension Mother     Diabetes Mellitus Mother     Arthritis Mother     Hypertension Father     Diabetes Mellitus Father      Review of patient's allergies indicates:   Allergen Reactions    Pcn [penicillins]         Objective:  Vitals:    07/12/23 1058   BP: 134/83   Pulse: 79   Resp: 18   Weight: 86.2 kg (190 lb)   Height: 5' 3" (1.6 m)   PainSc:   7         Physical Exam  Vitals and nursing note reviewed. Exam conducted with a chaperone present.   Constitutional:       General: She is awake. She is not in acute distress.     Appearance: Normal appearance. She is not toxic-appearing or diaphoretic.   HENT:      Head: Normocephalic and atraumatic.      Nose: Nose normal.      Mouth/Throat:      Mouth: Mucous membranes are moist.      Pharynx: Oropharynx is clear.   Eyes:      Conjunctiva/sclera: Conjunctivae normal.      Pupils: Pupils are equal, round, and reactive to light.   Cardiovascular:      Rate and Rhythm: Normal rate.   Pulmonary:      Effort: Pulmonary effort is normal. No respiratory distress.   Abdominal:      Palpations: Abdomen is soft.      Tenderness: There is no guarding.   Musculoskeletal:         General: Normal range of motion.      Cervical back: Normal range of motion and neck supple. Tenderness present. No rigidity.      Thoracic back: Tenderness present.      Lumbar back: Tenderness present.   Skin:     General: Skin is warm and dry.      Coloration: Skin is not jaundiced or pale.   Neurological:      General: No focal deficit present.      Mental Status: She is alert and " oriented to person, place, and time. Mental status is at baseline.      Cranial Nerves: No cranial nerve deficit (II-XII).   Psychiatric:         Mood and Affect: Mood normal.         Behavior: Behavior normal. Behavior is cooperative.         Thought Content: Thought content normal.         FL Fluoro for Pain Management  See OP Notes for results.     IMPRESSION: See OP Notes for results.     This procedure was auto-finalized by: Virtual Radiologist         Office Visit on 06/21/2023   Component Date Value Ref Range Status    POC Amphetamines 06/21/2023 Negative  Negative, Inconclusive Final    POC Barbiturates 06/21/2023 Negative  Negative, Inconclusive Final    POC Benzodiazepines 06/21/2023 Negative  Negative, Inconclusive Final    POC Cocaine 06/21/2023 Negative  Negative, Inconclusive Final    POC THC 06/21/2023 Negative  Negative, Inconclusive Final    POC Methadone 06/21/2023 Negative  Negative, Inconclusive Final    POC Methamphetamine 06/21/2023 Negative  Negative, Inconclusive Final    POC Opiates 06/21/2023 Negative  Negative, Inconclusive Final    POC Oxycodone 06/21/2023 Negative  Negative, Inconclusive Final    POC Phencyclidine 06/21/2023 Negative  Negative, Inconclusive Final    POC Methylenedioxymethamphetamine * 06/21/2023 Negative  Negative, Inconclusive Final    POC Tricyclic Antidepressants 06/21/2023 Negative  Negative, Inconclusive Final    POC Buprenorphine 06/21/2023 Negative   Final     Acceptable 06/21/2023 Yes   Final    POC Temperature (Urine) 06/21/2023 94   Final   Office Visit on 03/30/2023   Component Date Value Ref Range Status    POC Amphetamines 03/30/2023 Negative  Negative, Inconclusive Final    POC Barbiturates 03/30/2023 Negative  Negative, Inconclusive Final    POC Benzodiazepines 03/30/2023 Negative  Negative, Inconclusive Final    POC Cocaine 03/30/2023 Negative  Negative, Inconclusive Final    POC THC 03/30/2023 Negative  Negative, Inconclusive Final    POC  Methadone 03/30/2023 Negative  Negative, Inconclusive Final    POC Methamphetamine 03/30/2023 Negative  Negative, Inconclusive Final    POC Opiates 03/30/2023 Negative  Negative, Inconclusive Final    POC Oxycodone 03/30/2023 Negative  Negative, Inconclusive Final    POC Phencyclidine 03/30/2023 Negative  Negative, Inconclusive Final    POC Methylenedioxymethamphetamine * 03/30/2023 Negative  Negative, Inconclusive Final    POC Tricyclic Antidepressants 03/30/2023 Negative  Negative, Inconclusive Final    POC Buprenorphine 03/30/2023 Negative   Final     Acceptable 03/30/2023 Yes   Final    POC Temperature (Urine) 03/30/2023 92   Final         No orders of the defined types were placed in this encounter.        Requested Prescriptions     Signed Prescriptions Disp Refills    traMADoL (ULTRAM) 50 mg tablet 120 tablet 1     Sig: Take 1 tablet (50 mg total) by mouth every 6 (six) hours as needed for Pain.       Assessment:     1. Lumbosacral spondylosis without myelopathy    2. Osteoarthrosis multiple sites, not specified as generalized    3. Cervical radiculopathy         A's of Opioid Risk Assessment  Activity:Patient can perform ADL.   Analgesia:Patients pain is partially controlled by current medication. Patient has tried OTC medications such as Tylenol and Ibuprofen with out relief.   Adverse Effects: Patient denies constipation or sedation.  Aberrant Behavior:  reviewed with no aberrant drug seeking/taking behavior.  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        Plan:    Narcan January 2023    Presumptive drug screen negative, this is expected result, patient takes tramadol, cup does not test for tramadol    History anemia patient is supposed to take iron supplements she states it is difficult to take due to constipation    MRI lumbar spine August  2022 multiple level degenerative changes    Takes gabapentin primary care provider  [employer] primary care provider     Requesting to discontinue Ultracet resume tramadol    Counted and destroyed 60 tablets Ultracet today    Tramadol 1 p.o. Q 6 hours    Continue home exercise program as directed    Follow-up 2 months    Dr. Zambrano, January 2024    Bring original prescription medication bottles/container/box with labels to each visit

## 2023-09-07 NOTE — PROGRESS NOTES
Subjective:         Patient ID: Miriam Winter is a 52 y.o. female.    Chief Complaint: Low-back Pain        Pain  This is a chronic problem. The current episode started more than 1 year ago. The problem occurs daily. The problem has been unchanged. Associated symptoms include arthralgias and neck pain. Pertinent negatives include no anorexia, change in bowel habit, chest pain, chills, coughing, diaphoresis, fever, sore throat, vertigo or vomiting.     Review of Systems   Constitutional:  Negative for activity change, appetite change, chills, diaphoresis, fever and unexpected weight change.   HENT:  Negative for drooling, ear discharge, ear pain, facial swelling, nosebleeds, sore throat, trouble swallowing, voice change and goiter.    Eyes:  Negative for photophobia, pain, discharge, redness and visual disturbance.   Respiratory:  Negative for apnea, cough, choking, chest tightness, shortness of breath, wheezing and stridor.    Cardiovascular:  Negative for chest pain, palpitations and leg swelling.   Gastrointestinal:  Negative for abdominal distention, anorexia, change in bowel habit, diarrhea, rectal pain, vomiting, fecal incontinence and change in bowel habit.   Endocrine: Negative for cold intolerance, heat intolerance, polydipsia, polyphagia and polyuria.   Genitourinary:  Negative for bladder incontinence, dysuria, flank pain, frequency and hot flashes.   Musculoskeletal:  Positive for arthralgias, back pain, leg pain and neck pain.   Integumentary:  Negative for color change and pallor.   Allergic/Immunologic: Negative for immunocompromised state.   Neurological:  Negative for dizziness, vertigo, seizures, syncope, facial asymmetry, speech difficulty, light-headedness, coordination difficulties, memory loss and coordination difficulties.   Hematological:  Negative for adenopathy. Does not bruise/bleed easily.   Psychiatric/Behavioral:  Negative for agitation, behavioral problems, confusion, decreased  concentration, dysphoric mood, hallucinations, self-injury and suicidal ideas. The patient is not nervous/anxious and is not hyperactive.            Past Medical History:   Diagnosis Date    Asthma     Degenerative lumbar disc     GERD (gastroesophageal reflux disease)     Hyperlipidemia     Hypertension     Low back pain     Sciatica      Past Surgical History:   Procedure Laterality Date    CHOLECYSTECTOMY      HYSTERECTOMY      INJECTION OF ANESTHETIC AGENT AROUND MEDIAL BRANCH NERVES INNERVATING LUMBAR FACET JOINT Bilateral 9/23/2021    Procedure: Bilateral L4-5,5-S1 MBB;  Surgeon: Mabel Zambrano MD;  Location: [facility];  Service: Pain Management;  Laterality: Bilateral;  PT AWARE TO BE TESTED    INJECTION OF ANESTHETIC AGENT AROUND MEDIAL BRANCH NERVES INNERVATING LUMBAR FACET JOINT Bilateral 10/26/2021    Procedure: Block-nerve-medial branch-lumbar, bilateral L4 through S1;  Surgeon: Mabel Zambrano MD;  Location: [facility];  Service: Pain Management;  Laterality: Bilateral;  PT AWARE ON OV TO BE TESTED    PARTIAL HYSTERECTOMY      RADIOFREQUENCY ABLATION OF LUMBAR MEDIAL BRANCH NERVE AT SINGLE LEVEL Bilateral 12/9/2021    Procedure: Radiofrequency Ablation, Nerve, Spinal, Lumbar, Medial Branch, 1 Level L4-S1 Right side 1st pre-cert left side in 2 weeks;  Surgeon: Mabel Zambrano MD;  Location: [facility];  Service: Pain Management;  Laterality: Bilateral;  vaccination card scanned in    RADIOFREQUENCY ABLATION OF LUMBAR MEDIAL BRANCH NERVE AT SINGLE LEVEL Left 12/23/2021    Procedure: RADIOFREQUENCY ABLATION, NERVE, SPINAL, LUMBAR, MEDIAL BRANCH, 1 LEVEL   LEFT L4-S1 RFTC  (pt already had right side);  Surgeon: Mabel Zambrano MD;  Location: [facility];  Service: Pain Management;  Laterality: Left;  VAC  CARD SCANNED IN    RADIOFREQUENCY ABLATION OF LUMBAR MEDIAL BRANCH NERVE AT SINGLE LEVEL Bilateral 1/17/2023    Procedure: Bilateral L4-5,5-S1 MB RFTC  BOTH SIDES SAME  "DAY;  Surgeon: Mabel Zambrano MD;  Location: [facility];  Service: Pain Management;  Laterality: Bilateral;    TUBAL LIGATION       Social History     Socioeconomic History    Marital status: Legally    Tobacco Use    Smoking status: Never    Smokeless tobacco: Never   Substance and Sexual Activity    Alcohol use: Yes     Comment: occasionally     Family History   Problem Relation Age of Onset    Hypertension Mother     Diabetes Mellitus Mother     Arthritis Mother     Hypertension Father     Diabetes Mellitus Father      Review of patient's allergies indicates:   Allergen Reactions    Pcn [penicillins]         Objective:  Vitals:    09/11/23 1007 09/11/23 1009   BP: 124/80    Pulse: 79    Resp: 20    Weight: 81.6 kg (180 lb)    Height: 5' 3" (1.6 m)    PainSc:   5   5         Physical Exam  Vitals and nursing note reviewed. Exam conducted with a chaperone present.   Constitutional:       General: She is awake. She is not in acute distress.     Appearance: Normal appearance. She is not toxic-appearing or diaphoretic.   HENT:      Head: Normocephalic and atraumatic.      Nose: Nose normal.      Mouth/Throat:      Mouth: Mucous membranes are moist.      Pharynx: Oropharynx is clear.   Eyes:      Conjunctiva/sclera: Conjunctivae normal.      Pupils: Pupils are equal, round, and reactive to light.   Cardiovascular:      Rate and Rhythm: Normal rate.   Pulmonary:      Effort: Pulmonary effort is normal. No respiratory distress.   Abdominal:      Palpations: Abdomen is soft.      Tenderness: There is no guarding.   Musculoskeletal:         General: Normal range of motion.      Cervical back: Normal range of motion and neck supple. Tenderness present. No rigidity.      Thoracic back: Tenderness present.      Lumbar back: Tenderness present.   Skin:     General: Skin is warm and dry.      Coloration: Skin is not jaundiced or pale.   Neurological:      General: No focal deficit present.      Mental Status: " She is alert and oriented to person, place, and time. Mental status is at baseline.      Cranial Nerves: No cranial nerve deficit (II-XII).   Psychiatric:         Mood and Affect: Mood normal.         Behavior: Behavior normal. Behavior is cooperative.         Thought Content: Thought content normal.           FL Fluoro for Pain Management  See OP Notes for results.     IMPRESSION: See OP Notes for results.     This procedure was auto-finalized by: Virtual Radiologist         Office Visit on 06/21/2023   Component Date Value Ref Range Status    POC Amphetamines 06/21/2023 Negative  Negative, Inconclusive Final    POC Barbiturates 06/21/2023 Negative  Negative, Inconclusive Final    POC Benzodiazepines 06/21/2023 Negative  Negative, Inconclusive Final    POC Cocaine 06/21/2023 Negative  Negative, Inconclusive Final    POC THC 06/21/2023 Negative  Negative, Inconclusive Final    POC Methadone 06/21/2023 Negative  Negative, Inconclusive Final    POC Methamphetamine 06/21/2023 Negative  Negative, Inconclusive Final    POC Opiates 06/21/2023 Negative  Negative, Inconclusive Final    POC Oxycodone 06/21/2023 Negative  Negative, Inconclusive Final    POC Phencyclidine 06/21/2023 Negative  Negative, Inconclusive Final    POC Methylenedioxymethamphetamine * 06/21/2023 Negative  Negative, Inconclusive Final    POC Tricyclic Antidepressants 06/21/2023 Negative  Negative, Inconclusive Final    POC Buprenorphine 06/21/2023 Negative   Final     Acceptable 06/21/2023 Yes   Final    POC Temperature (Urine) 06/21/2023 94   Final   Office Visit on 03/30/2023   Component Date Value Ref Range Status    POC Amphetamines 03/30/2023 Negative  Negative, Inconclusive Final    POC Barbiturates 03/30/2023 Negative  Negative, Inconclusive Final    POC Benzodiazepines 03/30/2023 Negative  Negative, Inconclusive Final    POC Cocaine 03/30/2023 Negative  Negative, Inconclusive Final    POC THC 03/30/2023 Negative  Negative,  Inconclusive Final    POC Methadone 03/30/2023 Negative  Negative, Inconclusive Final    POC Methamphetamine 03/30/2023 Negative  Negative, Inconclusive Final    POC Opiates 03/30/2023 Negative  Negative, Inconclusive Final    POC Oxycodone 03/30/2023 Negative  Negative, Inconclusive Final    POC Phencyclidine 03/30/2023 Negative  Negative, Inconclusive Final    POC Methylenedioxymethamphetamine * 03/30/2023 Negative  Negative, Inconclusive Final    POC Tricyclic Antidepressants 03/30/2023 Negative  Negative, Inconclusive Final    POC Buprenorphine 03/30/2023 Negative   Final     Acceptable 03/30/2023 Yes   Final    POC Temperature (Urine) 03/30/2023 92   Final         Orders Placed This Encounter   Procedures    POCT Urine Drug Screen Presump     Interpretive Information:     Negative:  No drug detected at the cut off level.   Positive:  This result represents presumptive positive for the   tested drug, other substances may yield a positive response other   than the analyte of interest. This result should be utilized for   diagnostic purpose only. Confirmation testing will be performed upon physician request only.            Requested Prescriptions     Pending Prescriptions Disp Refills    traMADoL (ULTRAM) 50 mg tablet 120 tablet 1     Sig: Take 1 tablet (50 mg total) by mouth every 6 (six) hours as needed for Pain.       Assessment:     1. Lumbosacral spondylosis without myelopathy    2. Osteoarthrosis multiple sites, not specified as generalized    3. Cervical radiculopathy    4. Encounter for long-term (current) use of other medications         A's of Opioid Risk Assessment  Activity:Patient can perform ADL.   Analgesia:Patients pain is partially controlled by current medication. Patient has tried OTC medications such as Tylenol and Ibuprofen with out relief.   Adverse Effects: Patient denies constipation or sedation.  Aberrant Behavior:  reviewed with no aberrant drug seeking/taking  behavior.  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        Plan:    Narcan January 2023    Presumptive drug screen negative, this is expected result, patient takes tramadol, cup does not test for tramadol    No new complaints Current medications helping control her discomfort    History anemia patient is supposed to take iron supplements she states it is difficult to take due to constipation    Takes gabapentin primary care provider  Celebrex primary care provider     No new complaints she states current medications helping with her discomfort    Continue home exercise program as directed    Follow-up 2 months    Dr. Zambrano, January 2024    Bring original prescription medication bottles/container/box with labels to each visit

## 2023-09-11 ENCOUNTER — OFFICE VISIT (OUTPATIENT)
Dept: PAIN MEDICINE | Facility: CLINIC | Age: 53
End: 2023-09-11
Payer: COMMERCIAL

## 2023-09-11 VITALS
WEIGHT: 180 LBS | RESPIRATION RATE: 20 BRPM | SYSTOLIC BLOOD PRESSURE: 124 MMHG | HEART RATE: 79 BPM | BODY MASS INDEX: 31.89 KG/M2 | DIASTOLIC BLOOD PRESSURE: 80 MMHG | HEIGHT: 63 IN

## 2023-09-11 DIAGNOSIS — M47.817 LUMBOSACRAL SPONDYLOSIS WITHOUT MYELOPATHY: Primary | Chronic | ICD-10-CM

## 2023-09-11 DIAGNOSIS — M54.12 CERVICAL RADICULOPATHY: Chronic | ICD-10-CM

## 2023-09-11 DIAGNOSIS — Z79.899 ENCOUNTER FOR LONG-TERM (CURRENT) USE OF OTHER MEDICATIONS: ICD-10-CM

## 2023-09-11 DIAGNOSIS — M89.49 OSTEOARTHROSIS MULTIPLE SITES, NOT SPECIFIED AS GENERALIZED: Chronic | ICD-10-CM

## 2023-09-11 LAB
CTP QC/QA: YES
POC (AMP) AMPHETAMINE: NEGATIVE
POC (BAR) BARBITURATES: NEGATIVE
POC (BUP) BUPRENORPHINE: NEGATIVE
POC (BZO) BENZODIAZEPINES: NEGATIVE
POC (COC) COCAINE: NEGATIVE
POC (MDMA) METHYLENEDIOXYMETHAMPHETAMINE 3,4: NEGATIVE
POC (MET) METHAMPHETAMINE: NEGATIVE
POC (MOP) OPIATES: NEGATIVE
POC (MTD) METHADONE: NEGATIVE
POC (OXY) OXYCODONE: NEGATIVE
POC (PCP) PHENCYCLIDINE: NEGATIVE
POC (TCA) TRICYCLIC ANTIDEPRESSANTS: NEGATIVE
POC TEMPERATURE (URINE): 90
POC THC: NEGATIVE

## 2023-09-11 PROCEDURE — 99214 OFFICE O/P EST MOD 30 MIN: CPT | Mod: S$PBB,,, | Performed by: PHYSICIAN ASSISTANT

## 2023-09-11 PROCEDURE — 99999PBSHW POCT URINE DRUG SCREEN PRESUMP: ICD-10-PCS | Mod: PBBFAC,,,

## 2023-09-11 PROCEDURE — 80305 DRUG TEST PRSMV DIR OPT OBS: CPT | Mod: PBBFAC | Performed by: PHYSICIAN ASSISTANT

## 2023-09-11 PROCEDURE — 99215 OFFICE O/P EST HI 40 MIN: CPT | Mod: PBBFAC | Performed by: PHYSICIAN ASSISTANT

## 2023-09-11 PROCEDURE — 99214 PR OFFICE/OUTPT VISIT, EST, LEVL IV, 30-39 MIN: ICD-10-PCS | Mod: S$PBB,,, | Performed by: PHYSICIAN ASSISTANT

## 2023-09-11 PROCEDURE — 99999PBSHW POCT URINE DRUG SCREEN PRESUMP: Mod: PBBFAC,,,

## 2023-09-11 RX ORDER — TRAMADOL HYDROCHLORIDE 50 MG/1
50 TABLET ORAL EVERY 6 HOURS PRN
Qty: 120 TABLET | Refills: 1 | Status: SHIPPED | OUTPATIENT
Start: 2023-09-13 | End: 2023-10-16 | Stop reason: SDUPTHER

## 2023-10-16 NOTE — H&P (VIEW-ONLY)
Subjective:         Patient ID: Miriam Winter is a 52 y.o. female.    Chief Complaint: Low-back Pain        Pain  This is a chronic problem. The current episode started more than 1 year ago. The problem occurs daily. The problem has been waxing and waning. Associated symptoms include arthralgias and neck pain. Pertinent negatives include no anorexia, change in bowel habit, chest pain, chills, coughing, diaphoresis, fever, sore throat, vertigo or vomiting.     Review of Systems   Constitutional:  Negative for activity change, appetite change, chills, diaphoresis, fever and unexpected weight change.   HENT:  Negative for drooling, ear discharge, ear pain, facial swelling, nosebleeds, sore throat, trouble swallowing, voice change and goiter.    Eyes:  Negative for photophobia, pain, discharge, redness and visual disturbance.   Respiratory:  Negative for apnea, cough, choking, chest tightness, shortness of breath, wheezing and stridor.    Cardiovascular:  Negative for chest pain, palpitations and leg swelling.   Gastrointestinal:  Negative for abdominal distention, anorexia, change in bowel habit, diarrhea, rectal pain, vomiting and fecal incontinence.   Endocrine: Negative for cold intolerance, heat intolerance, polydipsia, polyphagia and polyuria.   Genitourinary:  Negative for bladder incontinence, dysuria, flank pain, frequency and hot flashes.   Musculoskeletal:  Positive for arthralgias, back pain, leg pain and neck pain.   Integumentary:  Negative for color change and pallor.   Allergic/Immunologic: Negative for immunocompromised state.   Neurological:  Negative for dizziness, vertigo, seizures, syncope, facial asymmetry, speech difficulty, light-headedness, memory loss and coordination difficulties.   Hematological:  Negative for adenopathy. Does not bruise/bleed easily.   Psychiatric/Behavioral:  Negative for agitation, behavioral problems, confusion, decreased concentration, dysphoric mood, hallucinations,  self-injury and suicidal ideas. The patient is not nervous/anxious and is not hyperactive.            Past Medical History:   Diagnosis Date    Asthma     Degenerative lumbar disc     GERD (gastroesophageal reflux disease)     Hyperlipidemia     Hypertension     Low back pain     Sciatica      Past Surgical History:   Procedure Laterality Date    CHOLECYSTECTOMY      HYSTERECTOMY      INJECTION OF ANESTHETIC AGENT AROUND MEDIAL BRANCH NERVES INNERVATING LUMBAR FACET JOINT Bilateral 9/23/2021    Procedure: Bilateral L4-5,5-S1 MBB;  Surgeon: Mabel Zambrano MD;  Location: [facility];  Service: Pain Management;  Laterality: Bilateral;  PT AWARE TO BE TESTED    INJECTION OF ANESTHETIC AGENT AROUND MEDIAL BRANCH NERVES INNERVATING LUMBAR FACET JOINT Bilateral 10/26/2021    Procedure: Block-nerve-medial branch-lumbar, bilateral L4 through S1;  Surgeon: Mabel Zambrano MD;  Location: [facility];  Service: Pain Management;  Laterality: Bilateral;  PT AWARE ON OV TO BE TESTED    PARTIAL HYSTERECTOMY      RADIOFREQUENCY ABLATION OF LUMBAR MEDIAL BRANCH NERVE AT SINGLE LEVEL Bilateral 12/9/2021    Procedure: Radiofrequency Ablation, Nerve, Spinal, Lumbar, Medial Branch, 1 Level L4-S1 Right side 1st pre-cert left side in 2 weeks;  Surgeon: Mabel Zambrano MD;  Location: [facility];  Service: Pain Management;  Laterality: Bilateral;  vaccination card scanned in    RADIOFREQUENCY ABLATION OF LUMBAR MEDIAL BRANCH NERVE AT SINGLE LEVEL Left 12/23/2021    Procedure: RADIOFREQUENCY ABLATION, NERVE, SPINAL, LUMBAR, MEDIAL BRANCH, 1 LEVEL   LEFT L4-S1 RFTC  (pt already had right side);  Surgeon: Mabel Zambrano MD;  Location: [facility];  Service: Pain Management;  Laterality: Left;  VAC  CARD SCANNED IN    RADIOFREQUENCY ABLATION OF LUMBAR MEDIAL BRANCH NERVE AT SINGLE LEVEL Bilateral 1/17/2023    Procedure: Bilateral L4-5,5-S1 MB RFTC  BOTH SIDES SAME DAY;  Surgeon: Mabel Zambrano MD;  Location:  [facility];  Service: Pain Management;  Laterality: Bilateral;    TUBAL LIGATION       Social History     Socioeconomic History    Marital status: Legally    Tobacco Use    Smoking status: Never    Smokeless tobacco: Never   Substance and Sexual Activity    Alcohol use: Yes     Comment: occasionally     Family History   Problem Relation Age of Onset    Hypertension Mother     Diabetes Mellitus Mother     Arthritis Mother     Hypertension Father     Diabetes Mellitus Father      Review of patient's allergies indicates:   Allergen Reactions    Pcn [penicillins]         Objective:  Vitals:    10/18/23 1017   BP: (!) 145/89   Pulse: 78   Resp: 18   Weight: 87.1 kg (192 lb)   Height: 5' 3" (1.6 m)   PainSc:   7         Physical Exam  Vitals and nursing note reviewed. Exam conducted with a chaperone present.   Constitutional:       General: She is awake. She is not in acute distress.     Appearance: Normal appearance. She is not toxic-appearing or diaphoretic.   HENT:      Head: Normocephalic and atraumatic.      Nose: Nose normal.      Mouth/Throat:      Mouth: Mucous membranes are moist.      Pharynx: Oropharynx is clear.   Eyes:      Conjunctiva/sclera: Conjunctivae normal.      Pupils: Pupils are equal, round, and reactive to light.   Cardiovascular:      Rate and Rhythm: Normal rate.   Pulmonary:      Effort: Pulmonary effort is normal. No respiratory distress.   Abdominal:      Palpations: Abdomen is soft.      Tenderness: There is no guarding.   Musculoskeletal:         General: Normal range of motion.      Cervical back: Normal range of motion and neck supple. Tenderness present. No rigidity.      Thoracic back: Tenderness present.      Lumbar back: Tenderness present.   Skin:     General: Skin is warm and dry.      Coloration: Skin is not jaundiced or pale.   Neurological:      General: No focal deficit present.      Mental Status: She is alert and oriented to person, place, and time. Mental " status is at baseline.      Cranial Nerves: No cranial nerve deficit (II-XII).   Psychiatric:         Mood and Affect: Mood normal.         Behavior: Behavior normal. Behavior is cooperative.         Thought Content: Thought content normal.           FL Fluoro for Pain Management  See OP Notes for results.     IMPRESSION: See OP Notes for results.     This procedure was auto-finalized by: Virtual Radiologist         Office Visit on 09/11/2023   Component Date Value Ref Range Status    POC Amphetamines 09/11/2023 Negative  Negative, Inconclusive Final    POC Barbiturates 09/11/2023 Negative  Negative, Inconclusive Final    POC Benzodiazepines 09/11/2023 Negative  Negative, Inconclusive Final    POC Cocaine 09/11/2023 Negative  Negative, Inconclusive Final    POC THC 09/11/2023 Negative  Negative, Inconclusive Final    POC Methadone 09/11/2023 Negative  Negative, Inconclusive Final    POC Methamphetamine 09/11/2023 Negative  Negative, Inconclusive Final    POC Opiates 09/11/2023 Negative  Negative, Inconclusive Final    POC Oxycodone 09/11/2023 Negative  Negative, Inconclusive Final    POC Phencyclidine 09/11/2023 Negative  Negative, Inconclusive Final    POC Methylenedioxymethamphetamine * 09/11/2023 Negative  Negative, Inconclusive Final    POC Tricyclic Antidepressants 09/11/2023 Negative  Negative, Inconclusive Final    POC Buprenorphine 09/11/2023 Negative   Final     Acceptable 09/11/2023 Yes   Final    POC Temperature (Urine) 09/11/2023 90   Final   Office Visit on 06/21/2023   Component Date Value Ref Range Status    POC Amphetamines 06/21/2023 Negative  Negative, Inconclusive Final    POC Barbiturates 06/21/2023 Negative  Negative, Inconclusive Final    POC Benzodiazepines 06/21/2023 Negative  Negative, Inconclusive Final    POC Cocaine 06/21/2023 Negative  Negative, Inconclusive Final    POC THC 06/21/2023 Negative  Negative, Inconclusive Final    POC Methadone 06/21/2023 Negative  Negative,  Inconclusive Final    POC Methamphetamine 06/21/2023 Negative  Negative, Inconclusive Final    POC Opiates 06/21/2023 Negative  Negative, Inconclusive Final    POC Oxycodone 06/21/2023 Negative  Negative, Inconclusive Final    POC Phencyclidine 06/21/2023 Negative  Negative, Inconclusive Final    POC Methylenedioxymethamphetamine * 06/21/2023 Negative  Negative, Inconclusive Final    POC Tricyclic Antidepressants 06/21/2023 Negative  Negative, Inconclusive Final    POC Buprenorphine 06/21/2023 Negative   Final     Acceptable 06/21/2023 Yes   Final    POC Temperature (Urine) 06/21/2023 94   Final         No orders of the defined types were placed in this encounter.        Requested Prescriptions     Pending Prescriptions Disp Refills    traMADoL (ULTRAM) 50 mg tablet 120 tablet 1     Sig: Take 1 tablet (50 mg total) by mouth every 6 (six) hours as needed for Pain.       Assessment:     1. Lumbosacral spondylosis without myelopathy    2. Osteoarthrosis multiple sites, not specified as generalized    3. Cervical radiculopathy         A's of Opioid Risk Assessment  Activity:Patient can perform ADL.   Analgesia:Patients pain is partially controlled by current medication. Patient has tried OTC medications such as Tylenol and Ibuprofen with out relief.   Adverse Effects: Patient denies constipation or sedation.  Aberrant Behavior:  reviewed with no aberrant drug seeking/taking behavior.  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        Plan:    Narcan January 2023    Presumptive drug screen negative, this is expected result, patient takes tramadol, cup does not test for tramadol    History anemia patient is supposed to take iron supplements she states it is difficult to take due to constipation    Takes gabapentin primary care provider  Celebrex primary care  provider     Complaint bilateral lower back pain pointing to her sacroiliac area, she is requesting procedure for sacroiliac discomfort    Continue home exercise program as directed    Tender bilateral sacroiliac area  Bilateral sacroiliac compression test positive  Giselle's /James's positive bilateral  Gaenslen positive bilateral  procedure done prior to surgical consideration    Ongoing Physical therapy/home exercise program as directed no longer controlling discomfort    Patient's pain medication no longer helping control discomfort    Schedule bilateral sacroiliac injection # 1, sacroiliitis    Dr. Zambrano    Bring original prescription medication bottles/container/box with labels to each visit

## 2023-10-16 NOTE — PROGRESS NOTES
Subjective:         Patient ID: Miriam Winter is a 52 y.o. female.    Chief Complaint: Low-back Pain        Pain  This is a chronic problem. The current episode started more than 1 year ago. The problem occurs daily. The problem has been waxing and waning. Associated symptoms include arthralgias and neck pain. Pertinent negatives include no anorexia, change in bowel habit, chest pain, chills, coughing, diaphoresis, fever, sore throat, vertigo or vomiting.     Review of Systems   Constitutional:  Negative for activity change, appetite change, chills, diaphoresis, fever and unexpected weight change.   HENT:  Negative for drooling, ear discharge, ear pain, facial swelling, nosebleeds, sore throat, trouble swallowing, voice change and goiter.    Eyes:  Negative for photophobia, pain, discharge, redness and visual disturbance.   Respiratory:  Negative for apnea, cough, choking, chest tightness, shortness of breath, wheezing and stridor.    Cardiovascular:  Negative for chest pain, palpitations and leg swelling.   Gastrointestinal:  Negative for abdominal distention, anorexia, change in bowel habit, diarrhea, rectal pain, vomiting and fecal incontinence.   Endocrine: Negative for cold intolerance, heat intolerance, polydipsia, polyphagia and polyuria.   Genitourinary:  Negative for bladder incontinence, dysuria, flank pain, frequency and hot flashes.   Musculoskeletal:  Positive for arthralgias, back pain, leg pain and neck pain.   Integumentary:  Negative for color change and pallor.   Allergic/Immunologic: Negative for immunocompromised state.   Neurological:  Negative for dizziness, vertigo, seizures, syncope, facial asymmetry, speech difficulty, light-headedness, memory loss and coordination difficulties.   Hematological:  Negative for adenopathy. Does not bruise/bleed easily.   Psychiatric/Behavioral:  Negative for agitation, behavioral problems, confusion, decreased concentration, dysphoric mood, hallucinations,  self-injury and suicidal ideas. The patient is not nervous/anxious and is not hyperactive.            Past Medical History:   Diagnosis Date    Asthma     Degenerative lumbar disc     GERD (gastroesophageal reflux disease)     Hyperlipidemia     Hypertension     Low back pain     Sciatica      Past Surgical History:   Procedure Laterality Date    CHOLECYSTECTOMY      HYSTERECTOMY      INJECTION OF ANESTHETIC AGENT AROUND MEDIAL BRANCH NERVES INNERVATING LUMBAR FACET JOINT Bilateral 9/23/2021    Procedure: Bilateral L4-5,5-S1 MBB;  Surgeon: Mabel Zambrano MD;  Location: [facility];  Service: Pain Management;  Laterality: Bilateral;  PT AWARE TO BE TESTED    INJECTION OF ANESTHETIC AGENT AROUND MEDIAL BRANCH NERVES INNERVATING LUMBAR FACET JOINT Bilateral 10/26/2021    Procedure: Block-nerve-medial branch-lumbar, bilateral L4 through S1;  Surgeon: Mabel Zambrano MD;  Location: [facility];  Service: Pain Management;  Laterality: Bilateral;  PT AWARE ON OV TO BE TESTED    PARTIAL HYSTERECTOMY      RADIOFREQUENCY ABLATION OF LUMBAR MEDIAL BRANCH NERVE AT SINGLE LEVEL Bilateral 12/9/2021    Procedure: Radiofrequency Ablation, Nerve, Spinal, Lumbar, Medial Branch, 1 Level L4-S1 Right side 1st pre-cert left side in 2 weeks;  Surgeon: Mabel Zambrano MD;  Location: [facility];  Service: Pain Management;  Laterality: Bilateral;  vaccination card scanned in    RADIOFREQUENCY ABLATION OF LUMBAR MEDIAL BRANCH NERVE AT SINGLE LEVEL Left 12/23/2021    Procedure: RADIOFREQUENCY ABLATION, NERVE, SPINAL, LUMBAR, MEDIAL BRANCH, 1 LEVEL   LEFT L4-S1 RFTC  (pt already had right side);  Surgeon: Mabel Zambrano MD;  Location: [facility];  Service: Pain Management;  Laterality: Left;  VAC  CARD SCANNED IN    RADIOFREQUENCY ABLATION OF LUMBAR MEDIAL BRANCH NERVE AT SINGLE LEVEL Bilateral 1/17/2023    Procedure: Bilateral L4-5,5-S1 MB RFTC  BOTH SIDES SAME DAY;  Surgeon: Mabel Zambrano MD;  Location:  "[facility];  Service: Pain Management;  Laterality: Bilateral;    TUBAL LIGATION       Social History     Socioeconomic History    Marital status: Legally    Tobacco Use    Smoking status: Never    Smokeless tobacco: Never   Substance and Sexual Activity    Alcohol use: Yes     Comment: occasionally     Family History   Problem Relation Age of Onset    Hypertension Mother     Diabetes Mellitus Mother     Arthritis Mother     Hypertension Father     Diabetes Mellitus Father      Review of patient's allergies indicates:   Allergen Reactions    Pcn [penicillins]         Objective:  Vitals:    10/18/23 1017   BP: (!) 145/89   Pulse: 78   Resp: 18   Weight: 87.1 kg (192 lb)   Height: 5' 3" (1.6 m)   PainSc:   7         Physical Exam  Vitals and nursing note reviewed. Exam conducted with a chaperone present.   Constitutional:       General: She is awake. She is not in acute distress.     Appearance: Normal appearance. She is not toxic-appearing or diaphoretic.   HENT:      Head: Normocephalic and atraumatic.      Nose: Nose normal.      Mouth/Throat:      Mouth: Mucous membranes are moist.      Pharynx: Oropharynx is clear.   Eyes:      Conjunctiva/sclera: Conjunctivae normal.      Pupils: Pupils are equal, round, and reactive to light.   Cardiovascular:      Rate and Rhythm: Normal rate.   Pulmonary:      Effort: Pulmonary effort is normal. No respiratory distress.   Abdominal:      Palpations: Abdomen is soft.      Tenderness: There is no guarding.   Musculoskeletal:         General: Normal range of motion.      Cervical back: Normal range of motion and neck supple. Tenderness present. No rigidity.      Thoracic back: Tenderness present.      Lumbar back: Tenderness present.   Skin:     General: Skin is warm and dry.      Coloration: Skin is not jaundiced or pale.   Neurological:      General: No focal deficit present.      Mental Status: She is alert and oriented to person, place, and time. Mental " status is at baseline.      Cranial Nerves: No cranial nerve deficit (II-XII).   Psychiatric:         Mood and Affect: Mood normal.         Behavior: Behavior normal. Behavior is cooperative.         Thought Content: Thought content normal.           FL Fluoro for Pain Management  See OP Notes for results.     IMPRESSION: See OP Notes for results.     This procedure was auto-finalized by: Virtual Radiologist         Office Visit on 09/11/2023   Component Date Value Ref Range Status    POC Amphetamines 09/11/2023 Negative  Negative, Inconclusive Final    POC Barbiturates 09/11/2023 Negative  Negative, Inconclusive Final    POC Benzodiazepines 09/11/2023 Negative  Negative, Inconclusive Final    POC Cocaine 09/11/2023 Negative  Negative, Inconclusive Final    POC THC 09/11/2023 Negative  Negative, Inconclusive Final    POC Methadone 09/11/2023 Negative  Negative, Inconclusive Final    POC Methamphetamine 09/11/2023 Negative  Negative, Inconclusive Final    POC Opiates 09/11/2023 Negative  Negative, Inconclusive Final    POC Oxycodone 09/11/2023 Negative  Negative, Inconclusive Final    POC Phencyclidine 09/11/2023 Negative  Negative, Inconclusive Final    POC Methylenedioxymethamphetamine * 09/11/2023 Negative  Negative, Inconclusive Final    POC Tricyclic Antidepressants 09/11/2023 Negative  Negative, Inconclusive Final    POC Buprenorphine 09/11/2023 Negative   Final     Acceptable 09/11/2023 Yes   Final    POC Temperature (Urine) 09/11/2023 90   Final   Office Visit on 06/21/2023   Component Date Value Ref Range Status    POC Amphetamines 06/21/2023 Negative  Negative, Inconclusive Final    POC Barbiturates 06/21/2023 Negative  Negative, Inconclusive Final    POC Benzodiazepines 06/21/2023 Negative  Negative, Inconclusive Final    POC Cocaine 06/21/2023 Negative  Negative, Inconclusive Final    POC THC 06/21/2023 Negative  Negative, Inconclusive Final    POC Methadone 06/21/2023 Negative  Negative,  Inconclusive Final    POC Methamphetamine 06/21/2023 Negative  Negative, Inconclusive Final    POC Opiates 06/21/2023 Negative  Negative, Inconclusive Final    POC Oxycodone 06/21/2023 Negative  Negative, Inconclusive Final    POC Phencyclidine 06/21/2023 Negative  Negative, Inconclusive Final    POC Methylenedioxymethamphetamine * 06/21/2023 Negative  Negative, Inconclusive Final    POC Tricyclic Antidepressants 06/21/2023 Negative  Negative, Inconclusive Final    POC Buprenorphine 06/21/2023 Negative   Final     Acceptable 06/21/2023 Yes   Final    POC Temperature (Urine) 06/21/2023 94   Final         No orders of the defined types were placed in this encounter.        Requested Prescriptions     Pending Prescriptions Disp Refills    traMADoL (ULTRAM) 50 mg tablet 120 tablet 1     Sig: Take 1 tablet (50 mg total) by mouth every 6 (six) hours as needed for Pain.       Assessment:     1. Lumbosacral spondylosis without myelopathy    2. Osteoarthrosis multiple sites, not specified as generalized    3. Cervical radiculopathy         A's of Opioid Risk Assessment  Activity:Patient can perform ADL.   Analgesia:Patients pain is partially controlled by current medication. Patient has tried OTC medications such as Tylenol and Ibuprofen with out relief.   Adverse Effects: Patient denies constipation or sedation.  Aberrant Behavior:  reviewed with no aberrant drug seeking/taking behavior.  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        Plan:    Narcan January 2023    Presumptive drug screen negative, this is expected result, patient takes tramadol, cup does not test for tramadol    History anemia patient is supposed to take iron supplements she states it is difficult to take due to constipation    Takes gabapentin primary care provider  [employer] primary care  provider     Complaint bilateral lower back pain pointing to her sacroiliac area, she is requesting procedure for sacroiliac discomfort    Continue home exercise program as directed    Tender bilateral sacroiliac area  Bilateral sacroiliac compression test positive  Giselle's /James's positive bilateral  Gaenslen positive bilateral  procedure done prior to surgical consideration    Ongoing Physical therapy/home exercise program as directed no longer controlling discomfort    Patient's pain medication no longer helping control discomfort    Schedule bilateral sacroiliac injection # 1, sacroiliitis    Dr. Zambrano    Bring original prescription medication bottles/container/box with labels to each visit

## 2023-10-18 ENCOUNTER — OFFICE VISIT (OUTPATIENT)
Dept: PAIN MEDICINE | Facility: CLINIC | Age: 53
End: 2023-10-18
Payer: COMMERCIAL

## 2023-10-18 VITALS
WEIGHT: 192 LBS | HEART RATE: 78 BPM | HEIGHT: 63 IN | SYSTOLIC BLOOD PRESSURE: 145 MMHG | DIASTOLIC BLOOD PRESSURE: 89 MMHG | RESPIRATION RATE: 18 BRPM | BODY MASS INDEX: 34.02 KG/M2

## 2023-10-18 DIAGNOSIS — M46.1 SACROILIITIS: Primary | Chronic | ICD-10-CM

## 2023-10-18 DIAGNOSIS — M89.49 OSTEOARTHROSIS MULTIPLE SITES, NOT SPECIFIED AS GENERALIZED: Chronic | ICD-10-CM

## 2023-10-18 DIAGNOSIS — M54.12 CERVICAL RADICULOPATHY: Chronic | ICD-10-CM

## 2023-10-18 DIAGNOSIS — M47.817 LUMBOSACRAL SPONDYLOSIS WITHOUT MYELOPATHY: Chronic | ICD-10-CM

## 2023-10-18 PROCEDURE — 99215 OFFICE O/P EST HI 40 MIN: CPT | Mod: PBBFAC | Performed by: PHYSICIAN ASSISTANT

## 2023-10-18 PROCEDURE — 99214 PR OFFICE/OUTPT VISIT, EST, LEVL IV, 30-39 MIN: ICD-10-PCS | Mod: S$PBB,,, | Performed by: PHYSICIAN ASSISTANT

## 2023-10-18 PROCEDURE — 99214 OFFICE O/P EST MOD 30 MIN: CPT | Mod: S$PBB,,, | Performed by: PHYSICIAN ASSISTANT

## 2023-10-18 RX ORDER — TRAMADOL HYDROCHLORIDE 50 MG/1
50 TABLET ORAL EVERY 6 HOURS PRN
Qty: 120 TABLET | Refills: 0 | Status: SHIPPED | OUTPATIENT
Start: 2023-11-13 | End: 2023-12-13 | Stop reason: SDUPTHER

## 2023-10-18 NOTE — PATIENT INSTRUCTIONS

## 2023-10-18 NOTE — LETTER
October 18, 2023      [facility]  [address] [place] [id number]  Phone: [phone]       Patient: Miriam Winter   YOB: 1970  Date of Visit: 10/18/2023    To Whom It May Concern:    Denise Winter  was at [facility] on 10/18/2023. The patient may return to work/school on 10/19/23 with no restrictions. If you have any questions or concerns, or if I can be of further assistance, please do not hesitate to contact me.    Sincerely,    Helen Mcknight LPN

## 2023-11-02 ENCOUNTER — ANESTHESIA EVENT (OUTPATIENT)
Dept: PAIN MEDICINE | Facility: HOSPITAL | Age: 53
End: 2023-11-02
Payer: COMMERCIAL

## 2023-11-02 ENCOUNTER — ANESTHESIA (OUTPATIENT)
Dept: PAIN MEDICINE | Facility: HOSPITAL | Age: 53
End: 2023-11-02
Payer: COMMERCIAL

## 2023-11-02 ENCOUNTER — HOSPITAL ENCOUNTER (OUTPATIENT)
Facility: HOSPITAL | Age: 53
Discharge: HOME | End: 2023-11-02
Attending: PAIN MEDICINE | Admitting: PAIN MEDICINE
Payer: COMMERCIAL

## 2023-11-02 VITALS
WEIGHT: 191 LBS | DIASTOLIC BLOOD PRESSURE: 91 MMHG | HEART RATE: 82 BPM | HEIGHT: 63 IN | SYSTOLIC BLOOD PRESSURE: 153 MMHG | TEMPERATURE: 99 F | OXYGEN SATURATION: 99 % | BODY MASS INDEX: 33.84 KG/M2 | RESPIRATION RATE: 17 BRPM

## 2023-11-02 DIAGNOSIS — M46.1 BILATERAL SACROILIITIS: ICD-10-CM

## 2023-11-02 PROCEDURE — 27096 PR INJECTION,SACROILIAC JOINT: ICD-10-PCS | Mod: 50,,, | Performed by: PAIN MEDICINE

## 2023-11-02 PROCEDURE — 63600175 PHARM REV CODE 636 W HCPCS: Performed by: NURSE ANESTHETIST, CERTIFIED REGISTERED

## 2023-11-02 PROCEDURE — D9220A PRA ANESTHESIA: ICD-10-PCS | Mod: ,,, | Performed by: NURSE ANESTHETIST, CERTIFIED REGISTERED

## 2023-11-02 PROCEDURE — 37000008 HC ANESTHESIA 1ST 15 MINUTES: Performed by: PAIN MEDICINE

## 2023-11-02 PROCEDURE — 25000003 PHARM REV CODE 250: Performed by: NURSE ANESTHETIST, CERTIFIED REGISTERED

## 2023-11-02 PROCEDURE — 64451 NJX AA&/STRD NRV NRVTG SI JT: CPT | Mod: 50 | Performed by: PAIN MEDICINE

## 2023-11-02 PROCEDURE — D9220A PRA ANESTHESIA: Mod: ,,, | Performed by: NURSE ANESTHETIST, CERTIFIED REGISTERED

## 2023-11-02 PROCEDURE — 63600175 PHARM REV CODE 636 W HCPCS: Performed by: PAIN MEDICINE

## 2023-11-02 PROCEDURE — 27096 INJECT SACROILIAC JOINT: CPT | Mod: 50,,, | Performed by: PAIN MEDICINE

## 2023-11-02 RX ORDER — TRIAMCINOLONE ACETONIDE 40 MG/ML
INJECTION, SUSPENSION INTRA-ARTICULAR; INTRAMUSCULAR CODE/TRAUMA/SEDATION MEDICATION
Status: DISCONTINUED | OUTPATIENT
Start: 2023-11-02 | End: 2023-11-02 | Stop reason: HOSPADM

## 2023-11-02 RX ORDER — LIDOCAINE HYDROCHLORIDE 20 MG/ML
INJECTION INTRAVENOUS
Status: DISCONTINUED | OUTPATIENT
Start: 2023-11-02 | End: 2023-11-02

## 2023-11-02 RX ORDER — PROPOFOL 10 MG/ML
VIAL (ML) INTRAVENOUS
Status: DISCONTINUED | OUTPATIENT
Start: 2023-11-02 | End: 2023-11-02

## 2023-11-02 RX ORDER — SODIUM CHLORIDE 9 MG/ML
INJECTION, SOLUTION INTRAVENOUS CONTINUOUS
Status: DISCONTINUED | OUTPATIENT
Start: 2023-11-02 | End: 2023-11-02 | Stop reason: HOSPADM

## 2023-11-02 RX ORDER — BUPIVACAINE HYDROCHLORIDE 2.5 MG/ML
INJECTION, SOLUTION EPIDURAL; INFILTRATION; INTRACAUDAL CODE/TRAUMA/SEDATION MEDICATION
Status: DISCONTINUED | OUTPATIENT
Start: 2023-11-02 | End: 2023-11-02 | Stop reason: HOSPADM

## 2023-11-02 RX ADMIN — PROPOFOL 50 MG: 10 INJECTION, EMULSION INTRAVENOUS at 12:11

## 2023-11-02 RX ADMIN — LIDOCAINE HYDROCHLORIDE 100 MG: 20 INJECTION, SOLUTION INTRAVENOUS at 12:11

## 2023-11-02 NOTE — ANESTHESIA RELEASE NOTE
"Anesthesia Release from PACU Note    Patient: Miriam Winter    Procedure(s) Performed: Procedure(s) (LRB):  BLOCK, SACROILIAC JOINT (Bilateral)    Anesthesia type: general    Post pain: Adequate analgesia    Post assessment: no apparent anesthetic complications    Last Vitals:   Visit Vitals  /75 (BP Location: Right arm, Patient Position: Lying)   Pulse 86   Temp 37 °C (98.6 °F) (Oral)   Resp 18   Ht 5' 3" (1.6 m)   Wt 86.6 kg (191 lb)   SpO2 100%   BMI 33.83 kg/m²       Post vital signs: stable    Level of consciousness: awake    Nausea/Vomiting: no nausea/no vomiting    Complications: none    Airway Patency: patent    Respiratory: unassisted    Cardiovascular: stable and blood pressure at baseline    Hydration: euvolemic  "

## 2023-11-02 NOTE — BRIEF OP NOTE
The  Discharge Note  Short Stay    Admit Date: 11/2/2023    Discharge Date: 11/2/2023    Attending Physician: Mabel Zambrano     Discharge Provider: Mabel Zambrano    Diagnosis:  Bilateral sacroiliitis    Procedure performed:  Bilateral SI joint injections under fluoroscopic guided needle localization    Findings: Procedure tolerated well and without complications. Consistent with diagnosis.    EBL: 0cc    Specimens: None    Discharged Condition: Good    Final Diagnoses: Sacroiliitis [M46.1]    Disposition: Home or Self Care    Hospital Course: No complications, uneventful    Outcome of Hospitalization, Treatment, Procedure, or Surgery:  Patient was admitted for outpatient interventional pain management procedure. The patient tolerated the procedure well with no complications.    Follow up/Patient Instructions:  Follow up as scheduled in Pain Management office in 3-4 weeks.  Patient has received instructions and follow up date and time.    Medications:  Continue previous medications

## 2023-11-02 NOTE — TRANSFER OF CARE
"Anesthesia Transfer of Care Note    Patient: Miriam Winter    Procedure(s) Performed: Procedure(s) (LRB):  BLOCK, SACROILIAC JOINT (Bilateral)    Patient location: Other: [facility]    Anesthesia Type: general    Transport from OR: Transported from OR on room air with adequate spontaneous ventilation    Post pain: adequate analgesia    Post assessment: no apparent anesthetic complications    Post vital signs: stable    Level of consciousness: sedated and responds to stimulation    Nausea/Vomiting: no nausea/vomiting    Complications: none    Transfer of care protocol was followedComments: Good SV continue, NAD noted, VSS, RTRN      Last vitals:   Visit Vitals  /75 (BP Location: Right arm, Patient Position: Lying)   Pulse 86   Temp 37 °C (98.6 °F) (Oral)   Resp 18   Ht 5' 3" (1.6 m)   Wt 86.6 kg (191 lb)   SpO2 100%   BMI 33.83 kg/m²     "

## 2023-11-02 NOTE — OP NOTE
Procedure Note    Procedure Date: 11/2/2023    Procedure Performed: Bilateral Sacroiliac joint injection, with Fluoroscopic Guidance    Indications: Patient has failed conservative therapy.      Pre-op diagnosis:  Bilateral Sacroiliitis    Post-op diagnosis: same    Physician: FLORES Zambrano MD    Anesthesia: MAC    Medications injected: 0.25% Bupivacaine, Kenalog 40mg    Local anesthetic used: 1% Lidocaine, 2ml    Estimated Blood Loss: None    Complications:None    Technique:  The patient was interviewed in the holding area and Risks/Benefits were discussed, including, but not limited to, the possibility of new or different pain, bleeding or infection.   All questions were answered.  The patient understood and accepted risks.  Consent was reviewed and signed.   A time out was taken to identify the patient, procedure and side of the procedure.  The skin was prepped with chloroprep and sterile drapes were applied. The bilateral SI joint was identified by fluoroscopy in an oblique plane. Skin and subcutaneous tissues overyling the target area was anesthetized with 1-2 mL of Lidocaine 1%.  A 22g 3 1/2 inch spinal needle was advanced under intermittent fluoroscopy until joint space was entered at the junction of the superior 2/3 & inferior 1/3 of the joint.  There was no paresthesia with needle placement. Aspiration was negative for blood. Next, a 2 cc solution from a mixture of 40 mg kenalog and 3mL of 0.25% Marcaine was injected without difficulty or resistance into each joint. The needle was removed and a sterile Band-Aid dressing was applied to the puncture site. The patient tolerated the procedure well.  The patient was monitored after the procedure.  The patient was given post procedure and discharge instructions to follow at home. The patient was discharged in a stable condition

## 2023-11-02 NOTE — DISCHARGE SUMMARY
[facility]  Discharge Note  Short Stay    Procedure(s) (LRB):  BLOCK, SACROILIAC JOINT (Bilateral)      OUTCOME: Patient tolerated treatment/procedure well without complication and is now ready for discharge.    DISPOSITION: Home or Self Care    FINAL DIAGNOSIS:  Bilateral sacroiliitis    FOLLOWUP: In clinic    DISCHARGE INSTRUCTIONS:  Bilateral SI joint injections under fluoroscopic guided needle localization     TIME SPENT ON DISCHARGE: 5 minutes

## 2023-11-02 NOTE — ANESTHESIA PREPROCEDURE EVALUATION
11/02/2023  Miriam Winter is a 52 y.o., female.      Pre-op Assessment    I have reviewed the Patient Summary Reports.     I have reviewed the Nursing Notes. I have reviewed the NPO Status.   I have reviewed the Medications.     Review of Systems  Cardiovascular:   Hypertension    Pulmonary:   Asthma    Hepatic/GI:   GERD    Musculoskeletal:   Arthritis   Spine Disorders: lumbar Chronic Pain    Neurological:   Neuromuscular Disease,        Physical Exam  General: Well nourished, Cooperative, Alert and Oriented    Airway:  Mallampati: II   Mouth Opening: Normal  TM Distance: Normal  Tongue: Normal  Neck ROM: Normal ROM        Anesthesia Plan  Type of Anesthesia, risks & benefits discussed:    Anesthesia Type: Gen Natural Airway  Intra-op Monitoring Plan: Standard ASA Monitors  Post Op Pain Control Plan: multimodal analgesia  Induction:  IV  Airway Plan: , Awake  Informed Consent: Informed consent signed with the Patient and all parties understand the risks and agree with anesthesia plan.  All questions answered. Patient consented to blood products? Yes  ASA Score: 3  Day of Surgery Review of History & Physical: H&P Update referred to the surgeon/provider.    Ready For Surgery From Anesthesia Perspective.     .

## 2023-11-02 NOTE — ANESTHESIA POSTPROCEDURE EVALUATION
Anesthesia Post Evaluation    Patient: Miriam Winter    Procedure(s) Performed: Procedure(s) (LRB):  BLOCK, SACROILIAC JOINT (Bilateral)    Final Anesthesia Type: general      Patient location: [facility].  Patient participation: Yes- Able to Participate  Level of consciousness: awake and alert  Post-procedure vital signs: reviewed and stable  Pain management: adequate  Airway patency: patent    PONV status at discharge: No PONV  Anesthetic complications: no      Cardiovascular status: blood pressure returned to baseline, hemodynamically stable and stable  Respiratory status: unassisted  Hydration status: euvolemic  Follow-up not needed.  Comments: Pt voices appreciation for care          Vitals Value Taken Time     No case tracking events are documented in the log.      Pain/Farzad Score: No data recorded

## 2023-11-02 NOTE — PLAN OF CARE
REFER TO WRITTEN DOCUMENT AND RECOVERY INFORMATION.    D/CD PATIENT [facility]AA WHEELCHAIR AT 1311.    INFORMED PATIENT IF UNABLE TO VOID IN 8 HOURS, GO TO ER. NOTIFY MD OF REDNESS OR DRAINAGE FROM INJECTION SITE OR FEVER OVER 3-4 DAY. REST AND DRINK PLENTY OF FLUIDS FOR THE REMAINDER OF THE DAY. NO LIFTING OVER 5 LBS FOR THE REMAINDER OF THE DAY. CONTINUE REGULAR MEDICATIONS AS PRESCRIBED. MAY TAKE PAIN MEDICATION AS PRESCRIBED.     PAIN IMPROVED  100%     Preop pain 8.    Postop pain 0.

## 2023-12-13 NOTE — PROGRESS NOTES
Subjective:         Patient ID: Miriam Winter is a 53 y.o. female.    Chief Complaint: Low-back Pain        Pain  This is a chronic problem. The current episode started more than 1 year ago. The problem occurs daily. The problem has been waxing and waning. Associated symptoms include arthralgias and neck pain. Pertinent negatives include no anorexia, change in bowel habit, chest pain, chills, coughing, diaphoresis, fever, sore throat, vertigo or vomiting.     Review of Systems   Constitutional:  Negative for activity change, appetite change, chills, diaphoresis, fever and unexpected weight change.   HENT:  Negative for drooling, ear discharge, ear pain, facial swelling, nosebleeds, sore throat, trouble swallowing, voice change and goiter.    Eyes:  Negative for photophobia, pain, discharge, redness and visual disturbance.   Respiratory:  Negative for apnea, cough, choking, chest tightness, shortness of breath, wheezing and stridor.    Cardiovascular:  Negative for chest pain, palpitations and leg swelling.   Gastrointestinal:  Negative for abdominal distention, anorexia, change in bowel habit, diarrhea, rectal pain, vomiting and fecal incontinence.   Endocrine: Negative for cold intolerance, heat intolerance, polydipsia, polyphagia and polyuria.   Genitourinary:  Negative for bladder incontinence, dysuria, flank pain, frequency and hot flashes.   Musculoskeletal:  Positive for arthralgias, back pain, leg pain and neck pain.   Integumentary:  Negative for color change and pallor.   Allergic/Immunologic: Negative for immunocompromised state.   Neurological:  Negative for dizziness, vertigo, seizures, syncope, facial asymmetry, speech difficulty, light-headedness, memory loss and coordination difficulties.   Hematological:  Negative for adenopathy. Does not bruise/bleed easily.   Psychiatric/Behavioral:  Negative for agitation, behavioral problems, confusion, decreased concentration, dysphoric mood, hallucinations,  self-injury and suicidal ideas. The patient is not nervous/anxious and is not hyperactive.            Past Medical History:   Diagnosis Date    Asthma     Degenerative lumbar disc     GERD (gastroesophageal reflux disease)     Hyperlipidemia     Hypertension     Low back pain     Sciatica      Past Surgical History:   Procedure Laterality Date    CHOLECYSTECTOMY      HYSTERECTOMY      INJECTION OF ANESTHETIC AGENT AROUND MEDIAL BRANCH NERVES INNERVATING LUMBAR FACET JOINT Bilateral 9/23/2021    Procedure: Bilateral L4-5,5-S1 MBB;  Surgeon: Mabel Zambrano MD;  Location: [facility];  Service: Pain Management;  Laterality: Bilateral;  PT AWARE TO BE TESTED    INJECTION OF ANESTHETIC AGENT AROUND MEDIAL BRANCH NERVES INNERVATING LUMBAR FACET JOINT Bilateral 10/26/2021    Procedure: Block-nerve-medial branch-lumbar, bilateral L4 through S1;  Surgeon: Mabel Zambrano MD;  Location: [facility];  Service: Pain Management;  Laterality: Bilateral;  PT AWARE ON OV TO BE TESTED    INJECTION OF ANESTHETIC AGENT INTO SACROILIAC JOINT Bilateral 11/2/2023    Procedure: BLOCK, SACROILIAC JOINT;  Surgeon: Mabel Zambrano MD;  Location: [facility];  Service: Pain Management;  Laterality: Bilateral;    PARTIAL HYSTERECTOMY      RADIOFREQUENCY ABLATION OF LUMBAR MEDIAL BRANCH NERVE AT SINGLE LEVEL Bilateral 12/9/2021    Procedure: Radiofrequency Ablation, Nerve, Spinal, Lumbar, Medial Branch, 1 Level L4-S1 Right side 1st pre-cert left side in 2 weeks;  Surgeon: Mabel Zambrano MD;  Location: [facility];  Service: Pain Management;  Laterality: Bilateral;  vaccination card scanned in    RADIOFREQUENCY ABLATION OF LUMBAR MEDIAL BRANCH NERVE AT SINGLE LEVEL Left 12/23/2021    Procedure: RADIOFREQUENCY ABLATION, NERVE, SPINAL, LUMBAR, MEDIAL BRANCH, 1 LEVEL   LEFT L4-S1 RFTC  (pt already had right side);  Surgeon: Mabel Zambrano MD;  Location: [facility];  Service: Pain Management;  Laterality:  "Left;  VAC  CARD SCANNED IN    RADIOFREQUENCY ABLATION OF LUMBAR MEDIAL BRANCH NERVE AT SINGLE LEVEL Bilateral 1/17/2023    Procedure: Bilateral L4-5,5-S1 MB RFTC  BOTH SIDES SAME DAY;  Surgeon: Mabel Zambrano MD;  Location: [facility];  Service: Pain Management;  Laterality: Bilateral;    TUBAL LIGATION       Social History     Socioeconomic History    Marital status: Legally    Tobacco Use    Smoking status: Never    Smokeless tobacco: Never   Substance and Sexual Activity    Alcohol use: Yes     Comment: occasionally     Family History   Problem Relation Age of Onset    Hypertension Mother     Diabetes Mellitus Mother     Arthritis Mother     Hypertension Father     Diabetes Mellitus Father      Review of patient's allergies indicates:   Allergen Reactions    Pcn [penicillins]         Objective:  Vitals:    12/19/23 1311   BP: (!) 158/101   Pulse: 78   Resp: 18   Weight: 86.6 kg (191 lb)   Height: 5' 3" (1.6 m)   PainSc: 0-No pain         Physical Exam  Vitals and nursing note reviewed. Exam conducted with a chaperone present.   Constitutional:       General: She is awake. She is not in acute distress.     Appearance: Normal appearance. She is not toxic-appearing or diaphoretic.   HENT:      Head: Normocephalic and atraumatic.      Nose: Nose normal.      Mouth/Throat:      Mouth: Mucous membranes are moist.      Pharynx: Oropharynx is clear.   Eyes:      Conjunctiva/sclera: Conjunctivae normal.      Pupils: Pupils are equal, round, and reactive to light.   Cardiovascular:      Rate and Rhythm: Normal rate.   Pulmonary:      Effort: Pulmonary effort is normal. No respiratory distress.   Abdominal:      Palpations: Abdomen is soft.      Tenderness: There is no guarding.   Musculoskeletal:         General: Normal range of motion.      Cervical back: Normal range of motion and neck supple. Tenderness present. No rigidity.      Thoracic back: Tenderness present.      Lumbar back: Tenderness " present.   Skin:     General: Skin is warm and dry.      Coloration: Skin is not jaundiced or pale.   Neurological:      General: No focal deficit present.      Mental Status: She is alert and oriented to person, place, and time. Mental status is at baseline.      Cranial Nerves: No cranial nerve deficit (II-XII).   Psychiatric:         Mood and Affect: Mood normal.         Behavior: Behavior normal. Behavior is cooperative.         Thought Content: Thought content normal.           FL Fluoro for Pain Management  See OP Notes for results.     IMPRESSION: See OP Notes for results.     This procedure was auto-finalized by: Virtual Radiologist         Office Visit on 09/11/2023   Component Date Value Ref Range Status    POC Amphetamines 09/11/2023 Negative  Negative, Inconclusive Final    POC Barbiturates 09/11/2023 Negative  Negative, Inconclusive Final    POC Benzodiazepines 09/11/2023 Negative  Negative, Inconclusive Final    POC Cocaine 09/11/2023 Negative  Negative, Inconclusive Final    POC THC 09/11/2023 Negative  Negative, Inconclusive Final    POC Methadone 09/11/2023 Negative  Negative, Inconclusive Final    POC Methamphetamine 09/11/2023 Negative  Negative, Inconclusive Final    POC Opiates 09/11/2023 Negative  Negative, Inconclusive Final    POC Oxycodone 09/11/2023 Negative  Negative, Inconclusive Final    POC Phencyclidine 09/11/2023 Negative  Negative, Inconclusive Final    POC Methylenedioxymethamphetamine * 09/11/2023 Negative  Negative, Inconclusive Final    POC Tricyclic Antidepressants 09/11/2023 Negative  Negative, Inconclusive Final    POC Buprenorphine 09/11/2023 Negative   Final     Acceptable 09/11/2023 Yes   Final    POC Temperature (Urine) 09/11/2023 90   Final   Office Visit on 06/21/2023   Component Date Value Ref Range Status    POC Amphetamines 06/21/2023 Negative  Negative, Inconclusive Final    POC Barbiturates 06/21/2023 Negative  Negative, Inconclusive Final    POC  Benzodiazepines 06/21/2023 Negative  Negative, Inconclusive Final    POC Cocaine 06/21/2023 Negative  Negative, Inconclusive Final    POC THC 06/21/2023 Negative  Negative, Inconclusive Final    POC Methadone 06/21/2023 Negative  Negative, Inconclusive Final    POC Methamphetamine 06/21/2023 Negative  Negative, Inconclusive Final    POC Opiates 06/21/2023 Negative  Negative, Inconclusive Final    POC Oxycodone 06/21/2023 Negative  Negative, Inconclusive Final    POC Phencyclidine 06/21/2023 Negative  Negative, Inconclusive Final    POC Methylenedioxymethamphetamine * 06/21/2023 Negative  Negative, Inconclusive Final    POC Tricyclic Antidepressants 06/21/2023 Negative  Negative, Inconclusive Final    POC Buprenorphine 06/21/2023 Negative   Final     Acceptable 06/21/2023 Yes   Final    POC Temperature (Urine) 06/21/2023 94   Final         Orders Placed This Encounter   Procedures    POCT Urine Drug Screen Presump     Interpretive Information:     Negative:  No drug detected at the cut off level.   Positive:  This result represents presumptive positive for the   tested drug, other substances may yield a positive response other   than the analyte of interest. This result should be utilized for   diagnostic purpose only. Confirmation testing will be performed upon physician request only.       Case Request Operating Room: BLOCK, SACROILIAC JOINT     Order Specific Question:   Medical Necessity:     Answer:   Medically Non-Urgent [100]     Order Specific Question:   CPT Code:     Answer:   OK INJECTION,SACROILIAC JOINT [73591]     Order Specific Question:   Is an on-site pathologist required for this procedure?     Answer:   N/A         Requested Prescriptions     Signed Prescriptions Disp Refills    traMADoL (ULTRAM) 50 mg tablet 120 tablet 0     Sig: Take 1 tablet (50 mg total) by mouth every 6 (six) hours as needed for Pain.       Assessment:     1. Sacroiliitis    2. Osteoarthrosis multiple sites, not  specified as generalized    3. Cervical radiculopathy    4. Lumbosacral spondylosis without myelopathy    5. Encounter for long-term (current) use of other medications         A's of Opioid Risk Assessment  Activity:Patient can perform ADL.   Analgesia:Patients pain is partially controlled by current medication. Patient has tried OTC medications such as Tylenol and Ibuprofen with out relief.   Adverse Effects: Patient denies constipation or sedation.  Aberrant Behavior:  reviewed with no aberrant drug seeking/taking behavior.  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        Plan:    Narcan January 2023      Presumptive drug screen negative, this is expected result, patient takes tramadol, cup does not test for tramadol    History anemia patient is supposed to take iron supplements she states it is difficult to take due to constipation    Takes gabapentin primary care provider  Celebrex primary care provider       Follow-up after bilateral sacroiliac injection # 1 November 2, 2023 she states she had 80% relief after procedure, the procedure did help improve her level of function    Requesting next sacroiliac procedure    Complaint bilateral lower back pain pointing to her sacroiliac area, she is requesting procedure for sacroiliac discomfort    Continue home exercise program as directed    Tender bilateral sacroiliac area  Bilateral sacroiliac compression test positive  Giselle's /James's positive bilateral  Gaenslen positive bilateral  procedure done prior to surgical consideration    Ongoing Physical therapy/home exercise program as directed no longer controlling discomfort    Patient's pain medication no longer helping control discomfort    Schedule bilateral sacroiliac injection # 2, sacroiliitis    Dr. Zambrano    Bring original prescription medication  bottles/container/box with labels to each visit

## 2023-12-19 ENCOUNTER — OFFICE VISIT (OUTPATIENT)
Dept: PAIN MEDICINE | Facility: CLINIC | Age: 53
End: 2023-12-19
Payer: COMMERCIAL

## 2023-12-19 VITALS
HEART RATE: 78 BPM | SYSTOLIC BLOOD PRESSURE: 158 MMHG | DIASTOLIC BLOOD PRESSURE: 101 MMHG | BODY MASS INDEX: 33.84 KG/M2 | HEIGHT: 63 IN | WEIGHT: 191 LBS | RESPIRATION RATE: 18 BRPM

## 2023-12-19 DIAGNOSIS — Z79.899 ENCOUNTER FOR LONG-TERM (CURRENT) USE OF OTHER MEDICATIONS: ICD-10-CM

## 2023-12-19 DIAGNOSIS — M54.12 CERVICAL RADICULOPATHY: Chronic | ICD-10-CM

## 2023-12-19 DIAGNOSIS — M46.1 SACROILIITIS: Primary | Chronic | ICD-10-CM

## 2023-12-19 DIAGNOSIS — M89.49 OSTEOARTHROSIS MULTIPLE SITES, NOT SPECIFIED AS GENERALIZED: Chronic | ICD-10-CM

## 2023-12-19 DIAGNOSIS — M47.817 LUMBOSACRAL SPONDYLOSIS WITHOUT MYELOPATHY: Chronic | ICD-10-CM

## 2023-12-19 PROCEDURE — 99214 PR OFFICE/OUTPT VISIT, EST, LEVL IV, 30-39 MIN: ICD-10-PCS | Mod: S$PBB,,, | Performed by: PHYSICIAN ASSISTANT

## 2023-12-19 PROCEDURE — 99215 OFFICE O/P EST HI 40 MIN: CPT | Mod: PBBFAC | Performed by: PHYSICIAN ASSISTANT

## 2023-12-19 PROCEDURE — 99214 OFFICE O/P EST MOD 30 MIN: CPT | Mod: S$PBB,,, | Performed by: PHYSICIAN ASSISTANT

## 2023-12-19 RX ORDER — TRAMADOL HYDROCHLORIDE 50 MG/1
50 TABLET ORAL EVERY 6 HOURS PRN
Qty: 120 TABLET | Refills: 0 | Status: SHIPPED | OUTPATIENT
Start: 2023-12-23 | End: 2023-12-19

## 2023-12-19 RX ORDER — TRAMADOL HYDROCHLORIDE 50 MG/1
50 TABLET ORAL EVERY 6 HOURS PRN
Qty: 120 TABLET | Refills: 0 | Status: SHIPPED | OUTPATIENT
Start: 2023-12-19 | End: 2024-02-08 | Stop reason: SDUPTHER

## 2023-12-19 RX ORDER — TRAMADOL HYDROCHLORIDE 50 MG/1
50 TABLET ORAL EVERY 6 HOURS PRN
Qty: 120 TABLET | Refills: 0 | Status: SHIPPED | OUTPATIENT
Start: 2023-12-23 | End: 2023-12-19 | Stop reason: CLARIF

## 2023-12-19 NOTE — PATIENT INSTRUCTIONS

## 2024-01-25 ENCOUNTER — TELEPHONE (OUTPATIENT)
Dept: PAIN MEDICINE | Facility: CLINIC | Age: 54
End: 2024-01-25
Payer: COMMERCIAL

## 2024-01-25 NOTE — TELEPHONE ENCOUNTER
Patient is rescheduled for her SI procedure for 01/30/2024 at 14:30 pm with .  Pre Op Instructions are reviewed patient and she voices understanding.    Procedure Instructions:    Nothing to eat or drink for 8 hours or after midnight including gum, candy, mints, or tobacco products.  If you are scheduled for 1:30 or later nothing to eat or drink after 5 a.m. the morning of the procedure, including gum, candy, mints, or tobacco products.  Must have a  at least 18 yrs of age to stay present at all times  No Diabetic medications the morning of procedure, check blood sugar the morning of procedure, if it is greater than 200 call the office at [phone]  If you are started on antibiotics or have been prescribed antibiotics, have a fever, or have any other type of infection call to reschedule procedure.  If you take blood pressure medications you can take it at your regular scheduled time with a small sip of WATER!  Dentures are to be removed prior to procedure or we can provide you with a denture cup to remove them prior to being taken back for procedure.   False eyelashes are to be removed before the morning of procedure.    Contacts will have to be removed prior to procedure.  No jewelry is to be worn to the procedure.     HOLD ASPIRIN AND ASPIRIN PRODUCTS  (ASPIRIN, BC POWDER ETC. ) FOR 7 DAYS  PRIOR TO PROCEDURE  HOLD NSAIDS( ibuprofen, mobic, meloxicam, advil, diclofenac, naproxen, relafen, celebrex,  methotrexate, aleve etc....)  FOR 3 DAYS   PRIOR TO PROCEDURE

## 2024-01-25 NOTE — TELEPHONE ENCOUNTER
----- Message from Radha Adams sent at 1/25/2024  1:53 PM CST -----  Regarding: procedure  Pt needs to be rescheduled for procedure inable to make procedure appt due to weather please call pt back [phone]    Patient is rescheduled on 01/30/24 at 14:30 pm for her SI procedure with . Patient voices understanding.

## 2024-01-30 ENCOUNTER — ANESTHESIA EVENT (OUTPATIENT)
Dept: PAIN MEDICINE | Facility: HOSPITAL | Age: 54
End: 2024-01-30
Payer: COMMERCIAL

## 2024-01-30 ENCOUNTER — HOSPITAL ENCOUNTER (OUTPATIENT)
Facility: HOSPITAL | Age: 54
Discharge: HOME | End: 2024-01-30
Attending: PAIN MEDICINE | Admitting: PAIN MEDICINE
Payer: COMMERCIAL

## 2024-01-30 ENCOUNTER — ANESTHESIA (OUTPATIENT)
Dept: PAIN MEDICINE | Facility: HOSPITAL | Age: 54
End: 2024-01-30
Payer: COMMERCIAL

## 2024-01-30 VITALS
WEIGHT: 179 LBS | DIASTOLIC BLOOD PRESSURE: 86 MMHG | HEIGHT: 63 IN | RESPIRATION RATE: 10 BRPM | TEMPERATURE: 98 F | HEART RATE: 89 BPM | BODY MASS INDEX: 31.71 KG/M2 | OXYGEN SATURATION: 100 % | SYSTOLIC BLOOD PRESSURE: 125 MMHG

## 2024-01-30 DIAGNOSIS — M46.1 BILATERAL SACROILIITIS: ICD-10-CM

## 2024-01-30 DIAGNOSIS — M46.1 SACROILIITIS: Chronic | ICD-10-CM

## 2024-01-30 PROCEDURE — D9220A PRA ANESTHESIA: Mod: 23,,, | Performed by: NURSE ANESTHETIST, CERTIFIED REGISTERED

## 2024-01-30 PROCEDURE — 63600175 PHARM REV CODE 636 W HCPCS: Performed by: NURSE ANESTHETIST, CERTIFIED REGISTERED

## 2024-01-30 PROCEDURE — 37000008 HC ANESTHESIA 1ST 15 MINUTES: Performed by: PAIN MEDICINE

## 2024-01-30 PROCEDURE — 27000284 HC CANNULA NASAL: Performed by: NURSE ANESTHETIST, CERTIFIED REGISTERED

## 2024-01-30 PROCEDURE — 63600175 PHARM REV CODE 636 W HCPCS: Mod: JG | Performed by: PAIN MEDICINE

## 2024-01-30 PROCEDURE — 27096 INJECT SACROILIAC JOINT: CPT | Mod: 50,,, | Performed by: PAIN MEDICINE

## 2024-01-30 PROCEDURE — 27096 INJECT SACROILIAC JOINT: CPT | Mod: 50 | Performed by: PAIN MEDICINE

## 2024-01-30 PROCEDURE — 37000009 HC ANESTHESIA EA ADD 15 MINS: Performed by: PAIN MEDICINE

## 2024-01-30 PROCEDURE — 25000003 PHARM REV CODE 250: Performed by: NURSE ANESTHETIST, CERTIFIED REGISTERED

## 2024-01-30 RX ORDER — LIDOCAINE HYDROCHLORIDE 20 MG/ML
INJECTION, SOLUTION EPIDURAL; INFILTRATION; INTRACAUDAL; PERINEURAL
Status: DISCONTINUED | OUTPATIENT
Start: 2024-01-30 | End: 2024-01-30

## 2024-01-30 RX ORDER — SODIUM CHLORIDE 9 MG/ML
INJECTION, SOLUTION INTRAVENOUS CONTINUOUS PRN
Status: DISCONTINUED | OUTPATIENT
Start: 2024-01-30 | End: 2024-01-30

## 2024-01-30 RX ORDER — TRIAMCINOLONE ACETONIDE 40 MG/ML
INJECTION, SUSPENSION INTRA-ARTICULAR; INTRAMUSCULAR CODE/TRAUMA/SEDATION MEDICATION
Status: DISCONTINUED | OUTPATIENT
Start: 2024-01-30 | End: 2024-01-30 | Stop reason: HOSPADM

## 2024-01-30 RX ORDER — PROPOFOL 10 MG/ML
VIAL (ML) INTRAVENOUS
Status: DISCONTINUED | OUTPATIENT
Start: 2024-01-30 | End: 2024-01-30

## 2024-01-30 RX ORDER — BUPIVACAINE HYDROCHLORIDE 2.5 MG/ML
INJECTION, SOLUTION INFILTRATION; PERINEURAL CODE/TRAUMA/SEDATION MEDICATION
Status: DISCONTINUED | OUTPATIENT
Start: 2024-01-30 | End: 2024-01-30 | Stop reason: HOSPADM

## 2024-01-30 RX ORDER — SODIUM CHLORIDE 9 MG/ML
INJECTION, SOLUTION INTRAVENOUS CONTINUOUS
Status: DISCONTINUED | OUTPATIENT
Start: 2024-01-30 | End: 2024-01-30 | Stop reason: HOSPADM

## 2024-01-30 RX ADMIN — PROPOFOL 50 MG: 10 INJECTION, EMULSION INTRAVENOUS at 02:01

## 2024-01-30 RX ADMIN — SODIUM CHLORIDE: 9 INJECTION, SOLUTION INTRAVENOUS at 02:01

## 2024-01-30 RX ADMIN — LIDOCAINE HYDROCHLORIDE 50 MG: 20 INJECTION, SOLUTION INTRAVENOUS at 02:01

## 2024-01-30 NOTE — ANESTHESIA PREPROCEDURE EVALUATION
01/30/2024  Miriam Winter is a 53 y.o., female.      Pre-op Assessment    I have reviewed the Patient Summary Reports.     I have reviewed the Nursing Notes. I have reviewed the NPO Status.   I have reviewed the Medications.     Review of Systems  Anesthesia Hx:  No problems with previous Anesthesia             Family Hx of Anesthesia complications:   Personal Hx of Anesthesia complications                    Social:  Non-Smoker       Hematology/Oncology:  Hematology Normal   Oncology Normal                                   EENT/Dental:  EENT/Dental Normal           Cardiovascular:     Hypertension           hyperlipidemia                             Pulmonary:    Asthma                    Renal/:  Renal/ Normal                 Hepatic/GI:     GERD             Musculoskeletal:  Arthritis               Neurological:    Neuromuscular Disease,             Chronic Pain Syndrome                         Endocrine:        Obesity / BMI > 30  Dermatological:  Skin Normal    Psych:  Psychiatric Normal                    Physical Exam  General: Well nourished, Cooperative, Alert and Oriented    Airway:  Mallampati: II   Mouth Opening: Normal  TM Distance: Normal  Tongue: Normal  Neck ROM: Normal ROM    Chest/Lungs:  Clear to auscultation, Normal Respiratory Rate    Heart:  Rate: Normal  Rhythm: Regular Rhythm    Abdomen:  Normal, Soft        Anesthesia Plan  Type of Anesthesia, risks & benefits discussed:    Anesthesia Type: Gen Natural Airway  Intra-op Monitoring Plan: Standard ASA Monitors  Post Op Pain Control Plan: multimodal analgesia  Induction:  IV  Informed Consent: Informed consent signed with the Patient and all parties understand the risks and agree with anesthesia plan.  All questions answered. Patient consented to blood products? Yes  ASA Score: 3    Ready For Surgery From Anesthesia Perspective.      .

## 2024-01-30 NOTE — H&P
Subjective:         Patient ID: Miriam Winter is a 53 y.o. female.    Chief Complaint: Low-back Pain      Pain  This is a chronic problem. The current episode started more than 1 year ago. The problem occurs daily. The problem has been waxing and waning. Associated symptoms include arthralgias and neck pain. Pertinent negatives include no anorexia, change in bowel habit, chest pain, chills, coughing, diaphoresis, fever, sore throat, vertigo or vomiting.     Review of Systems   Constitutional:  Negative for activity change, appetite change, chills, diaphoresis, fever and unexpected weight change.   HENT:  Negative for drooling, ear discharge, ear pain, facial swelling, nosebleeds, sore throat, trouble swallowing, voice change and goiter.    Eyes:  Negative for photophobia, pain, discharge, redness and visual disturbance.   Respiratory:  Negative for apnea, cough, choking, chest tightness, shortness of breath, wheezing and stridor.    Cardiovascular:  Negative for chest pain, palpitations and leg swelling.   Gastrointestinal:  Negative for abdominal distention, anorexia, change in bowel habit, diarrhea, rectal pain, vomiting and fecal incontinence.   Endocrine: Negative for cold intolerance, heat intolerance, polydipsia, polyphagia and polyuria.   Genitourinary:  Negative for bladder incontinence, dysuria, flank pain, frequency and hot flashes.   Musculoskeletal:  Positive for arthralgias, back pain, leg pain and neck pain.   Integumentary:  Negative for color change and pallor.   Allergic/Immunologic: Negative for immunocompromised state.   Neurological:  Negative for dizziness, vertigo, seizures, syncope, facial asymmetry, speech difficulty, light-headedness, memory loss and coordination difficulties.   Hematological:  Negative for adenopathy. Does not bruise/bleed easily.   Psychiatric/Behavioral:  Negative for agitation, behavioral problems, confusion, decreased concentration, dysphoric mood, hallucinations,  self-injury and suicidal ideas. The patient is not nervous/anxious and is not hyperactive.            Past Medical History:   Diagnosis Date    Asthma     Degenerative lumbar disc     GERD (gastroesophageal reflux disease)     Hyperlipidemia     Hypertension     Low back pain     Sciatica      Past Surgical History:   Procedure Laterality Date    CHOLECYSTECTOMY      HYSTERECTOMY      INJECTION OF ANESTHETIC AGENT AROUND MEDIAL BRANCH NERVES INNERVATING LUMBAR FACET JOINT Bilateral 9/23/2021    Procedure: Bilateral L4-5,5-S1 MBB;  Surgeon: Mabel Zambrano MD;  Location: [facility];  Service: Pain Management;  Laterality: Bilateral;  PT AWARE TO BE TESTED    INJECTION OF ANESTHETIC AGENT AROUND MEDIAL BRANCH NERVES INNERVATING LUMBAR FACET JOINT Bilateral 10/26/2021    Procedure: Block-nerve-medial branch-lumbar, bilateral L4 through S1;  Surgeon: Mabel Zambrano MD;  Location: [facility];  Service: Pain Management;  Laterality: Bilateral;  PT AWARE ON OV TO BE TESTED    INJECTION OF ANESTHETIC AGENT INTO SACROILIAC JOINT Bilateral 11/2/2023    Procedure: BLOCK, SACROILIAC JOINT;  Surgeon: Mabel Zambrano MD;  Location: [facility];  Service: Pain Management;  Laterality: Bilateral;    PARTIAL HYSTERECTOMY      RADIOFREQUENCY ABLATION OF LUMBAR MEDIAL BRANCH NERVE AT SINGLE LEVEL Bilateral 12/9/2021    Procedure: Radiofrequency Ablation, Nerve, Spinal, Lumbar, Medial Branch, 1 Level L4-S1 Right side 1st pre-cert left side in 2 weeks;  Surgeon: Mabel Zambrano MD;  Location: [facility];  Service: Pain Management;  Laterality: Bilateral;  vaccination card scanned in    RADIOFREQUENCY ABLATION OF LUMBAR MEDIAL BRANCH NERVE AT SINGLE LEVEL Left 12/23/2021    Procedure: RADIOFREQUENCY ABLATION, NERVE, SPINAL, LUMBAR, MEDIAL BRANCH, 1 LEVEL   LEFT L4-S1 RFTC  (pt already had right side);  Surgeon: Mabel Zambrano MD;  Location: [facility];  Service: Pain Management;  Laterality:  "Left;  VAC  CARD SCANNED IN    RADIOFREQUENCY ABLATION OF LUMBAR MEDIAL BRANCH NERVE AT SINGLE LEVEL Bilateral 1/17/2023    Procedure: Bilateral L4-5,5-S1 MB RFTC  BOTH SIDES SAME DAY;  Surgeon: Mabel Zambrano MD;  Location: [facility];  Service: Pain Management;  Laterality: Bilateral;    TUBAL LIGATION       Social History     Socioeconomic History    Marital status: Legally    Tobacco Use    Smoking status: Never    Smokeless tobacco: Never   Substance and Sexual Activity    Alcohol use: Yes     Comment: occasionally     Family History   Problem Relation Age of Onset    Hypertension Mother     Diabetes Mellitus Mother     Arthritis Mother     Hypertension Father     Diabetes Mellitus Father      Review of patient's allergies indicates:   Allergen Reactions    Pcn [penicillins]         Objective:  Vitals:    12/19/23 1311   BP: (!) 158/101   Pulse: 78   Resp: 18   Weight: 86.6 kg (191 lb)   Height: 5' 3" (1.6 m)   PainSc: 0-No pain         Physical Exam  Vitals and nursing note reviewed. Exam conducted with a chaperone present.   Constitutional:       General: She is awake. She is not in acute distress.     Appearance: Normal appearance. She is not toxic-appearing or diaphoretic.   HENT:      Head: Normocephalic and atraumatic.      Nose: Nose normal.      Mouth/Throat:      Mouth: Mucous membranes are moist.      Pharynx: Oropharynx is clear.   Eyes:      Conjunctiva/sclera: Conjunctivae normal.      Pupils: Pupils are equal, round, and reactive to light.   Cardiovascular:      Rate and Rhythm: Normal rate.   Pulmonary:      Effort: Pulmonary effort is normal. No respiratory distress.   Abdominal:      Palpations: Abdomen is soft.      Tenderness: There is no guarding.   Musculoskeletal:         General: Normal range of motion.      Cervical back: Normal range of motion and neck supple. Tenderness present. No rigidity.      Thoracic back: Tenderness present.      Lumbar back: Tenderness " present.   Skin:     General: Skin is warm and dry.      Coloration: Skin is not jaundiced or pale.   Neurological:      General: No focal deficit present.      Mental Status: She is alert and oriented to person, place, and time. Mental status is at baseline.      Cranial Nerves: No cranial nerve deficit (II-XII).   Psychiatric:         Mood and Affect: Mood normal.         Behavior: Behavior normal. Behavior is cooperative.         Thought Content: Thought content normal.           FL Fluoro for Pain Management  See OP Notes for results.     IMPRESSION: See OP Notes for results.     This procedure was auto-finalized by: Virtual Radiologist         Office Visit on 09/11/2023   Component Date Value Ref Range Status    POC Amphetamines 09/11/2023 Negative  Negative, Inconclusive Final    POC Barbiturates 09/11/2023 Negative  Negative, Inconclusive Final    POC Benzodiazepines 09/11/2023 Negative  Negative, Inconclusive Final    POC Cocaine 09/11/2023 Negative  Negative, Inconclusive Final    POC THC 09/11/2023 Negative  Negative, Inconclusive Final    POC Methadone 09/11/2023 Negative  Negative, Inconclusive Final    POC Methamphetamine 09/11/2023 Negative  Negative, Inconclusive Final    POC Opiates 09/11/2023 Negative  Negative, Inconclusive Final    POC Oxycodone 09/11/2023 Negative  Negative, Inconclusive Final    POC Phencyclidine 09/11/2023 Negative  Negative, Inconclusive Final    POC Methylenedioxymethamphetamine * 09/11/2023 Negative  Negative, Inconclusive Final    POC Tricyclic Antidepressants 09/11/2023 Negative  Negative, Inconclusive Final    POC Buprenorphine 09/11/2023 Negative   Final     Acceptable 09/11/2023 Yes   Final    POC Temperature (Urine) 09/11/2023 90   Final   Office Visit on 06/21/2023   Component Date Value Ref Range Status    POC Amphetamines 06/21/2023 Negative  Negative, Inconclusive Final    POC Barbiturates 06/21/2023 Negative  Negative, Inconclusive Final    POC  Benzodiazepines 06/21/2023 Negative  Negative, Inconclusive Final    POC Cocaine 06/21/2023 Negative  Negative, Inconclusive Final    POC THC 06/21/2023 Negative  Negative, Inconclusive Final    POC Methadone 06/21/2023 Negative  Negative, Inconclusive Final    POC Methamphetamine 06/21/2023 Negative  Negative, Inconclusive Final    POC Opiates 06/21/2023 Negative  Negative, Inconclusive Final    POC Oxycodone 06/21/2023 Negative  Negative, Inconclusive Final    POC Phencyclidine 06/21/2023 Negative  Negative, Inconclusive Final    POC Methylenedioxymethamphetamine * 06/21/2023 Negative  Negative, Inconclusive Final    POC Tricyclic Antidepressants 06/21/2023 Negative  Negative, Inconclusive Final    POC Buprenorphine 06/21/2023 Negative   Final     Acceptable 06/21/2023 Yes   Final    POC Temperature (Urine) 06/21/2023 94   Final         Orders Placed This Encounter   Procedures    POCT Urine Drug Screen Presump     Interpretive Information:     Negative:  No drug detected at the cut off level.   Positive:  This result represents presumptive positive for the   tested drug, other substances may yield a positive response other   than the analyte of interest. This result should be utilized for   diagnostic purpose only. Confirmation testing will be performed upon physician request only.       Case Request Operating Room: BLOCK, SACROILIAC JOINT     Order Specific Question:   Medical Necessity:     Answer:   Medically Non-Urgent [100]     Order Specific Question:   CPT Code:     Answer:   [place] INJECTION,SACROILIAC JOINT [59459]     Order Specific Question:   Is an on-site pathologist required for this procedure?     Answer:   N/A         Requested Prescriptions     Signed Prescriptions Disp Refills    traMADoL (ULTRAM) 50 mg tablet 120 tablet 0     Sig: Take 1 tablet (50 mg total) by mouth every 6 (six) hours as needed for Pain.       Assessment:     1. Sacroiliitis    2. Osteoarthrosis multiple sites, not  specified as generalized    3. Cervical radiculopathy    4. Lumbosacral spondylosis without myelopathy    5. Encounter for long-term (current) use of other medications         A's of Opioid Risk Assessment  Activity:Patient can perform ADL.   Analgesia:Patients pain is partially controlled by current medication. Patient has tried OTC medications such as Tylenol and Ibuprofen with out relief.   Adverse Effects: Patient denies constipation or sedation.  Aberrant Behavior:  reviewed with no aberrant drug seeking/taking behavior.  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        Plan:    Narcan January 2023      Presumptive drug screen negative, this is expected result, patient takes tramadol, cup does not test for tramadol    History anemia patient is supposed to take iron supplements she states it is difficult to take due to constipation    Takes gabapentin primary care provider  Celebrex primary care provider       Follow-up after bilateral sacroiliac injection # 1 November 2, 2023 she states she had 80% relief after procedure, the procedure did help improve her level of function    Requesting next sacroiliac procedure    Complaint bilateral lower back pain pointing to her sacroiliac area, she is requesting procedure for sacroiliac discomfort    Continue home exercise program as directed    Tender bilateral sacroiliac area  Bilateral sacroiliac compression test positive  Giselle's /James's positive bilateral  Gaenslen positive bilateral  procedure done prior to surgical consideration    Ongoing Physical therapy/home exercise program as directed no longer controlling discomfort    Patient's pain medication no longer helping control discomfort    Schedule bilateral sacroiliac injection # 2, sacroiliitis    Dr. Zambrano    Bring original prescription medication  bottles/container/box with labels to each visit         Review of Systems   Constitutional:  Negative for activity change, appetite change, chills, diaphoresis, fever and unexpected weight change.   HENT:  Negative for drooling, ear discharge, ear pain, facial swelling, nosebleeds, sore throat, trouble swallowing and voice change.    Eyes:  Negative for photophobia, pain, discharge, redness and visual disturbance.   Respiratory:  Negative for apnea, cough, choking, chest tightness, shortness of breath, wheezing and stridor.    Cardiovascular:  Negative for chest pain, palpitations and leg swelling.   Gastrointestinal:  Negative for abdominal distention, anorexia, change in bowel habit, diarrhea, rectal pain and vomiting.   Genitourinary:  Negative for bladder incontinence, dysuria, flank pain and frequency.   Musculoskeletal:  Positive for arthralgias, back pain and neck pain.   Skin:  Negative for color change and pallor.   Neurological:  Negative for dizziness, vertigo, seizures, syncope, facial asymmetry, speech difficulty, light-headedness and disturbances in coordination.   Endo/Heme/Allergies:  Negative for cold intolerance, heat intolerance, polydipsia, polyphagia and adenopathy. Does not bruise/bleed easily.   Psychiatric/Behavioral:  Negative for agitation, behavioral problems, confusion, decreased concentration, dysphoric mood, hallucinations, self-injury and suicidal ideas. The patient is not nervous/anxious and is not hyperactive.

## 2024-01-30 NOTE — ANESTHESIA POSTPROCEDURE EVALUATION
Anesthesia Post Evaluation    Patient: Miriam Winter    Procedure(s) Performed: Procedure(s) (LRB):  INJECTION,SACROILIAC JOINT (N/A)    Final Anesthesia Type: general      Patient participation: Yes- Able to Participate  Level of consciousness: awake and alert  Post-procedure vital signs: reviewed and stable  Pain management: adequate  Airway patency: patent    PONV status at discharge: No PONV  Anesthetic complications: no      Cardiovascular status: blood pressure returned to baseline, hemodynamically stable and stable  Respiratory status: unassisted  Hydration status: euvolemic  Follow-up not needed.  Comments: Refer to nursing notes for pain/carlos score upon discharge from recovery              Vitals Value Taken Time   /87 01/30/24 1521   Temp 36.7 °C (98 °F) 01/30/24 1450   Pulse 92 01/30/24 1522   Resp 17 01/30/24 1522   SpO2 100 % 01/30/24 1522   Vitals shown include unvalidated device data.      Event Time   Out of Recovery 15:20:00         Pain/Carlos Score: Carlos Score: 10 (1/30/2024  3:10 PM)

## 2024-01-30 NOTE — OP NOTE
Procedure Note    Procedure Date: 1/30/2024    Procedure Performed: Bilateral Sacroiliac joint injection, with Fluoroscopic Guidance    Indications: Patient has failed conservative therapy.      Pre-op diagnosis: Bilateral  sided Sacroiliitis    Post-op diagnosis: same    Physician: FLORES Zambrano MD    Anesthesia: MAC    Medications injected: 0.25% Bupivacaine, Kenalog 40mg    Local anesthetic used: 1% Lidocaine, 2ml    Estimated Blood Loss: None    Complications:None    Technique:  The patient was interviewed in the holding area and Risks/Benefits were discussed, including, but not limited to, the possibility of new or different pain, bleeding or infection.   All questions were answered.  The patient understood and accepted risks.  Consent was reviewed and signed.   A time out was taken to identify the patient, procedure and side of the procedure.  The skin was prepped with chloroprep and sterile drapes were applied. The bilateral  SI joint was identified by fluoroscopy in an oblique plane. Skin and subcutaneous tissues overyling the target area was anesthetized with 1-2 mL of Lidocaine 1%.  A 22g 3 1/2 inch spinal needle was advanced under intermittent fluoroscopy until joint space was entered at the junction of the superior 2/3 & inferior 1/3 of the joint.  There was no paresthesia with needle placement. Aspiration was negative for blood. Next, a 2 cc solution from a mixture of 40 mg kenalog and 3mL of 0.25% Marcaine was injected without difficulty or resistance into each joint. The needle was removed and a sterile Band-Aid dressing was applied to the puncture site. The patient tolerated the procedure well.  The patient was monitored after the procedure.  The patient was given post procedure and discharge instructions to follow at home. The patient was discharged in a stable condition

## 2024-01-30 NOTE — TRANSFER OF CARE
"Anesthesia Transfer of Care Note    Patient: Miriam Winter    Procedure(s) Performed: Procedure(s) (LRB):  INJECTION,SACROILIAC JOINT (N/A)    Patient location: PACU    Anesthesia Type: general    Transport from OR: Transported from OR on room air with adequate spontaneous ventilation    Post pain: adequate analgesia    Post assessment: no apparent anesthetic complications    Post vital signs: stable    Level of consciousness: responds to stimulation    Nausea/Vomiting: no nausea/vomiting    Complications: none    Transfer of care protocol was followed      Last vitals: Visit Vitals  /71   Pulse 97   Temp 36.7 °C (98 °F)   Resp 14   Ht 5' 3" (1.6 m)   Wt 81.2 kg (179 lb)   SpO2 96%   BMI 31.71 kg/m²     " This is the incorrect patient. Routed back to [facility] for correction.

## 2024-01-30 NOTE — BRIEF OP NOTE
The  Discharge Note  Short Stay    Admit Date: 1/30/2024    Discharge Date: 1/30/2024    Attending Physician: Mabel Zambrano     Discharge Provider: Mabel Zambrano    Diagnosis: Bilateral sacroiliitis    Procedure performed: Bilateral SI joint injection under fluoroscopy    Findings: Procedure tolerated well and without complications. Consistent with diagnosis.    EBL: 0cc    Specimens: None    Discharged Condition: Good    Final Diagnoses: Sacroiliitis [M46.1]    Disposition: Home or Self Care    Hospital Course: No complications, uneventful    Outcome of Hospitalization, Treatment, Procedure, or Surgery:  Patient was admitted for outpatient interventional pain management procedure. The patient tolerated the procedure well with no complications.    Follow up/Patient Instructions:  Follow up as scheduled in Pain Management office in 3-4 weeks.  Patient has received instructions and follow up date and time.    Medications:  Continue previous medications

## 2024-01-30 NOTE — PLAN OF CARE
Plan:  D/c pt via wheelchair at 1525  Informed pt if does not void in 8 hours to go to ER. Notify if redness, drainage, from injection site or fever over next 3-4 days. Rest and drink plenty of fluids for the remainder of the day. No lifting over 5 lbs. For the remainder of the day. Continue regular medications as prescribed. May take pain medications as prescribed.     Pain improved 100%  Pre-procedure pain: 7  Post-procedure pain: 0

## 2024-01-30 NOTE — DISCHARGE SUMMARY
[facility] - Pain Management  Discharge Note  Short Stay    Procedure(s) (LRB):  INJECTION,SACROILIAC JOINT (N/A)      OUTCOME: Patient tolerated treatment/procedure well without complication and is now ready for discharge.    DISPOSITION: Home or Self Care    FINAL DIAGNOSIS:  Bilateral sacroiliitis    FOLLOWUP: In clinic    DISCHARGE INSTRUCTIONS:  Bilateral SI joint injection under fluoroscopy     TIME SPENT ON DISCHARGE: 5 minutes

## 2024-02-08 NOTE — PROGRESS NOTES
Subjective:         Patient ID: Miriam Winter is a 53 y.o. female.    Chief Complaint: Low-back Pain        Pain  This is a chronic problem. The current episode started more than 1 year ago. The problem occurs daily. The problem has been gradually improving. Associated symptoms include arthralgias and neck pain. Pertinent negatives include no anorexia, change in bowel habit, chest pain, chills, coughing, diaphoresis, fever, sore throat, vertigo or vomiting.     Review of Systems   Constitutional:  Negative for activity change, appetite change, chills, diaphoresis, fever and unexpected weight change.   HENT:  Negative for drooling, ear discharge, ear pain, facial swelling, nosebleeds, sore throat, trouble swallowing, voice change and goiter.    Eyes:  Negative for photophobia, pain, discharge, redness and visual disturbance.   Respiratory:  Negative for apnea, cough, choking, chest tightness, shortness of breath, wheezing and stridor.    Cardiovascular:  Negative for chest pain, palpitations and leg swelling.   Gastrointestinal:  Negative for abdominal distention, anorexia, change in bowel habit, diarrhea, rectal pain, vomiting and fecal incontinence.   Endocrine: Negative for cold intolerance, heat intolerance, polydipsia, polyphagia and polyuria.   Genitourinary:  Negative for bladder incontinence, dysuria, flank pain, frequency and hot flashes.   Musculoskeletal:  Positive for arthralgias, back pain, leg pain and neck pain.   Integumentary:  Negative for color change and pallor.   Allergic/Immunologic: Negative for immunocompromised state.   Neurological:  Negative for dizziness, vertigo, seizures, syncope, facial asymmetry, speech difficulty, light-headedness, memory loss and coordination difficulties.   Hematological:  Negative for adenopathy. Does not bruise/bleed easily.   Psychiatric/Behavioral:  Negative for agitation, behavioral problems, confusion, decreased concentration, dysphoric mood, hallucinations,  self-injury and suicidal ideas. The patient is not nervous/anxious and is not hyperactive.            Past Medical History:   Diagnosis Date    Asthma     Degenerative lumbar disc     GERD (gastroesophageal reflux disease)     Hyperlipidemia     Hypertension     Low back pain     Sciatica      Past Surgical History:   Procedure Laterality Date    CHOLECYSTECTOMY      HYSTERECTOMY      INJECTION OF ANESTHETIC AGENT AROUND MEDIAL BRANCH NERVES INNERVATING LUMBAR FACET JOINT Bilateral 9/23/2021    Procedure: Bilateral L4-5,5-S1 MBB;  Surgeon: Mabel Zambrano MD;  [facility];  Service: Pain Management;  Laterality: Bilateral;  PT AWARE TO BE TESTED    INJECTION OF ANESTHETIC AGENT AROUND MEDIAL BRANCH NERVES INNERVATING LUMBAR FACET JOINT Bilateral 10/26/2021    Procedure: Block-nerve-medial branch-lumbar, bilateral L4 through S1;  Surgeon: Mabel Zambrano MD;  [facility];  Service: Pain Management;  Laterality: Bilateral;  PT AWARE ON OV TO BE TESTED    INJECTION OF ANESTHETIC AGENT INTO SACROILIAC JOINT Bilateral 11/2/2023    Procedure: BLOCK, SACROILIAC JOINT;  Surgeon: Mabel Zambrano MD;  [facility];  Service: Pain Management;  Laterality: Bilateral;    INJECTION, SACROILIAC JOINT N/A 1/30/2024    Procedure: INJECTION,SACROILIAC JOINT;  Surgeon: Mabel Zambrano MD;  [facility];  Service: Pain Management;  Laterality: N/A;    PARTIAL HYSTERECTOMY      RADIOFREQUENCY ABLATION OF LUMBAR MEDIAL BRANCH NERVE AT SINGLE LEVEL Bilateral 12/9/2021    Procedure: Radiofrequency Ablation, Nerve, Spinal, Lumbar, Medial Branch, 1 Level L4-S1 Right side 1st pre-cert left side in 2 weeks;  Surgeon: Mabel Zambrano MD;  [facility];  Service: Pain Management;  Laterality: Bilateral;  vaccination card scanned in    RADIOFREQUENCY ABLATION OF LUMBAR MEDIAL BRANCH NERVE AT SINGLE LEVEL Left 12/23/2021    Procedure: RADIOFREQUENCY ABLATION,  "NERVE, SPINAL, LUMBAR, MEDIAL BRANCH, 1 LEVEL   LEFT L4-S1 RFTC  (pt already had right side);  Surgeon: Mabel Zambrano MD;  Location: [facility];  Service: Pain Management;  Laterality: Left;  VAC  CARD SCANNED IN    RADIOFREQUENCY ABLATION OF LUMBAR MEDIAL BRANCH NERVE AT SINGLE LEVEL Bilateral 1/17/2023    Procedure: Bilateral L4-5,5-S1 MB RFTC  BOTH SIDES SAME DAY;  Surgeon: Mabel Zambrano MD;  Location: [facility];  Service: Pain Management;  Laterality: Bilateral;    TUBAL LIGATION       Social History     Socioeconomic History    Marital status: Legally    Tobacco Use    Smoking status: Never    Smokeless tobacco: Never   Substance and Sexual Activity    Alcohol use: Yes     Comment: occasionally     Family History   Problem Relation Age of Onset    Hypertension Mother     Diabetes Mellitus Mother     Arthritis Mother     Hypertension Father     Diabetes Mellitus Father      Review of patient's allergies indicates:   Allergen Reactions    Pcn [penicillins]         Objective:  Vitals:    02/13/24 1322   BP: (!) 144/77   Pulse: 87   Resp: 18   Weight: 83 kg (183 lb)   Height: 5' 3" (1.6 m)   PainSc:   5         Physical Exam  Vitals and nursing note reviewed. Exam conducted with a chaperone present.   Constitutional:       General: She is awake. She is not in acute distress.     Appearance: Normal appearance. She is not toxic-appearing or diaphoretic.   HENT:      Head: Normocephalic and atraumatic.      Nose: Nose normal.      Mouth/Throat:      Mouth: Mucous membranes are moist.      Pharynx: Oropharynx is clear.   Eyes:      Conjunctiva/sclera: Conjunctivae normal.      Pupils: Pupils are equal, round, and reactive to light.   Cardiovascular:      Rate and Rhythm: Normal rate.   Pulmonary:      Effort: Pulmonary effort is normal. No respiratory distress.   Abdominal:      Palpations: Abdomen is soft.      Tenderness: There is no guarding.   Musculoskeletal:         General: Normal " range of motion.      Cervical back: Normal range of motion and neck supple. Tenderness present. No rigidity.      Thoracic back: Tenderness present.      Lumbar back: Tenderness present.   Skin:     General: Skin is warm and dry.      Coloration: Skin is not jaundiced or pale.   Neurological:      General: No focal deficit present.      Mental Status: She is alert and oriented to person, place, and time. Mental status is at baseline.      Cranial Nerves: No cranial nerve deficit (II-XII).   Psychiatric:         Mood and Affect: Mood normal.         Behavior: Behavior normal. Behavior is cooperative.         Thought Content: Thought content normal.           [place] Fluoro for Pain Management  See OP Notes for results.     IMPRESSION: See OP Notes for results.     This procedure was auto-finalized by: Virtual Radiologist         Office Visit on 09/11/2023   Component Date Value Ref Range Status    POC Amphetamines 09/11/2023 Negative  Negative, Inconclusive Final    POC Barbiturates 09/11/2023 Negative  Negative, Inconclusive Final    POC Benzodiazepines 09/11/2023 Negative  Negative, Inconclusive Final    POC Cocaine 09/11/2023 Negative  Negative, Inconclusive Final    POC THC 09/11/2023 Negative  Negative, Inconclusive Final    POC Methadone 09/11/2023 Negative  Negative, Inconclusive Final    POC Methamphetamine 09/11/2023 Negative  Negative, Inconclusive Final    POC Opiates 09/11/2023 Negative  Negative, Inconclusive Final    POC Oxycodone 09/11/2023 Negative  Negative, Inconclusive Final    POC Phencyclidine 09/11/2023 Negative  Negative, Inconclusive Final    POC Methylenedioxymethamphetamine * 09/11/2023 Negative  Negative, Inconclusive Final    POC Tricyclic Antidepressants 09/11/2023 Negative  Negative, Inconclusive Final    POC Buprenorphine 09/11/2023 Negative   Final     Acceptable 09/11/2023 Yes   Final    POC Temperature (Urine) 09/11/2023 90   Final         No orders of the defined types  were placed in this encounter.        Requested Prescriptions     Signed Prescriptions Disp Refills    traMADoL (ULTRAM) 50 mg tablet 120 tablet 1     Sig: Take 1 tablet (50 mg total) by mouth every 6 (six) hours as needed for Pain.       Assessment:     1. Sacroiliitis    2. Osteoarthrosis multiple sites, not specified as generalized    3. Cervical radiculopathy    4. Lumbosacral spondylosis without myelopathy         A's of Opioid Risk Assessment  Activity:Patient can perform ADL.   Analgesia:Patients pain is partially controlled by current medication. Patient has tried OTC medications such as Tylenol and Ibuprofen with out relief.   Adverse Effects: Patient denies constipation or sedation.  Aberrant Behavior:  reviewed with no aberrant drug seeking/taking behavior.  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        Plan:    Narcan January 2023      Presumptive drug screen negative, this is expected result, patient takes tramadol, cup does not test for tramadol    Takes gabapentin primary care provider  Celebrex primary care provider       Follow-up after bilateral sacroiliac injection # 2, January 30, 2024 she states she had 80% relief after procedure, the procedure did help improve her level of function    She states that this time she would like to continue with conservative management    Continue home exercise program as directed    Follow-up 2 months    Dr. Zambrano January 2025    Bring original prescription medication bottles/container/box with labels to each visit

## 2024-02-13 ENCOUNTER — OFFICE VISIT (OUTPATIENT)
Dept: PAIN MEDICINE | Facility: CLINIC | Age: 54
End: 2024-02-13
Payer: COMMERCIAL

## 2024-02-13 VITALS
DIASTOLIC BLOOD PRESSURE: 77 MMHG | RESPIRATION RATE: 18 BRPM | WEIGHT: 183 LBS | HEIGHT: 63 IN | BODY MASS INDEX: 32.43 KG/M2 | SYSTOLIC BLOOD PRESSURE: 144 MMHG | HEART RATE: 87 BPM

## 2024-02-13 DIAGNOSIS — M46.1 SACROILIITIS: Primary | Chronic | ICD-10-CM

## 2024-02-13 DIAGNOSIS — M47.817 LUMBOSACRAL SPONDYLOSIS WITHOUT MYELOPATHY: Chronic | ICD-10-CM

## 2024-02-13 DIAGNOSIS — M54.12 CERVICAL RADICULOPATHY: Chronic | ICD-10-CM

## 2024-02-13 DIAGNOSIS — M89.49 OSTEOARTHROSIS MULTIPLE SITES, NOT SPECIFIED AS GENERALIZED: Chronic | ICD-10-CM

## 2024-02-13 PROCEDURE — 3077F SYST BP >= 140 MM HG: CPT | Mod: CPTII,,, | Performed by: PHYSICIAN ASSISTANT

## 2024-02-13 PROCEDURE — 99214 OFFICE O/P EST MOD 30 MIN: CPT | Mod: S$PBB,,, | Performed by: PHYSICIAN ASSISTANT

## 2024-02-13 PROCEDURE — 99215 OFFICE O/P EST HI 40 MIN: CPT | Mod: PBBFAC | Performed by: PHYSICIAN ASSISTANT

## 2024-02-13 PROCEDURE — 3008F BODY MASS INDEX DOCD: CPT | Mod: CPTII,,, | Performed by: PHYSICIAN ASSISTANT

## 2024-02-13 PROCEDURE — 1159F MED LIST DOCD IN RCRD: CPT | Mod: CPTII,,, | Performed by: PHYSICIAN ASSISTANT

## 2024-02-13 PROCEDURE — 3078F DIAST BP <80 MM HG: CPT | Mod: CPTII,,, | Performed by: PHYSICIAN ASSISTANT

## 2024-02-13 RX ORDER — TRAMADOL HYDROCHLORIDE 50 MG/1
50 TABLET ORAL EVERY 6 HOURS PRN
Qty: 120 TABLET | Refills: 1 | Status: SHIPPED | OUTPATIENT
Start: 2024-02-17 | End: 2024-04-15 | Stop reason: SDUPTHER

## 2024-04-15 NOTE — PROGRESS NOTES
Subjective:         Patient ID: Miriam Winter is a 53 y.o. female.    Chief Complaint: Low-back Pain        Pain  This is a chronic problem. The current episode started more than 1 year ago. The problem occurs daily. The problem has been waxing and waning. Associated symptoms include arthralgias and neck pain. Pertinent negatives include no anorexia, change in bowel habit, chest pain, chills, coughing, diaphoresis, fever, sore throat, vertigo or vomiting.     Review of Systems   Constitutional:  Negative for activity change, appetite change, chills, diaphoresis, fever and unexpected weight change.   HENT:  Negative for drooling, ear discharge, ear pain, facial swelling, nosebleeds, sore throat, trouble swallowing, voice change and goiter.    Eyes:  Negative for photophobia, pain, discharge, redness and visual disturbance.   Respiratory:  Negative for apnea, cough, choking, chest tightness, shortness of breath, wheezing and stridor.    Cardiovascular:  Negative for chest pain, palpitations and leg swelling.   Gastrointestinal:  Negative for abdominal distention, anorexia, change in bowel habit, diarrhea, rectal pain, vomiting and fecal incontinence.   Endocrine: Negative for cold intolerance, heat intolerance, polydipsia, polyphagia and polyuria.   Genitourinary:  Negative for bladder incontinence, dysuria, flank pain, frequency and hot flashes.   Musculoskeletal:  Positive for arthralgias, back pain, leg pain and neck pain.   Integumentary:  Negative for color change and pallor.   Allergic/Immunologic: Negative for immunocompromised state.   Neurological:  Negative for dizziness, vertigo, seizures, syncope, facial asymmetry, speech difficulty, light-headedness, memory loss and coordination difficulties.   Hematological:  Negative for adenopathy. Does not bruise/bleed easily.   Psychiatric/Behavioral:  Negative for agitation, behavioral problems, confusion, decreased concentration, dysphoric mood, hallucinations,  self-injury and suicidal ideas. The patient is not nervous/anxious and is not hyperactive.            Past Medical History:   Diagnosis Date    Asthma     Degenerative lumbar disc     GERD (gastroesophageal reflux disease)     Hyperlipidemia     Hypertension     Low back pain     Sciatica      Past Surgical History:   Procedure Laterality Date    CHOLECYSTECTOMY      HYSTERECTOMY      INJECTION OF ANESTHETIC AGENT AROUND MEDIAL BRANCH NERVES INNERVATING LUMBAR FACET JOINT Bilateral 9/23/2021    Procedure: Bilateral L4-5,5-S1 MBB;  Surgeon: Mabel Zambrano MD;  Location: [facility];  Service: Pain Management;  Laterality: Bilateral;  PT AWARE TO BE TESTED    INJECTION OF ANESTHETIC AGENT AROUND MEDIAL BRANCH NERVES INNERVATING LUMBAR FACET JOINT Bilateral 10/26/2021    Procedure: Block-nerve-medial branch-lumbar, bilateral L4 through S1;  Surgeon: Mabel Zambrano MD;  Location: [facility];  Service: Pain Management;  Laterality: Bilateral;  PT AWARE ON OV TO BE TESTED    INJECTION OF ANESTHETIC AGENT INTO SACROILIAC JOINT Bilateral 11/2/2023    Procedure: BLOCK, SACROILIAC JOINT;  Surgeon: Mabel Zambrano MD;  Location: [facility];  Service: Pain Management;  Laterality: Bilateral;    INJECTION, SACROILIAC JOINT N/A 1/30/2024    Procedure: INJECTION,SACROILIAC JOINT;  Surgeon: Mabel Zambrano MD;  Location: [facility];  Service: Pain Management;  Laterality: N/A;    PARTIAL HYSTERECTOMY      RADIOFREQUENCY ABLATION OF LUMBAR MEDIAL BRANCH NERVE AT SINGLE LEVEL Bilateral 12/9/2021    Procedure: Radiofrequency Ablation, Nerve, Spinal, Lumbar, Medial Branch, 1 Level L4-S1 Right side 1st pre-cert left side in 2 weeks;  Surgeon: Mabel Zambrano MD;  Location: [facility];  Service: Pain Management;  Laterality: Bilateral;  vaccination card scanned in    RADIOFREQUENCY ABLATION OF LUMBAR MEDIAL BRANCH NERVE AT SINGLE LEVEL Left 12/23/2021    Procedure: RADIOFREQUENCY ABLATION,  "NERVE, SPINAL, LUMBAR, MEDIAL BRANCH, 1 LEVEL   LEFT L4-S1 RFTC  (pt already had right side);  Surgeon: Mabel Zambrano MD;  Location: [facility];  Service: Pain Management;  Laterality: Left;  VAC  CARD SCANNED IN    RADIOFREQUENCY ABLATION OF LUMBAR MEDIAL BRANCH NERVE AT SINGLE LEVEL Bilateral 1/17/2023    Procedure: Bilateral L4-5,5-S1 MB RFTC  BOTH SIDES SAME DAY;  Surgeon: Mabel Zambrano MD;  Location: [facility];  Service: Pain Management;  Laterality: Bilateral;    TUBAL LIGATION       Social History     Socioeconomic History    Marital status: Legally    Tobacco Use    Smoking status: Never    Smokeless tobacco: Never   Substance and Sexual Activity    Alcohol use: Yes     Comment: occasionally     Family History   Problem Relation Name Age of Onset    Hypertension Mother      Diabetes Mellitus Mother      Arthritis Mother      Hypertension Father      Diabetes Mellitus Father       Review of patient's allergies indicates:   Allergen Reactions    Pcn [penicillins]         Objective:  Vitals:    04/17/24 1124   BP: (!) 143/91   Pulse: 73   Resp: 16   Weight: 80.7 kg (178 lb)   Height: 5' 3" (1.6 m)   PainSc:   4           Physical Exam  Vitals and nursing note reviewed. Exam conducted with a chaperone present.   Constitutional:       General: She is awake. She is not in acute distress.     Appearance: Normal appearance. She is not toxic-appearing or diaphoretic.   HENT:      Head: Normocephalic and atraumatic.      Nose: Nose normal.      Mouth/Throat:      Mouth: Mucous membranes are moist.      Pharynx: Oropharynx is clear.   Eyes:      Conjunctiva/sclera: Conjunctivae normal.      Pupils: Pupils are equal, round, and reactive to light.   Cardiovascular:      Rate and Rhythm: Normal rate.   Pulmonary:      Effort: Pulmonary effort is normal. No respiratory distress.   Abdominal:      Palpations: Abdomen is soft.      Tenderness: There is no guarding.   Musculoskeletal:         " General: Normal range of motion.      Cervical back: Normal range of motion and neck supple. Tenderness present. No rigidity.      Thoracic back: Tenderness present.      Lumbar back: Tenderness present.   Skin:     General: Skin is warm and dry.      Coloration: Skin is not jaundiced or pale.   Neurological:      General: No focal deficit present.      Mental Status: She is alert and oriented to person, place, and time. Mental status is at baseline.      Cranial Nerves: No cranial nerve deficit (II-XII).   Psychiatric:         Mood and Affect: Mood normal.         Behavior: Behavior normal. Behavior is cooperative.         Thought Content: Thought content normal.           FL Fluoro for Pain Management  See OP Notes for results.     IMPRESSION: See OP Notes for results.     This procedure was auto-finalized by: Virtual Radiologist         No visits with results within 6 Month(s) from this visit.   Latest known visit with results is:   Office Visit on 09/11/2023   Component Date Value Ref Range Status    POC Amphetamines 09/11/2023 Negative  Negative, Inconclusive Final    POC Barbiturates 09/11/2023 Negative  Negative, Inconclusive Final    POC Benzodiazepines 09/11/2023 Negative  Negative, Inconclusive Final    POC Cocaine 09/11/2023 Negative  Negative, Inconclusive Final    POC THC 09/11/2023 Negative  Negative, Inconclusive Final    POC Methadone 09/11/2023 Negative  Negative, Inconclusive Final    POC Methamphetamine 09/11/2023 Negative  Negative, Inconclusive Final    POC Opiates 09/11/2023 Negative  Negative, Inconclusive Final    POC Oxycodone 09/11/2023 Negative  Negative, Inconclusive Final    POC Phencyclidine 09/11/2023 Negative  Negative, Inconclusive Final    POC Methylenedioxymethamphetamine * 09/11/2023 Negative  Negative, Inconclusive Final    POC Tricyclic Antidepressants 09/11/2023 Negative  Negative, Inconclusive Final    POC Buprenorphine 09/11/2023 Negative   Final     Acceptable  09/11/2023 Yes   Final    POC Temperature (Urine) 09/11/2023 90   Final         Orders Placed This Encounter   Procedures    POCT Urine Drug Screen Presump     Interpretive Information:     Negative:  No drug detected at the cut off level.   Positive:  This result represents presumptive positive for the   tested drug, other substances may yield a positive response other   than the analyte of interest. This result should be utilized for   diagnostic purpose only. Confirmation testing will be performed upon physician request only.            Requested Prescriptions     Signed Prescriptions Disp Refills    traMADoL (ULTRAM) 50 mg tablet 120 tablet 2     Sig: Take 1 tablet (50 mg total) by mouth every 6 (six) hours as needed for Pain.       Assessment:     1. Lumbosacral spondylosis without myelopathy    2. Osteoarthrosis multiple sites, not specified as generalized    3. Cervical radiculopathy    4. Sacroiliitis    5. Encounter for long-term (current) use of other medications           A's of Opioid Risk Assessment  Activity:Patient can perform ADL.   Analgesia:Patients pain is partially controlled by current medication. Patient has tried OTC medications such as Tylenol and Ibuprofen with out relief.   Adverse Effects: Patient denies constipation or sedation.  Aberrant Behavior:  reviewed with no aberrant drug seeking/taking behavior.  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        Plan:    Narcan January 2023      Presumptive drug screen negative, this is expected result, patient takes tramadol, cup does not test for tramadol    Takes gabapentin primary care provider  Celebrex primary care provider       She had bilateral sacroiliac injection # 2, January 30, 2024 she states she had 80% relief after procedure, the procedure did help improve her level of function    She  would like to continue with conservative management current medications she states it is helping with her chronic discomfort    Continue current medication    Continue home exercise program as directed    Follow-up 3 months    Dr. Zambrano January 2025    Bring original prescription medication bottles/container/box with labels to each visit

## 2024-04-17 ENCOUNTER — OFFICE VISIT (OUTPATIENT)
Dept: PAIN MEDICINE | Facility: CLINIC | Age: 54
End: 2024-04-17
Payer: COMMERCIAL

## 2024-04-17 VITALS
WEIGHT: 178 LBS | SYSTOLIC BLOOD PRESSURE: 143 MMHG | RESPIRATION RATE: 16 BRPM | HEIGHT: 63 IN | DIASTOLIC BLOOD PRESSURE: 91 MMHG | HEART RATE: 73 BPM | BODY MASS INDEX: 31.54 KG/M2

## 2024-04-17 DIAGNOSIS — M89.49 OSTEOARTHROSIS MULTIPLE SITES, NOT SPECIFIED AS GENERALIZED: Chronic | ICD-10-CM

## 2024-04-17 DIAGNOSIS — M54.12 CERVICAL RADICULOPATHY: Chronic | ICD-10-CM

## 2024-04-17 DIAGNOSIS — M47.817 LUMBOSACRAL SPONDYLOSIS WITHOUT MYELOPATHY: Primary | Chronic | ICD-10-CM

## 2024-04-17 DIAGNOSIS — Z79.899 ENCOUNTER FOR LONG-TERM (CURRENT) USE OF OTHER MEDICATIONS: ICD-10-CM

## 2024-04-17 DIAGNOSIS — M46.1 SACROILIITIS: Chronic | ICD-10-CM

## 2024-04-17 PROCEDURE — 1159F MED LIST DOCD IN RCRD: CPT | Mod: CPTII,,, | Performed by: PHYSICIAN ASSISTANT

## 2024-04-17 PROCEDURE — 99999PBSHW POCT URINE DRUG SCREEN PRESUMP: Mod: PBBFAC,,,

## 2024-04-17 PROCEDURE — 3077F SYST BP >= 140 MM HG: CPT | Mod: CPTII,,, | Performed by: PHYSICIAN ASSISTANT

## 2024-04-17 PROCEDURE — 80305 DRUG TEST PRSMV DIR OPT OBS: CPT | Mod: PBBFAC | Performed by: PHYSICIAN ASSISTANT

## 2024-04-17 PROCEDURE — 99215 OFFICE O/P EST HI 40 MIN: CPT | Mod: PBBFAC | Performed by: PHYSICIAN ASSISTANT

## 2024-04-17 PROCEDURE — 3080F DIAST BP >= 90 MM HG: CPT | Mod: CPTII,,, | Performed by: PHYSICIAN ASSISTANT

## 2024-04-17 PROCEDURE — 99214 OFFICE O/P EST MOD 30 MIN: CPT | Mod: S$PBB,,, | Performed by: PHYSICIAN ASSISTANT

## 2024-04-17 PROCEDURE — 3008F BODY MASS INDEX DOCD: CPT | Mod: CPTII,,, | Performed by: PHYSICIAN ASSISTANT

## 2024-04-17 RX ORDER — TRAMADOL HYDROCHLORIDE 50 MG/1
50 TABLET ORAL EVERY 6 HOURS PRN
Qty: 120 TABLET | Refills: 2 | Status: SHIPPED | OUTPATIENT
Start: 2024-04-20

## 2024-07-08 NOTE — PROGRESS NOTES
Subjective:         Patient ID: Miriam Winter is a 53 y.o. female.    Chief Complaint: Medication Refill and Follow-up        Pain  This is a chronic problem. The current episode started more than 1 year ago. The problem occurs daily. The problem has been unchanged. Associated symptoms include arthralgias and neck pain. Pertinent negatives include no anorexia, change in bowel habit, chest pain, chills, coughing, diaphoresis, fever, sore throat, vertigo or vomiting.     Review of Systems   Constitutional:  Negative for activity change, appetite change, chills, diaphoresis, fever and unexpected weight change.   HENT:  Negative for drooling, ear discharge, ear pain, facial swelling, nosebleeds, sore throat, trouble swallowing, voice change and goiter.    Eyes:  Negative for photophobia, pain, discharge, redness and visual disturbance.   Respiratory:  Negative for apnea, cough, choking, chest tightness, shortness of breath, wheezing and stridor.    Cardiovascular:  Negative for chest pain, palpitations and leg swelling.   Gastrointestinal:  Negative for abdominal distention, anorexia, change in bowel habit, diarrhea, rectal pain, vomiting and fecal incontinence.   Endocrine: Negative for cold intolerance, heat intolerance, polydipsia, polyphagia and polyuria.   Genitourinary:  Negative for bladder incontinence, dysuria, flank pain, frequency and hot flashes.   Musculoskeletal:  Positive for arthralgias, back pain, leg pain and neck pain.   Integumentary:  Negative for color change and pallor.   Allergic/Immunologic: Negative for immunocompromised state.   Neurological:  Negative for dizziness, vertigo, seizures, syncope, facial asymmetry, speech difficulty, light-headedness, memory loss and coordination difficulties.   Hematological:  Negative for adenopathy. Does not bruise/bleed easily.   Psychiatric/Behavioral:  Negative for agitation, behavioral problems, confusion, decreased concentration, dysphoric mood,  hallucinations, self-injury and suicidal ideas. The patient is not nervous/anxious and is not hyperactive.            Past Medical History:   Diagnosis Date    Asthma     Degenerative lumbar disc     GERD (gastroesophageal reflux disease)     Hyperlipidemia     Hypertension     Low back pain     Sciatica      Past Surgical History:   Procedure Laterality Date    CHOLECYSTECTOMY      HYSTERECTOMY      INJECTION OF ANESTHETIC AGENT AROUND MEDIAL BRANCH NERVES INNERVATING LUMBAR FACET JOINT Bilateral 9/23/2021    Procedure: Bilateral L4-5,5-S1 MBB;  Surgeon: Mabel Zambrano MD;  Location: [facility];  Service: Pain Management;  Laterality: Bilateral;  PT AWARE TO BE TESTED    INJECTION OF ANESTHETIC AGENT AROUND MEDIAL BRANCH NERVES INNERVATING LUMBAR FACET JOINT Bilateral 10/26/2021    Procedure: Block-nerve-medial branch-lumbar, bilateral L4 through S1;  Surgeon: Mabel Zambrano MD;  Location: [facility];  Service: Pain Management;  Laterality: Bilateral;  PT AWARE ON OV TO BE TESTED    INJECTION OF ANESTHETIC AGENT INTO SACROILIAC JOINT Bilateral 11/2/2023    Procedure: BLOCK, SACROILIAC JOINT;  Surgeon: Mabel Zambrano MD;  Location: [facility];  Service: Pain Management;  Laterality: Bilateral;    INJECTION, SACROILIAC JOINT N/A 1/30/2024    Procedure: INJECTION,SACROILIAC JOINT;  Surgeon: Mabel Zambrano MD;  Location: [facility];  Service: Pain Management;  Laterality: N/A;    PARTIAL HYSTERECTOMY      RADIOFREQUENCY ABLATION OF LUMBAR MEDIAL BRANCH NERVE AT SINGLE LEVEL Bilateral 12/9/2021    Procedure: Radiofrequency Ablation, Nerve, Spinal, Lumbar, Medial Branch, 1 Level L4-S1 Right side 1st pre-cert left side in 2 weeks;  Surgeon: Mabel Zambrano MD;  Location: [facility];  Service: Pain Management;  Laterality: Bilateral;  vaccination card scanned in    RADIOFREQUENCY ABLATION OF LUMBAR MEDIAL BRANCH NERVE AT SINGLE LEVEL Left 12/23/2021    Procedure:  "RADIOFREQUENCY ABLATION, NERVE, SPINAL, LUMBAR, MEDIAL BRANCH, 1 LEVEL   LEFT L4-S1 RFTC  (pt already had right side);  Surgeon: Mabel Zambrano MD;  Location: [facility];  Service: Pain Management;  Laterality: Left;  VAC  CARD SCANNED IN    RADIOFREQUENCY ABLATION OF LUMBAR MEDIAL BRANCH NERVE AT SINGLE LEVEL Bilateral 1/17/2023    Procedure: Bilateral L4-5,5-S1 MB RFTC  BOTH SIDES SAME DAY;  Surgeon: Mabel Zambrano MD;  Location: [facility];  Service: Pain Management;  Laterality: Bilateral;    TUBAL LIGATION       Social History     Socioeconomic History    Marital status: Legally    Tobacco Use    Smoking status: Never    Smokeless tobacco: Never   Substance and Sexual Activity    Alcohol use: Yes     Comment: occasionally     Family History   Problem Relation Name Age of Onset    Hypertension Mother      Diabetes Mellitus Mother      Arthritis Mother      Hypertension Father      Diabetes Mellitus Father       Review of patient's allergies indicates:   Allergen Reactions    Pcn [penicillins]         Objective:  Vitals:    07/17/24 1115   BP: (!) 163/94   Pulse: 91   Resp: 14   Weight: 83 kg (183 lb)   Height: 5' 3" (1.6 m)   PainSc: 0-No pain             Physical Exam  Vitals and nursing note reviewed. Exam conducted with a chaperone present.   Constitutional:       General: She is awake. She is not in acute distress.     Appearance: Normal appearance. She is not toxic-appearing or diaphoretic.   HENT:      Head: Normocephalic and atraumatic.      Nose: Nose normal.      Mouth/Throat:      Mouth: Mucous membranes are moist.      Pharynx: Oropharynx is clear.   Eyes:      Conjunctiva/sclera: Conjunctivae normal.      Pupils: Pupils are equal, round, and reactive to light.   Cardiovascular:      Rate and Rhythm: Normal rate.   Pulmonary:      Effort: Pulmonary effort is normal. No respiratory distress.   Abdominal:      Palpations: Abdomen is soft.      Tenderness: There is no " guarding.   Musculoskeletal:         General: Normal range of motion.      Cervical back: Normal range of motion and neck supple. Tenderness present. No rigidity.      Thoracic back: Tenderness present.      Lumbar back: Tenderness present.   Skin:     General: Skin is warm and dry.      Coloration: Skin is not jaundiced or pale.   Neurological:      General: No focal deficit present.      Mental Status: She is alert and oriented to person, place, and time. Mental status is at baseline.      Cranial Nerves: No cranial nerve deficit (II-XII).   Psychiatric:         Mood and Affect: Mood normal.         Behavior: Behavior normal. Behavior is cooperative.         Thought Content: Thought content normal.           FL Fluoro for Pain Management  See OP Notes for results.     IMPRESSION: See OP Notes for results.     This procedure was auto-finalized by: Virtual Radiologist         Office Visit on 04/17/2024   Component Date Value Ref Range Status    POC Amphetamines 04/17/2024 Negative  Negative, Inconclusive Final    POC Barbiturates 04/17/2024 Negative  Negative, Inconclusive Final    POC Benzodiazepines 04/17/2024 Negative  Negative, Inconclusive Final    POC Cocaine 04/17/2024 Negative  Negative, Inconclusive Final    POC THC 04/17/2024 Negative  Negative, Inconclusive Final    POC Methadone 04/17/2024 Negative  Negative, Inconclusive Final    POC Methamphetamine 04/17/2024 Negative  Negative, Inconclusive Final    POC Opiates 04/17/2024 Negative  Negative, Inconclusive Final    POC Oxycodone 04/17/2024 Negative  Negative, Inconclusive Final    POC Phencyclidine 04/17/2024 Negative  Negative, Inconclusive Final    POC Methylenedioxymethamphetamine * 04/17/2024 Negative  Negative, Inconclusive Final    POC Tricyclic Antidepressants 04/17/2024 Negative  Negative, Inconclusive Final    POC Buprenorphine 04/17/2024 Negative   Final     Acceptable 04/17/2024 Yes   Final    POC Temperature (Urine) 04/17/2024  90   Final         Orders Placed This Encounter   Procedures    Case Request Operating Room: RADIOFREQUENCY THERMAL COAGULATION SI     Order Specific Question:   Medical Necessity:     Answer:   Medically Non-Urgent [100]     Order Specific Question:   CPT Code:     Answer:   MA ABLATION, RADIOFREQ, SACROILIAC JOINT, W/IMG [16353]     Order Specific Question:   Case classification     Answer:   E - Elective [90]     Order Specific Question:   Is an on-site pathologist required for this procedure?     Answer:   N/A         Requested Prescriptions     Signed Prescriptions Disp Refills    traMADoL (ULTRAM) 50 mg tablet 120 tablet 0     Sig: Take 1 tablet (50 mg total) by mouth every 6 (six) hours as needed for Pain.       Assessment:     1. Sacroiliitis    2. Osteoarthrosis multiple sites, not specified as generalized    3. Cervical radiculopathy    4. Lumbosacral spondylosis without myelopathy             A's of Opioid Risk Assessment  Activity:Patient can perform ADL.   Analgesia:Patients pain is partially controlled by current medication. Patient has tried OTC medications such as Tylenol and Ibuprofen with out relief.   Adverse Effects: Patient denies constipation or sedation.  Aberrant Behavior:  reviewed with no aberrant drug seeking/taking behavior.  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        Plan:    Narcan January 2023      Presumptive drug screen negative, this is expected result, patient takes tramadol, cup does not test for tramadol    Takes gabapentin primary care provider  Celebrex primary care provider       She had bilateral sacroiliac injection # 2, January 30, 2024 she states she had 80% relief after procedure, the procedure did help improve her level of function    Today requesting sacroiliac procedure for sacroiliac discomfort     She states her  sacroiliac discomfort spine ongoing for more than 3 months    Continue current medication    Continue home exercise program as directed    Tender bilateral sacroiliac area  Bilateral sacroiliac compression test positive  Giselle's /James's positive bilateral  Gaenslen positive bilateral  procedure done prior to surgical consideration    Ongoing Physical therapy/home exercise program as directed no longer controlling discomfort    Monitor anesthesia request is medically indicated for the scheduled nerve block procedure due to:  1- needle phobia and anxiety, placing  the patient at risk during the provided service.  2-patient has an ASA class greater than 3 and requires constant presence of an anesthesiologist during the procedure,   3-patient has severe problems hard to lie still  4-patient suffers from chronic pain and is unable to function due to  diminished ADLs    Patient's pain medication no longer helping control discomfort    Schedule bilateral sacroiliac RF TC, sacroiliitis    Dr. Zambrano     Bring original prescription medication bottles/container/box with labels to each visit

## 2024-07-17 ENCOUNTER — OFFICE VISIT (OUTPATIENT)
Dept: PAIN MEDICINE | Facility: CLINIC | Age: 54
End: 2024-07-17
Payer: COMMERCIAL

## 2024-07-17 VITALS
HEART RATE: 91 BPM | WEIGHT: 183 LBS | BODY MASS INDEX: 32.43 KG/M2 | HEIGHT: 63 IN | DIASTOLIC BLOOD PRESSURE: 94 MMHG | RESPIRATION RATE: 14 BRPM | SYSTOLIC BLOOD PRESSURE: 163 MMHG

## 2024-07-17 DIAGNOSIS — M89.49 OSTEOARTHROSIS MULTIPLE SITES, NOT SPECIFIED AS GENERALIZED: Chronic | ICD-10-CM

## 2024-07-17 DIAGNOSIS — M54.12 CERVICAL RADICULOPATHY: Chronic | ICD-10-CM

## 2024-07-17 DIAGNOSIS — Z79.899 ENCOUNTER FOR LONG-TERM (CURRENT) USE OF OTHER MEDICATIONS: ICD-10-CM

## 2024-07-17 DIAGNOSIS — M47.817 LUMBOSACRAL SPONDYLOSIS WITHOUT MYELOPATHY: Chronic | ICD-10-CM

## 2024-07-17 DIAGNOSIS — M46.1 SACROILIITIS: Primary | Chronic | ICD-10-CM

## 2024-07-17 LAB

## 2024-07-17 PROCEDURE — 3080F DIAST BP >= 90 MM HG: CPT | Mod: CPTII,,, | Performed by: PHYSICIAN ASSISTANT

## 2024-07-17 PROCEDURE — 99999 PR PBB SHADOW E&M-EST. PATIENT-LVL V: CPT | Mod: PBBFAC,,, | Performed by: PHYSICIAN ASSISTANT

## 2024-07-17 PROCEDURE — 99214 OFFICE O/P EST MOD 30 MIN: CPT | Mod: S$PBB,,, | Performed by: PHYSICIAN ASSISTANT

## 2024-07-17 PROCEDURE — 3008F BODY MASS INDEX DOCD: CPT | Mod: CPTII,,, | Performed by: PHYSICIAN ASSISTANT

## 2024-07-17 PROCEDURE — 80305 DRUG TEST PRSMV DIR OPT OBS: CPT | Mod: PBBFAC | Performed by: PHYSICIAN ASSISTANT

## 2024-07-17 PROCEDURE — 99999PBSHW POCT URINE DRUG SCREEN PRESUMP: Mod: PBBFAC,,,

## 2024-07-17 PROCEDURE — 99215 OFFICE O/P EST HI 40 MIN: CPT | Mod: PBBFAC | Performed by: PHYSICIAN ASSISTANT

## 2024-07-17 PROCEDURE — 1159F MED LIST DOCD IN RCRD: CPT | Mod: CPTII,,, | Performed by: PHYSICIAN ASSISTANT

## 2024-07-17 PROCEDURE — 3077F SYST BP >= 140 MM HG: CPT | Mod: CPTII,,, | Performed by: PHYSICIAN ASSISTANT

## 2024-07-17 RX ORDER — TRAMADOL HYDROCHLORIDE 50 MG/1
50 TABLET ORAL EVERY 6 HOURS PRN
Qty: 120 TABLET | Refills: 0 | Status: SHIPPED | OUTPATIENT
Start: 2024-07-20

## 2024-07-17 NOTE — PATIENT INSTRUCTIONS
Bilateral SI Joint RFTC scheduled for Aug 1, 2024 @ 915am    Procedure Instructions:    Nothing to eat or drink for 8 hours or after midnight including gum, candy, mints, or tobacco products.  If you are scheduled for 1:30 or later nothing to eat or drink after 5 a.m. the morning of the procedure, including gum, candy, mints, or tobacco products.  Must have a  at least 18 yrs of age to stay present at all times  No Diabetic medications the morning of procedure, check blood sugar the morning of procedure, if it is greater than 200 call the office at [phone]  If you are started on antibiotics or have been prescribed antibiotics, have a fever, or have any other type of infection call to reschedule procedure.  If you take blood pressure medications you can take it at your regular scheduled time with a small sip of WATER!  Dentures are to be removed prior to procedure or we can provide you with a denture cup to remove them prior to being taken back for procedure.   False eyelashes are to be removed before the morning of procedure.    Contacts will have to be removed prior to procedure.  No jewelry is to be worn to the procedure.     HOLD ASPIRIN AND ASPIRIN PRODUCTS  (ASPIRIN, BC POWDER ETC. ) FOR 7 DAYS  PRIOR TO PROCEDURE  HOLD NSAIDS( ibuprofen, mobic, meloxicam, advil, diclofenac, naproxen, relafen, celebrex,  methotrexate, aleve etc....)  FOR 3 DAYS   PRIOR TO PROCEDURE

## 2024-08-01 ENCOUNTER — ANESTHESIA (OUTPATIENT)
Dept: PAIN MEDICINE | Facility: HOSPITAL | Age: 54
End: 2024-08-01
Payer: COMMERCIAL

## 2024-08-01 ENCOUNTER — HOSPITAL ENCOUNTER (OUTPATIENT)
Facility: HOSPITAL | Age: 54
Discharge: HOME | End: 2024-08-01
Attending: PAIN MEDICINE | Admitting: PAIN MEDICINE
Payer: COMMERCIAL

## 2024-08-01 ENCOUNTER — ANESTHESIA EVENT (OUTPATIENT)
Dept: PAIN MEDICINE | Facility: HOSPITAL | Age: 54
End: 2024-08-01
Payer: COMMERCIAL

## 2024-08-01 VITALS
BODY MASS INDEX: 32 KG/M2 | SYSTOLIC BLOOD PRESSURE: 144 MMHG | RESPIRATION RATE: 12 BRPM | HEART RATE: 74 BPM | TEMPERATURE: 98 F | DIASTOLIC BLOOD PRESSURE: 93 MMHG | OXYGEN SATURATION: 100 % | HEIGHT: 63 IN | WEIGHT: 180.63 LBS

## 2024-08-01 DIAGNOSIS — M46.1 SACROILIITIS: Primary | Chronic | ICD-10-CM

## 2024-08-01 DIAGNOSIS — M46.1 BILATERAL SACROILIITIS: ICD-10-CM

## 2024-08-01 PROCEDURE — 25000003 PHARM REV CODE 250: Performed by: NURSE ANESTHETIST, CERTIFIED REGISTERED

## 2024-08-01 PROCEDURE — 25000003 PHARM REV CODE 250: Performed by: PAIN MEDICINE

## 2024-08-01 PROCEDURE — 27000716 HC OXISENSOR PROBE, ANY SIZE: Performed by: NURSE ANESTHETIST, CERTIFIED REGISTERED

## 2024-08-01 PROCEDURE — 63600175 PHARM REV CODE 636 W HCPCS: Performed by: PAIN MEDICINE

## 2024-08-01 PROCEDURE — 63600175 PHARM REV CODE 636 W HCPCS: Performed by: NURSE ANESTHETIST, CERTIFIED REGISTERED

## 2024-08-01 PROCEDURE — 37000008 HC ANESTHESIA 1ST 15 MINUTES: Performed by: PAIN MEDICINE

## 2024-08-01 PROCEDURE — 27201423 OPTIME MED/SURG SUP & DEVICES STERILE SUPPLY: Performed by: PAIN MEDICINE

## 2024-08-01 PROCEDURE — 64625 RF ABLTJ NRV NRVTG SI JT: CPT | Mod: 50,,, | Performed by: PAIN MEDICINE

## 2024-08-01 PROCEDURE — 64625 RF ABLTJ NRV NRVTG SI JT: CPT | Mod: 50 | Performed by: PAIN MEDICINE

## 2024-08-01 PROCEDURE — 37000009 HC ANESTHESIA EA ADD 15 MINS: Performed by: PAIN MEDICINE

## 2024-08-01 PROCEDURE — 27000284 HC CANNULA NASAL: Performed by: NURSE ANESTHETIST, CERTIFIED REGISTERED

## 2024-08-01 RX ORDER — LIDOCAINE HYDROCHLORIDE 20 MG/ML
INJECTION, SOLUTION EPIDURAL; INFILTRATION; INTRACAUDAL; PERINEURAL
Status: DISCONTINUED | OUTPATIENT
Start: 2024-08-01 | End: 2024-08-01

## 2024-08-01 RX ORDER — TRIAMCINOLONE ACETONIDE 40 MG/ML
INJECTION, SUSPENSION INTRA-ARTICULAR; INTRAMUSCULAR CODE/TRAUMA/SEDATION MEDICATION
Status: DISCONTINUED | OUTPATIENT
Start: 2024-08-01 | End: 2024-08-01 | Stop reason: HOSPADM

## 2024-08-01 RX ORDER — SODIUM CHLORIDE 9 MG/ML
INJECTION, SOLUTION INTRAVENOUS CONTINUOUS
Status: DISCONTINUED | OUTPATIENT
Start: 2024-08-01 | End: 2024-08-01 | Stop reason: HOSPADM

## 2024-08-01 RX ORDER — METHOCARBAMOL 500 MG/1
TABLET, FILM COATED ORAL
Status: DISCONTINUED | OUTPATIENT
Start: 2024-08-01 | End: 2024-08-01

## 2024-08-01 RX ORDER — FENTANYL CITRATE 50 UG/ML
INJECTION, SOLUTION INTRAMUSCULAR; INTRAVENOUS
Status: DISCONTINUED | OUTPATIENT
Start: 2024-08-01 | End: 2024-08-01

## 2024-08-01 RX ORDER — BUPIVACAINE HYDROCHLORIDE 2.5 MG/ML
INJECTION, SOLUTION EPIDURAL; INFILTRATION; INTRACAUDAL CODE/TRAUMA/SEDATION MEDICATION
Status: DISCONTINUED | OUTPATIENT
Start: 2024-08-01 | End: 2024-08-01 | Stop reason: HOSPADM

## 2024-08-01 RX ORDER — PROPOFOL 10 MG/ML
VIAL (ML) INTRAVENOUS
Status: DISCONTINUED | OUTPATIENT
Start: 2024-08-01 | End: 2024-08-01

## 2024-08-01 RX ADMIN — PROPOFOL 100 MG: 10 INJECTION, EMULSION INTRAVENOUS at 09:08

## 2024-08-01 RX ADMIN — SODIUM CHLORIDE: 9 INJECTION, SOLUTION INTRAVENOUS at 09:08

## 2024-08-01 RX ADMIN — PROPOFOL 100 MG: 10 INJECTION, EMULSION INTRAVENOUS at 10:08

## 2024-08-01 RX ADMIN — LIDOCAINE HYDROCHLORIDE 50 MG: 20 INJECTION, SOLUTION EPIDURAL; INFILTRATION; INTRACAUDAL; PERINEURAL at 09:08

## 2024-08-01 RX ADMIN — METHOCARBAMOL TABLETS 500 MG: 500 TABLET, COATED ORAL at 09:08

## 2024-08-01 RX ADMIN — FENTANYL CITRATE 100 MCG: 50 INJECTION INTRAMUSCULAR; INTRAVENOUS at 09:08

## 2024-08-01 NOTE — ANESTHESIA POSTPROCEDURE EVALUATION
Anesthesia Post Evaluation    Patient: Miriam Winter    Procedure(s) Performed: Procedure(s) (LRB):  RADIOFREQUENCY THERMAL COAGULATION SI (Bilateral)    Final Anesthesia Type: MAC      Patient location: [facility].  Patient participation: Yes- Able to Participate  Level of consciousness: awake and alert  Post-procedure vital signs: reviewed and stable  Pain management: adequate  Airway patency: patent    PONV status at discharge: No PONV  Anesthetic complications: no      Cardiovascular status: blood pressure returned to baseline, hemodynamically stable and stable  Respiratory status: unassisted  Hydration status: euvolemic  Follow-up not needed.  Comments: Pt voices appreciation for care              Vitals Value Taken Time   /90 08/01/24 1100   Temp 97.7 08/01/24 1452   Pulse 72 08/01/24 1100   Resp 8 08/01/24 1100   SpO2 100 % 08/01/24 1100   Vitals shown include unfiled device data.      Event Time   Out of Recovery 11:00:00         Pain/Farzad Score: Farzad Score: 10 (8/1/2024 10:55 AM)

## 2024-08-01 NOTE — TRANSFER OF CARE
"Anesthesia Transfer of Care Note    Patient: Miriam Winter    Procedure(s) Performed: Procedure(s) (LRB):  RADIOFREQUENCY THERMAL COAGULATION SI (Bilateral)    Patient location: GI    Anesthesia Type: MAC    Transport from OR: Transported from OR on room air with adequate spontaneous ventilation. Continuous ECG monitoring in transport. Continuous SpO2 monitoring in transport    Post pain: adequate analgesia    Post assessment: no apparent anesthetic complications    Post vital signs: stable    Level of consciousness: sedated and responds to stimulation    Nausea/Vomiting: no nausea/vomiting    Complications: none    Transfer of care protocol was followedComments: Good SV continue, NAD, VSS, RTRN      Last vitals: Visit Vitals  BP 99/71 (BP Location: Left forearm, Patient Position: Lying)   Pulse 79   Temp 36.5 °C (97.7 °F) (Oral)   Resp 16   Ht 5' 3" (1.6 m)   Wt 81.9 kg (180 lb 9.6 oz)   SpO2 98%   BMI 31.99 kg/m²     "

## 2024-08-01 NOTE — ANESTHESIA PREPROCEDURE EVALUATION
08/01/2024  Miriam Winter is a 53 y.o., female.      Pre-op Assessment    I have reviewed the Patient Summary Reports.     I have reviewed the Nursing Notes. I have reviewed the NPO Status.   I have reviewed the Medications.     Review of Systems  Anesthesia Hx:  No problems with previous Anesthesia                Social:  Non-Smoker, Alcohol Use       Cardiovascular:     Hypertension, well controlled                                        Pulmonary:    Asthma mild                   Hepatic/GI:     GERD, well controlled             Musculoskeletal:  Arthritis          Spine Disorders: cervical and lumbar Chronic Pain           Neurological:    Neuromuscular Disease,                                   Endocrine:        Obesity / BMI > 30      Physical Exam  General: Well nourished, Cooperative, Alert and Oriented    Airway:  Mallampati: II   Mouth Opening: Normal  TM Distance: Normal  Tongue: Normal  Neck ROM: Normal ROM    Dental:  Intact        Anesthesia Plan  Type of Anesthesia, risks & benefits discussed:    Anesthesia Type: Gen Natural Airway, MAC  Intra-op Monitoring Plan: Standard ASA Monitors  Post Op Pain Control Plan: multimodal analgesia and IV/PO Opioids PRN  Induction:  IV  Informed Consent: Informed consent signed with the Patient and all parties understand the risks and agree with anesthesia plan.  All questions answered. Patient consented to blood products? Yes  ASA Score: 3  Day of Surgery Review of History & Physical: I have interviewed and examined the patient. I have reviewed the patient's H&P dated: There are no significant changes.     Ready For Surgery From Anesthesia Perspective.     .  Past Medical History:   Diagnosis Date    Asthma     Degenerative lumbar disc     GERD (gastroesophageal reflux disease)     Hyperlipidemia     Hypertension     Low back pain     Sciatica        Past  Surgical History:   Procedure Laterality Date    CHOLECYSTECTOMY      HYSTERECTOMY      INJECTION OF ANESTHETIC AGENT AROUND MEDIAL BRANCH NERVES INNERVATING LUMBAR FACET JOINT Bilateral 9/23/2021    Procedure: Bilateral L4-5,5-S1 MBB;  Surgeon: Mabel Zambrano MD;  Location: [facility];  Service: Pain Management;  Laterality: Bilateral;  PT AWARE TO BE TESTED    INJECTION OF ANESTHETIC AGENT AROUND MEDIAL BRANCH NERVES INNERVATING LUMBAR FACET JOINT Bilateral 10/26/2021    Procedure: Block-nerve-medial branch-lumbar, bilateral L4 through S1;  Surgeon: Mabel Zambrano MD;  Location: [facility];  Service: Pain Management;  Laterality: Bilateral;  PT AWARE ON OV TO BE TESTED    INJECTION OF ANESTHETIC AGENT INTO SACROILIAC JOINT Bilateral 11/2/2023    Procedure: BLOCK, SACROILIAC JOINT;  Surgeon: Mabel Zambrano MD;  Location: [facility];  Service: Pain Management;  Laterality: Bilateral;    INJECTION, SACROILIAC JOINT N/A 1/30/2024    Procedure: INJECTION,SACROILIAC JOINT;  Surgeon: Mabel Zambrano MD;  Location: [facility];  Service: Pain Management;  Laterality: N/A;    PARTIAL HYSTERECTOMY      RADIOFREQUENCY ABLATION OF LUMBAR MEDIAL BRANCH NERVE AT SINGLE LEVEL Bilateral 12/9/2021    Procedure: Radiofrequency Ablation, Nerve, Spinal, Lumbar, Medial Branch, 1 Level L4-S1 Right side 1st pre-cert left side in 2 weeks;  Surgeon: Mabel Zambrano MD;  Location: [facility];  Service: Pain Management;  Laterality: Bilateral;  vaccination card scanned in    RADIOFREQUENCY ABLATION OF LUMBAR MEDIAL BRANCH NERVE AT SINGLE LEVEL Left 12/23/2021    Procedure: RADIOFREQUENCY ABLATION, NERVE, SPINAL, LUMBAR, MEDIAL BRANCH, 1 LEVEL   LEFT L4-S1 RFTC  (pt already had right side);  Surgeon: Mabel Zambrano MD;  Location: [facility];  Service: Pain Management;  Laterality: Left;  VAC  CARD SCANNED IN    RADIOFREQUENCY ABLATION OF LUMBAR MEDIAL BRANCH NERVE AT SINGLE LEVEL  Bilateral 1/17/2023    Procedure: Bilateral L4-5,5-S1 MB RFTC  BOTH SIDES SAME DAY;  Surgeon: Mabel Zambrano MD;  Location: [facility];  Service: Pain Management;  Laterality: Bilateral;    TUBAL LIGATION         Family History   Problem Relation Name Age of Onset    Hypertension Mother      Diabetes Mellitus Mother      Arthritis Mother      Hypertension Father      Diabetes Mellitus Father         Social History     Socioeconomic History    Marital status: Legally    Tobacco Use    Smoking status: Never    Smokeless tobacco: Never   Substance and Sexual Activity    Alcohol use: Yes     Comment: occasionally       Current Facility-Administered Medications   Medication Dose Route Frequency Provider Last Rate Last Admin    0.9%  NaCl infusion   Intravenous Continuous Mabel Zambrano MD         Facility-Administered Medications Ordered in Other Encounters   Medication Dose Route Frequency Provider Last Rate Last Admin    0.9%  NaCl infusion   Intravenous Continuous Mabel Zambrano MD   New Bag at 01/17/23 1432       Review of patient's allergies indicates:   Allergen Reactions    Pcn [penicillins]

## 2024-08-07 NOTE — PROGRESS NOTES
Subjective:         Patient ID: Miriam Winter is a 53 y.o. female.    Chief Complaint: Follow-up        Pain  This is a chronic problem. The current episode started more than 1 year ago. The problem occurs daily. The problem has been gradually improving. Associated symptoms include arthralgias and neck pain. Pertinent negatives include no anorexia, change in bowel habit, chest pain, chills, coughing, diaphoresis, fever, sore throat, vertigo or vomiting.     Review of Systems   Constitutional:  Negative for activity change, appetite change, chills, diaphoresis, fever and unexpected weight change.   HENT:  Negative for drooling, ear discharge, ear pain, facial swelling, nosebleeds, sore throat, trouble swallowing, voice change and goiter.    Eyes:  Negative for photophobia, pain, discharge, redness and visual disturbance.   Respiratory:  Negative for apnea, cough, choking, chest tightness, shortness of breath, wheezing and stridor.    Cardiovascular:  Negative for chest pain, palpitations and leg swelling.   Gastrointestinal:  Negative for abdominal distention, anorexia, change in bowel habit, diarrhea, rectal pain, vomiting and fecal incontinence.   Endocrine: Negative for cold intolerance, heat intolerance, polydipsia, polyphagia and polyuria.   Genitourinary:  Negative for bladder incontinence, dysuria, flank pain, frequency and hot flashes.   Musculoskeletal:  Positive for arthralgias, back pain, leg pain and neck pain.   Integumentary:  Negative for color change and pallor.   Allergic/Immunologic: Negative for immunocompromised state.   Neurological:  Negative for dizziness, vertigo, seizures, syncope, facial asymmetry, speech difficulty, light-headedness, memory loss and coordination difficulties.   Hematological:  Negative for adenopathy. Does not bruise/bleed easily.   Psychiatric/Behavioral:  Negative for agitation, behavioral problems, confusion, decreased concentration, dysphoric mood, hallucinations,  self-injury and suicidal ideas. The patient is not nervous/anxious and is not hyperactive.            Past Medical History:   Diagnosis Date    Asthma     Degenerative lumbar disc     GERD (gastroesophageal reflux disease)     Hyperlipidemia     Hypertension     Low back pain     Sciatica      Past Surgical History:   Procedure Laterality Date    CHOLECYSTECTOMY      HYSTERECTOMY      INJECTION OF ANESTHETIC AGENT AROUND MEDIAL BRANCH NERVES INNERVATING LUMBAR FACET JOINT Bilateral 9/23/2021    Procedure: Bilateral L4-5,5-S1 MBB;  Surgeon: Mabel Zambrano MD;  Location: [facility];  Service: Pain Management;  Laterality: Bilateral;  PT AWARE TO BE TESTED    INJECTION OF ANESTHETIC AGENT AROUND MEDIAL BRANCH NERVES INNERVATING LUMBAR FACET JOINT Bilateral 10/26/2021    Procedure: Block-nerve-medial branch-lumbar, bilateral L4 through S1;  Surgeon: Mabel Zambrano MD;  Location: [facility];  Service: Pain Management;  Laterality: Bilateral;  PT AWARE ON OV TO BE TESTED    INJECTION OF ANESTHETIC AGENT INTO SACROILIAC JOINT Bilateral 11/2/2023    Procedure: BLOCK, SACROILIAC JOINT;  Surgeon: Mabel Zambrano MD;  Location: [facility];  Service: Pain Management;  Laterality: Bilateral;    INJECTION, SACROILIAC JOINT N/A 1/30/2024    Procedure: INJECTION,SACROILIAC JOINT;  Surgeon: Mabel Zambrano MD;  Location: [facility];  Service: Pain Management;  Laterality: N/A;    PARTIAL HYSTERECTOMY      RADIOFREQUENCY ABLATION OF LUMBAR MEDIAL BRANCH NERVE AT SINGLE LEVEL Bilateral 12/9/2021    Procedure: Radiofrequency Ablation, Nerve, Spinal, Lumbar, Medial Branch, 1 Level L4-S1 Right side 1st pre-cert left side in 2 weeks;  Surgeon: Mabel Zambrano MD;  Location: [facility];  Service: Pain Management;  Laterality: Bilateral;  vaccination card scanned in    RADIOFREQUENCY ABLATION OF LUMBAR MEDIAL BRANCH NERVE AT SINGLE LEVEL Left 12/23/2021    Procedure: RADIOFREQUENCY ABLATION,  "NERVE, SPINAL, LUMBAR, MEDIAL BRANCH, 1 LEVEL   LEFT L4-S1 RFTC  (pt already had right side);  Surgeon: Mabel Zambrano MD;  Location: [facility];  Service: Pain Management;  Laterality: Left;  VAC  CARD SCANNED IN    RADIOFREQUENCY ABLATION OF LUMBAR MEDIAL BRANCH NERVE AT SINGLE LEVEL Bilateral 1/17/2023    Procedure: Bilateral L4-5,5-S1 MB RFTC  BOTH SIDES SAME DAY;  Surgeon: Mabel Zambrano MD;  Location: [facility];  Service: Pain Management;  Laterality: Bilateral;    RADIOFREQUENCY THERMOCOAGULATION Bilateral 8/1/2024    Procedure: RADIOFREQUENCY THERMAL COAGULATION SI;  Surgeon: Mabel Zambrano MD;  Location: [facility];  Service: Pain Management;  Laterality: Bilateral;    TUBAL LIGATION       Social History     Socioeconomic History    Marital status: Legally    Tobacco Use    Smoking status: Never    Smokeless tobacco: Never   Substance and Sexual Activity    Alcohol use: Yes     Comment: occasionally     Family History   Problem Relation Name Age of Onset    Hypertension Mother      Diabetes Mellitus Mother      Arthritis Mother      Hypertension Father      Diabetes Mellitus Father       Review of patient's allergies indicates:   Allergen Reactions    Pcn [penicillins]         Objective:  Vitals:    08/20/24 0833   BP: (!) 142/84   Pulse: 77   Resp: 16   Weight: 81.6 kg (180 lb)   Height: 5' 3" (1.6 m)   PainSc: 0-No pain               Physical Exam  Vitals and nursing note reviewed. Exam conducted with a chaperone present.   Constitutional:       General: She is awake. She is not in acute distress.     Appearance: Normal appearance. She is not toxic-appearing or diaphoretic.   HENT:      Head: Normocephalic and atraumatic.      Nose: Nose normal.      Mouth/Throat:      Mouth: Mucous membranes are moist.      Pharynx: Oropharynx is clear.   Eyes:      Conjunctiva/sclera: Conjunctivae normal.      Pupils: Pupils are equal, round, and reactive to light. "   Cardiovascular:      Rate and Rhythm: Normal rate.   Pulmonary:      Effort: Pulmonary effort is normal. No respiratory distress.   Abdominal:      Palpations: Abdomen is soft.      Tenderness: There is no guarding.   Musculoskeletal:         General: Normal range of motion.      Cervical back: Normal range of motion and neck supple. Tenderness present. No rigidity.      Thoracic back: Tenderness present.      Lumbar back: Tenderness present.   Skin:     General: Skin is warm and dry.      Coloration: Skin is not jaundiced or pale.   Neurological:      General: No focal deficit present.      Mental Status: She is alert and oriented to person, place, and time. Mental status is at baseline.      Cranial Nerves: No cranial nerve deficit (II-XII).   Psychiatric:         Mood and Affect: Mood normal.         Behavior: Behavior normal. Behavior is cooperative.         Thought Content: Thought content normal.           FL Fluoro for Pain Management  See OP Notes for results.     IMPRESSION: See OP Notes for results.     This procedure was auto-finalized by: Virtual Radiologist         Office Visit on 07/17/2024   Component Date Value Ref Range Status    POC Amphetamines 07/17/2024 Negative  Negative, Inconclusive Final    POC Barbiturates 07/17/2024 Negative  Negative, Inconclusive Final    POC Benzodiazepines 07/17/2024 Negative  Negative, Inconclusive Final    POC Cocaine 07/17/2024 Negative  Negative, Inconclusive Final    POC THC 07/17/2024 Negative  Negative, Inconclusive Final    POC Methadone 07/17/2024 Negative  Negative, Inconclusive Final    POC Methamphetamine 07/17/2024 Negative  Negative, Inconclusive Final    POC Opiates 07/17/2024 Negative  Negative, Inconclusive Final    POC Oxycodone 07/17/2024 Negative  Negative, Inconclusive Final    POC Phencyclidine 07/17/2024 Negative  Negative, Inconclusive Final    POC Methylenedioxymethamphetamine * 07/17/2024 Negative  Negative, Inconclusive Final    POC  Tricyclic Antidepressants 07/17/2024 Negative  Negative, Inconclusive Final    POC Buprenorphine 07/17/2024 Negative   Final     Acceptable 07/17/2024 Yes   Final    POC Temperature (Urine) 07/17/2024 92   Final   Office Visit on 04/17/2024   Component Date Value Ref Range Status    POC Amphetamines 04/17/2024 Negative  Negative, Inconclusive Final    POC Barbiturates 04/17/2024 Negative  Negative, Inconclusive Final    POC Benzodiazepines 04/17/2024 Negative  Negative, Inconclusive Final    POC Cocaine 04/17/2024 Negative  Negative, Inconclusive Final    POC THC 04/17/2024 Negative  Negative, Inconclusive Final    POC Methadone 04/17/2024 Negative  Negative, Inconclusive Final    POC Methamphetamine 04/17/2024 Negative  Negative, Inconclusive Final    POC Opiates 04/17/2024 Negative  Negative, Inconclusive Final    POC Oxycodone 04/17/2024 Negative  Negative, Inconclusive Final    POC Phencyclidine 04/17/2024 Negative  Negative, Inconclusive Final    POC Methylenedioxymethamphetamine * 04/17/2024 Negative  Negative, Inconclusive Final    POC Tricyclic Antidepressants 04/17/2024 Negative  Negative, Inconclusive Final    POC Buprenorphine 04/17/2024 Negative   Final     Acceptable 04/17/2024 Yes   Final    POC Temperature (Urine) 04/17/2024 90   Final         No orders of the defined types were placed in this encounter.        Requested Prescriptions     Signed Prescriptions Disp Refills    traMADoL (ULTRAM) 50 mg tablet 120 tablet 2     Sig: Take 1 tablet (50 mg total) by mouth every 6 (six) hours as needed for Pain.       Assessment:     1. Lumbosacral spondylosis without myelopathy    2. Osteoarthrosis multiple sites, not specified as generalized    3. Cervical radiculopathy    4. Sacroiliitis               A's of Opioid Risk Assessment  Activity:Patient can perform ADL.   Analgesia:Patients pain is partially controlled by current medication. Patient has tried OTC medications such as  Tylenol and Ibuprofen with out relief.   Adverse Effects: Patient denies constipation or sedation.  Aberrant Behavior:  reviewed with no aberrant drug seeking/taking behavior.  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        Plan:    Narcan January 2023      Presumptive drug screen negative, this is expected result, patient takes tramadol, cup does not test for tramadol    Takes gabapentin primary care provider  [employer] primary care provider       Follow-up after bilateral sacroiliac RFTC August 1, 2024   she states she had 80% relief after procedure, the procedure did help improve her level of function    She would like to continue conservative management this time    Continue current medication    Continue home exercise program as directed    Follow-up 3 months    Dr. Zambrano August 2025    Bring original prescription medication bottles/container/box with labels to each visit

## 2024-08-20 ENCOUNTER — OFFICE VISIT (OUTPATIENT)
Dept: PAIN MEDICINE | Facility: CLINIC | Age: 54
End: 2024-08-20
Payer: COMMERCIAL

## 2024-08-20 VITALS
SYSTOLIC BLOOD PRESSURE: 142 MMHG | DIASTOLIC BLOOD PRESSURE: 84 MMHG | RESPIRATION RATE: 16 BRPM | WEIGHT: 180 LBS | BODY MASS INDEX: 31.89 KG/M2 | HEIGHT: 63 IN | HEART RATE: 77 BPM

## 2024-08-20 DIAGNOSIS — M46.1 SACROILIITIS: Chronic | ICD-10-CM

## 2024-08-20 DIAGNOSIS — M47.817 LUMBOSACRAL SPONDYLOSIS WITHOUT MYELOPATHY: Primary | Chronic | ICD-10-CM

## 2024-08-20 DIAGNOSIS — M89.49 OSTEOARTHROSIS MULTIPLE SITES, NOT SPECIFIED AS GENERALIZED: Chronic | ICD-10-CM

## 2024-08-20 DIAGNOSIS — M54.12 CERVICAL RADICULOPATHY: Chronic | ICD-10-CM

## 2024-08-20 PROCEDURE — 1159F MED LIST DOCD IN RCRD: CPT | Mod: CPTII,,, | Performed by: PHYSICIAN ASSISTANT

## 2024-08-20 PROCEDURE — 99214 OFFICE O/P EST MOD 30 MIN: CPT | Mod: S$PBB,,, | Performed by: PHYSICIAN ASSISTANT

## 2024-08-20 PROCEDURE — 3077F SYST BP >= 140 MM HG: CPT | Mod: CPTII,,, | Performed by: PHYSICIAN ASSISTANT

## 2024-08-20 PROCEDURE — 99999 PR PBB SHADOW E&M-EST. PATIENT-LVL V: CPT | Mod: PBBFAC,,, | Performed by: PHYSICIAN ASSISTANT

## 2024-08-20 PROCEDURE — 3079F DIAST BP 80-89 MM HG: CPT | Mod: CPTII,,, | Performed by: PHYSICIAN ASSISTANT

## 2024-08-20 PROCEDURE — 3008F BODY MASS INDEX DOCD: CPT | Mod: CPTII,,, | Performed by: PHYSICIAN ASSISTANT

## 2024-08-20 PROCEDURE — 99215 OFFICE O/P EST HI 40 MIN: CPT | Mod: PBBFAC | Performed by: PHYSICIAN ASSISTANT

## 2024-08-20 RX ORDER — TRAMADOL HYDROCHLORIDE 50 MG/1
50 TABLET ORAL EVERY 6 HOURS PRN
Qty: 120 TABLET | Refills: 2 | Status: SHIPPED | OUTPATIENT
Start: 2024-08-20

## 2024-11-07 NOTE — PROGRESS NOTES
Subjective:         Patient ID: Miriam Winter is a 53 y.o. female.    Chief Complaint: Low-back Pain        Pain  This is a chronic problem. The current episode started more than 1 year ago. The problem occurs daily. The problem has been waxing and waning. Associated symptoms include arthralgias and neck pain. Pertinent negatives include no anorexia, change in bowel habit, chest pain, chills, coughing, diaphoresis, fever, sore throat, vertigo or vomiting.     Review of Systems   Constitutional:  Negative for activity change, appetite change, chills, diaphoresis, fever and unexpected weight change.   HENT:  Negative for drooling, ear discharge, ear pain, facial swelling, nosebleeds, sore throat, trouble swallowing, voice change and goiter.    Eyes:  Negative for photophobia, pain, discharge, redness and visual disturbance.   Respiratory:  Negative for apnea, cough, choking, chest tightness, shortness of breath, wheezing and stridor.    Cardiovascular:  Negative for chest pain, palpitations and leg swelling.   Gastrointestinal:  Negative for abdominal distention, anorexia, change in bowel habit, diarrhea, rectal pain, vomiting and fecal incontinence.   Endocrine: Negative for cold intolerance, heat intolerance, polydipsia, polyphagia and polyuria.   Genitourinary:  Negative for bladder incontinence, dysuria, flank pain, frequency and hot flashes.   Musculoskeletal:  Positive for arthralgias, back pain, leg pain and neck pain.   Integumentary:  Negative for color change and pallor.   Allergic/Immunologic: Negative for immunocompromised state.   Neurological:  Negative for dizziness, vertigo, seizures, syncope, facial asymmetry, speech difficulty, light-headedness, memory loss and coordination difficulties.   Hematological:  Negative for adenopathy. Does not bruise/bleed easily.   Psychiatric/Behavioral:  Negative for agitation, behavioral problems, confusion, decreased concentration, dysphoric mood, hallucinations,  self-injury and suicidal ideas. The patient is not nervous/anxious and is not hyperactive.            Past Medical History:   Diagnosis Date    Asthma     Degenerative lumbar disc     GERD (gastroesophageal reflux disease)     Hyperlipidemia     Hypertension     Low back pain     Sciatica      Past Surgical History:   Procedure Laterality Date    CHOLECYSTECTOMY      HYSTERECTOMY      INJECTION OF ANESTHETIC AGENT AROUND MEDIAL BRANCH NERVES INNERVATING LUMBAR FACET JOINT Bilateral 9/23/2021    Procedure: Bilateral L4-5,5-S1 MBB;  Surgeon: Mabel Zambrano MD;  Location: [facility];  Service: Pain Management;  Laterality: Bilateral;  PT AWARE TO BE TESTED    INJECTION OF ANESTHETIC AGENT AROUND MEDIAL BRANCH NERVES INNERVATING LUMBAR FACET JOINT Bilateral 10/26/2021    Procedure: Block-nerve-medial branch-lumbar, bilateral L4 through S1;  Surgeon: Mabel Zambrano MD;  Location: [facility];  Service: Pain Management;  Laterality: Bilateral;  PT AWARE ON OV TO BE TESTED    INJECTION OF ANESTHETIC AGENT INTO SACROILIAC JOINT Bilateral 11/2/2023    Procedure: BLOCK, SACROILIAC JOINT;  Surgeon: Mabel Zambrano MD;  Location: [facility];  Service: Pain Management;  Laterality: Bilateral;    INJECTION, SACROILIAC JOINT N/A 1/30/2024    Procedure: INJECTION,SACROILIAC JOINT;  Surgeon: Mabel Zambrano MD;  Location: [facility];  Service: Pain Management;  Laterality: N/A;    PARTIAL HYSTERECTOMY      RADIOFREQUENCY ABLATION OF LUMBAR MEDIAL BRANCH NERVE AT SINGLE LEVEL Bilateral 12/9/2021    Procedure: Radiofrequency Ablation, Nerve, Spinal, Lumbar, Medial Branch, 1 Level L4-S1 Right side 1st pre-cert left side in 2 weeks;  Surgeon: Mabel Zambrano MD;  Location: [facility];  Service: Pain Management;  Laterality: Bilateral;  vaccination card scanned in    RADIOFREQUENCY ABLATION OF LUMBAR MEDIAL BRANCH NERVE AT SINGLE LEVEL Left 12/23/2021    Procedure: RADIOFREQUENCY ABLATION,  "NERVE, SPINAL, LUMBAR, MEDIAL BRANCH, 1 LEVEL   LEFT L4-S1 RFTC  (pt already had right side);  Surgeon: Mabel Zambrano MD;  Location: [facility];  Service: Pain Management;  Laterality: Left;  VAC  CARD SCANNED IN    RADIOFREQUENCY ABLATION OF LUMBAR MEDIAL BRANCH NERVE AT SINGLE LEVEL Bilateral 1/17/2023    Procedure: Bilateral L4-5,5-S1 MB RFTC  BOTH SIDES SAME DAY;  Surgeon: Mabel Zambrano MD;  Location: [facility];  Service: Pain Management;  Laterality: Bilateral;    RADIOFREQUENCY THERMOCOAGULATION Bilateral 8/1/2024    Procedure: RADIOFREQUENCY THERMAL COAGULATION SI;  Surgeon: Mabel Zambrano MD;  Location: [facility];  Service: Pain Management;  Laterality: Bilateral;    TUBAL LIGATION       Social History     Socioeconomic History    Marital status: Legally    Tobacco Use    Smoking status: Never    Smokeless tobacco: Never   Substance and Sexual Activity    Alcohol use: Yes     Comment: occasionally     Family History   Problem Relation Name Age of Onset    Hypertension Mother      Diabetes Mellitus Mother      Arthritis Mother      Hypertension Father      Diabetes Mellitus Father       Review of patient's allergies indicates:   Allergen Reactions    Pcn [penicillins]     Macrobid [nitrofurantoin monohyd/m-cryst] Nausea Only        Objective:  Vitals:    11/18/24 0835   BP: 132/88   Pulse: 81   Resp: 18   Weight: 83 kg (183 lb)   Height: 5' 3" (1.6 m)   PainSc:   6                 Physical Exam  Vitals and nursing note reviewed. Exam conducted with a chaperone present.   Constitutional:       General: She is awake. She is not in acute distress.     Appearance: Normal appearance. She is not toxic-appearing or diaphoretic.   HENT:      Head: Normocephalic and atraumatic.      Nose: Nose normal.      Mouth/Throat:      Mouth: Mucous membranes are moist.      Pharynx: Oropharynx is clear.   Eyes:      Conjunctiva/sclera: Conjunctivae normal.      Pupils: Pupils are " equal, round, and reactive to light.   Cardiovascular:      Rate and Rhythm: Normal rate.   Pulmonary:      Effort: Pulmonary effort is normal. No respiratory distress.   Abdominal:      Palpations: Abdomen is soft.      Tenderness: There is no guarding.   Musculoskeletal:         General: Normal range of motion.      Cervical back: Normal range of motion and neck supple. Tenderness present. No rigidity.      Thoracic back: Tenderness present.      Lumbar back: Tenderness present.   Skin:     General: Skin is warm and dry.      Coloration: Skin is not jaundiced or pale.   Neurological:      General: No focal deficit present.      Mental Status: She is alert and oriented to person, place, and time. Mental status is at baseline.      Cranial Nerves: No cranial nerve deficit (II-XII).   Psychiatric:         Mood and Affect: Mood normal.         Behavior: Behavior normal. Behavior is cooperative.         Thought Content: Thought content normal.           FL Fluoro for Pain Management  See OP Notes for results.     IMPRESSION: See OP Notes for results.     This procedure was auto-finalized by: Virtual Radiologist         Office Visit on 07/17/2024   Component Date Value Ref Range Status    POC Amphetamines 07/17/2024 Negative  Negative, Inconclusive Final    POC Barbiturates 07/17/2024 Negative  Negative, Inconclusive Final    POC Benzodiazepines 07/17/2024 Negative  Negative, Inconclusive Final    POC Cocaine 07/17/2024 Negative  Negative, Inconclusive Final    POC THC 07/17/2024 Negative  Negative, Inconclusive Final    POC Methadone 07/17/2024 Negative  Negative, Inconclusive Final    POC Methamphetamine 07/17/2024 Negative  Negative, Inconclusive Final    POC Opiates 07/17/2024 Negative  Negative, Inconclusive Final    POC Oxycodone 07/17/2024 Negative  Negative, Inconclusive Final    POC Phencyclidine 07/17/2024 Negative  Negative, Inconclusive Final    POC Methylenedioxymethamphetamine * 07/17/2024 Negative   Negative, Inconclusive Final    POC Tricyclic Antidepressants 07/17/2024 Negative  Negative, Inconclusive Final    POC Buprenorphine 07/17/2024 Negative   Final     Acceptable 07/17/2024 Yes   Final    POC Temperature (Urine) 07/17/2024 92   Final         Orders Placed This Encounter   Procedures    POCT Urine Drug Screen Presump     Interpretive Information:     Negative:  No drug detected at the cut off level.   Positive:  This result represents presumptive positive for the   tested drug, other substances may yield a positive response other   than the analyte of interest. This result should be utilized for   diagnostic purpose only. Confirmation testing will be performed upon physician request only.            Requested Prescriptions     Signed Prescriptions Disp Refills    traMADoL (ULTRAM) 50 mg tablet 120 tablet 2     Sig: Take 1 tablet (50 mg total) by mouth every 6 (six) hours as needed for Pain.       Assessment:     1. Lumbosacral spondylosis without myelopathy    2. Osteoarthrosis multiple sites, not specified as generalized    3. Cervical radiculopathy    4. Sacroiliitis    5. Encounter for long-term (current) use of medications                 A's of Opioid Risk Assessment  Activity:Patient can perform ADL.   Analgesia:Patients pain is partially controlled by current medication. Patient has tried OTC medications such as Tylenol and Ibuprofen with out relief.   Adverse Effects: Patient denies constipation or sedation.  Aberrant Behavior:  reviewed with no aberrant drug seeking/taking behavior.  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        Plan:    Narcan January 2023      Presumptive drug screen negative, this is expected result, patient takes tramadol, cup does not test for tramadol    Takes gabapentin primary care provider  [employer]  primary care provider       She had bilateral sacroiliac RFTC August 1, 2024   she states she had 80% relief after procedure,   the procedure did help improve her level of function    No new complaints     She states current medications helping her discomfort    Continue current medication    Continue home exercise program as directed    Follow-up 3 months    Dr. Zambrano August 2025    Bring original prescription medication bottles/container/box with labels to each visit

## 2024-11-18 ENCOUNTER — OFFICE VISIT (OUTPATIENT)
Dept: PAIN MEDICINE | Facility: CLINIC | Age: 54
End: 2024-11-18
Payer: COMMERCIAL

## 2024-11-18 VITALS
RESPIRATION RATE: 18 BRPM | WEIGHT: 183 LBS | HEIGHT: 63 IN | SYSTOLIC BLOOD PRESSURE: 132 MMHG | HEART RATE: 81 BPM | DIASTOLIC BLOOD PRESSURE: 88 MMHG | BODY MASS INDEX: 32.43 KG/M2

## 2024-11-18 DIAGNOSIS — M89.49 OSTEOARTHROSIS MULTIPLE SITES, NOT SPECIFIED AS GENERALIZED: Chronic | ICD-10-CM

## 2024-11-18 DIAGNOSIS — M54.12 CERVICAL RADICULOPATHY: Chronic | ICD-10-CM

## 2024-11-18 DIAGNOSIS — M47.817 LUMBOSACRAL SPONDYLOSIS WITHOUT MYELOPATHY: Primary | Chronic | ICD-10-CM

## 2024-11-18 DIAGNOSIS — M46.1 SACROILIITIS: Chronic | ICD-10-CM

## 2024-11-18 DIAGNOSIS — Z79.899 ENCOUNTER FOR LONG-TERM (CURRENT) USE OF MEDICATIONS: ICD-10-CM

## 2024-11-18 LAB

## 2024-11-18 PROCEDURE — 99999 PR PBB SHADOW E&M-EST. PATIENT-LVL V: CPT | Mod: PBBFAC,,, | Performed by: PHYSICIAN ASSISTANT

## 2024-11-18 PROCEDURE — 80305 DRUG TEST PRSMV DIR OPT OBS: CPT | Mod: PBBFAC | Performed by: PHYSICIAN ASSISTANT

## 2024-11-18 PROCEDURE — 3079F DIAST BP 80-89 MM HG: CPT | Mod: CPTII,,, | Performed by: PHYSICIAN ASSISTANT

## 2024-11-18 PROCEDURE — 1159F MED LIST DOCD IN RCRD: CPT | Mod: CPTII,,, | Performed by: PHYSICIAN ASSISTANT

## 2024-11-18 PROCEDURE — 3075F SYST BP GE 130 - 139MM HG: CPT | Mod: CPTII,,, | Performed by: PHYSICIAN ASSISTANT

## 2024-11-18 PROCEDURE — 99214 OFFICE O/P EST MOD 30 MIN: CPT | Mod: S$PBB,,, | Performed by: PHYSICIAN ASSISTANT

## 2024-11-18 PROCEDURE — 99215 OFFICE O/P EST HI 40 MIN: CPT | Mod: PBBFAC | Performed by: PHYSICIAN ASSISTANT

## 2024-11-18 PROCEDURE — 99999PBSHW POCT URINE DRUG SCREEN PRESUMP: Mod: PBBFAC,,,

## 2024-11-18 PROCEDURE — 3008F BODY MASS INDEX DOCD: CPT | Mod: CPTII,,, | Performed by: PHYSICIAN ASSISTANT

## 2024-11-18 RX ORDER — TRAMADOL HYDROCHLORIDE 50 MG/1
50 TABLET ORAL EVERY 6 HOURS PRN
Qty: 120 TABLET | Refills: 2 | Status: SHIPPED | OUTPATIENT
Start: 2024-11-18

## 2025-02-03 NOTE — PROGRESS NOTES
Subjective:         Patient ID: Miriam Winter is a 54 y.o. female.    Chief Complaint: Low-back Pain (Left ) and Leg Pain (left)        Pain  This is a chronic problem. The current episode started more than 1 year ago. The problem occurs daily. The problem has been unchanged. Associated symptoms include arthralgias and neck pain. Pertinent negatives include no anorexia, change in bowel habit, chest pain, chills, coughing, diaphoresis, fever, sore throat, vertigo or vomiting.     Review of Systems   Constitutional:  Negative for activity change, appetite change, chills, diaphoresis, fever and unexpected weight change.   HENT:  Negative for drooling, ear discharge, ear pain, facial swelling, nosebleeds, sore throat, trouble swallowing, voice change and goiter.    Eyes:  Negative for photophobia, pain, discharge, redness and visual disturbance.   Respiratory:  Negative for apnea, cough, choking, chest tightness, shortness of breath, wheezing and stridor.    Cardiovascular:  Negative for chest pain, palpitations and leg swelling.   Gastrointestinal:  Negative for abdominal distention, anorexia, change in bowel habit, diarrhea, rectal pain, vomiting and fecal incontinence.   Endocrine: Negative for cold intolerance, heat intolerance, polydipsia, polyphagia and polyuria.   Genitourinary:  Negative for bladder incontinence, dysuria, flank pain, frequency and hot flashes.   Musculoskeletal:  Positive for arthralgias, back pain, leg pain and neck pain.   Integumentary:  Negative for color change and pallor.   Allergic/Immunologic: Negative for immunocompromised state.   Neurological:  Negative for dizziness, vertigo, seizures, syncope, facial asymmetry, speech difficulty, light-headedness, memory loss and coordination difficulties.   Hematological:  Negative for adenopathy. Does not bruise/bleed easily.   Psychiatric/Behavioral:  Negative for agitation, behavioral problems, confusion, decreased concentration, dysphoric  mood, hallucinations, self-injury and suicidal ideas. The patient is not nervous/anxious and is not hyperactive.            Past Medical History:   Diagnosis Date    Asthma     Degenerative lumbar disc     GERD (gastroesophageal reflux disease)     Hyperlipidemia     Hypertension     Low back pain     Sciatica      Past Surgical History:   Procedure Laterality Date    CHOLECYSTECTOMY      HYSTERECTOMY      INJECTION OF ANESTHETIC AGENT AROUND MEDIAL BRANCH NERVES INNERVATING LUMBAR FACET JOINT Bilateral 9/23/2021    Procedure: Bilateral L4-5,5-S1 MBB;  Surgeon: Mabel Zambrano MD;  Location: [facility];  Service: Pain Management;  Laterality: Bilateral;  PT AWARE TO BE TESTED    INJECTION OF ANESTHETIC AGENT AROUND MEDIAL BRANCH NERVES INNERVATING LUMBAR FACET JOINT Bilateral 10/26/2021    Procedure: Block-nerve-medial branch-lumbar, bilateral L4 through S1;  Surgeon: Mabel Zambrano MD;  Location: [facility];  Service: Pain Management;  Laterality: Bilateral;  PT AWARE ON OV TO BE TESTED    INJECTION OF ANESTHETIC AGENT INTO SACROILIAC JOINT Bilateral 11/2/2023    Procedure: BLOCK, SACROILIAC JOINT;  Surgeon: Mabel Zambrano MD;  Location: [facility];  Service: Pain Management;  Laterality: Bilateral;    INJECTION, SACROILIAC JOINT N/A 1/30/2024    Procedure: INJECTION,SACROILIAC JOINT;  Surgeon: Mabel Zambrano MD;  Location: [facility];  Service: Pain Management;  Laterality: N/A;    PARTIAL HYSTERECTOMY      RADIOFREQUENCY ABLATION OF LUMBAR MEDIAL BRANCH NERVE AT SINGLE LEVEL Bilateral 12/9/2021    Procedure: Radiofrequency Ablation, Nerve, Spinal, Lumbar, Medial Branch, 1 Level L4-S1 Right side 1st pre-cert left side in 2 weeks;  Surgeon: Mabel Zambrano MD;  Location: [facility];  Service: Pain Management;  Laterality: Bilateral;  vaccination card scanned in    RADIOFREQUENCY ABLATION OF LUMBAR MEDIAL BRANCH NERVE AT SINGLE LEVEL Left 12/23/2021    Procedure:  "RADIOFREQUENCY ABLATION, NERVE, SPINAL, LUMBAR, MEDIAL BRANCH, 1 LEVEL   LEFT L4-S1 RFTC  (pt already had right side);  Surgeon: Mabel Zambrano MD;  Location: [facility];  Service: Pain Management;  Laterality: Left;  VAC  CARD SCANNED IN    RADIOFREQUENCY ABLATION OF LUMBAR MEDIAL BRANCH NERVE AT SINGLE LEVEL Bilateral 1/17/2023    Procedure: Bilateral L4-5,5-S1 MB RFTC  BOTH SIDES SAME DAY;  Surgeon: Mabel Zambrano MD;  Location: [facility];  Service: Pain Management;  Laterality: Bilateral;    RADIOFREQUENCY THERMOCOAGULATION Bilateral 8/1/2024    Procedure: RADIOFREQUENCY THERMAL COAGULATION SI;  Surgeon: Mabel Zambrano MD;  Location: [facility];  Service: Pain Management;  Laterality: Bilateral;    TUBAL LIGATION       Social History     Socioeconomic History    Marital status: Legally    Tobacco Use    Smoking status: Never    Smokeless tobacco: Never   Substance and Sexual Activity    Alcohol use: Yes     Comment: occasionally     Family History   Problem Relation Name Age of Onset    Hypertension Mother      Diabetes Mellitus Mother      Arthritis Mother      Hypertension Father      Diabetes Mellitus Father       Review of patient's allergies indicates:   Allergen Reactions    Pcn [penicillins]     Macrobid [nitrofurantoin monohyd/m-cryst] Nausea Only        Objective:  Vitals:    02/12/25 0936 02/12/25 0937   BP: 139/85    Pulse: 82    Resp: 16    Weight: 84.4 kg (186 lb)    Height: 5' 3" (1.6 m)    PainSc:   6   6                   Physical Exam  Vitals and nursing note reviewed. Exam conducted with a chaperone present.   Constitutional:       General: She is awake. She is not in acute distress.     Appearance: Normal appearance. She is not toxic-appearing or diaphoretic.   HENT:      Head: Normocephalic and atraumatic.      Nose: Nose normal.      Mouth/Throat:      Mouth: Mucous membranes are moist.      Pharynx: Oropharynx is clear.   Eyes:      " Conjunctiva/sclera: Conjunctivae normal.      Pupils: Pupils are equal, round, and reactive to light.   Cardiovascular:      Rate and Rhythm: Normal rate.   Pulmonary:      Effort: Pulmonary effort is normal. No respiratory distress.   Abdominal:      Palpations: Abdomen is soft.      Tenderness: There is no guarding.   Musculoskeletal:         General: Normal range of motion.      Cervical back: Normal range of motion and neck supple. Tenderness present. No rigidity.      Thoracic back: Tenderness present.      Lumbar back: Tenderness present.   Skin:     General: Skin is warm and dry.      Coloration: Skin is not jaundiced or pale.   Neurological:      General: No focal deficit present.      Mental Status: She is alert and oriented to person, place, and time. Mental status is at baseline.      Cranial Nerves: No cranial nerve deficit (II-XII).   Psychiatric:         Mood and Affect: Mood normal.         Behavior: Behavior normal. Behavior is cooperative.         Thought Content: Thought content normal.           [place] [facility] for Pain Management  See OP Notes for results.     IMPRESSION: See OP Notes for results.     This procedure was auto-finalized by: Virtual Radiologist         Office Visit on 11/18/2024   Component Date Value Ref Range Status    POC Amphetamines 11/18/2024 Negative  Negative, Inconclusive Final    POC Barbiturates 11/18/2024 Negative  Negative, Inconclusive Final    POC Benzodiazepines 11/18/2024 Negative  Negative, Inconclusive Final    POC Cocaine 11/18/2024 Negative  Negative, Inconclusive Final    POC THC 11/18/2024 Negative  Negative, Inconclusive Final    POC Methadone 11/18/2024 Negative  Negative, Inconclusive Final    POC Methamphetamine 11/18/2024 Negative  Negative, Inconclusive Final    POC Opiates 11/18/2024 Negative  Negative, Inconclusive Final    POC Oxycodone 11/18/2024 Negative  Negative, Inconclusive Final    POC Phencyclidine 11/18/2024 Negative  Negative, Inconclusive Final     POC Methylenedioxymethamphetamine * 11/18/2024 Negative  Negative, Inconclusive Final    POC Tricyclic Antidepressants 11/18/2024 Negative  Negative, Inconclusive Final    POC Buprenorphine 11/18/2024 Negative   Final     Acceptable 11/18/2024 Yes   Final    POC Temperature (Urine) 11/18/2024 92   Final         Orders Placed This Encounter   Procedures    POCT Urine Drug Screen Presump     Interpretive Information:     Negative:  No drug detected at the cut off level.   Positive:  This result represents presumptive positive for the   tested drug, other substances may yield a positive response other   than the analyte of interest. This result should be utilized for   diagnostic purpose only. Confirmation testing will be performed upon physician request only.            Requested Prescriptions     Signed Prescriptions Disp Refills    traMADoL (ULTRAM) 50 mg tablet 120 tablet 2     Sig: Take 1 tablet (50 mg total) by mouth every 6 (six) hours as needed for Pain.       Assessment:     1. Sacroiliitis    2. Osteoarthrosis multiple sites, not specified as generalized    3. Cervical radiculopathy    4. Lumbosacral spondylosis without myelopathy    5. Encounter for long-term (current) use of medications                   A's of Opioid Risk Assessment  Activity:Patient can perform ADL.   Analgesia:Patients pain is partially controlled by current medication. Patient has tried OTC medications such as Tylenol and Ibuprofen with out relief.   Adverse Effects: Patient denies constipation or sedation.  Aberrant Behavior:  reviewed with no aberrant drug seeking/taking behavior.  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        Plan:    Narcan January 2023      Presumptive drug screen negative, this is expected result, patient takes tramadol, cup does not test for  tramadol    Takes gabapentin primary care provider  [employer] primary care provider       She had bilateral sacroiliac RFTC August 1, 2024   she states she had 80% relief after procedure,   the procedure did help improve her level of function    Considering repeating sacroiliac procedure pain is slowly returning    Requesting Toradol injection     Toradol 60 mg IM, tolerated well    She would like to continue with current medication it does help with her discomfort    Continue current medication    Continue home exercise program as directed    Follow-up 3 months    Dr. Zambrano August 2025    Bring original prescription medication bottles/container/box with labels to each visit

## 2025-02-12 ENCOUNTER — OFFICE VISIT (OUTPATIENT)
Dept: PAIN MEDICINE | Facility: CLINIC | Age: 55
End: 2025-02-12
Payer: COMMERCIAL

## 2025-02-12 VITALS
BODY MASS INDEX: 32.96 KG/M2 | WEIGHT: 186 LBS | DIASTOLIC BLOOD PRESSURE: 85 MMHG | SYSTOLIC BLOOD PRESSURE: 139 MMHG | RESPIRATION RATE: 16 BRPM | HEIGHT: 63 IN | HEART RATE: 82 BPM

## 2025-02-12 DIAGNOSIS — M46.1 SACROILIITIS: Primary | Chronic | ICD-10-CM

## 2025-02-12 DIAGNOSIS — M47.817 LUMBOSACRAL SPONDYLOSIS WITHOUT MYELOPATHY: Chronic | ICD-10-CM

## 2025-02-12 DIAGNOSIS — Z79.899 ENCOUNTER FOR LONG-TERM (CURRENT) USE OF MEDICATIONS: ICD-10-CM

## 2025-02-12 DIAGNOSIS — M54.12 CERVICAL RADICULOPATHY: Chronic | ICD-10-CM

## 2025-02-12 DIAGNOSIS — M89.49 OSTEOARTHROSIS MULTIPLE SITES, NOT SPECIFIED AS GENERALIZED: Chronic | ICD-10-CM

## 2025-02-12 PROCEDURE — 99214 OFFICE O/P EST MOD 30 MIN: CPT | Mod: S$PBB,25,, | Performed by: PHYSICIAN ASSISTANT

## 2025-02-12 PROCEDURE — 3008F BODY MASS INDEX DOCD: CPT | Mod: CPTII,,, | Performed by: PHYSICIAN ASSISTANT

## 2025-02-12 PROCEDURE — 80305 DRUG TEST PRSMV DIR OPT OBS: CPT | Mod: PBBFAC | Performed by: PHYSICIAN ASSISTANT

## 2025-02-12 PROCEDURE — 96372 THER/PROPH/DIAG INJ SC/IM: CPT | Mod: PBBFAC | Performed by: PHYSICIAN ASSISTANT

## 2025-02-12 PROCEDURE — 3079F DIAST BP 80-89 MM HG: CPT | Mod: CPTII,,, | Performed by: PHYSICIAN ASSISTANT

## 2025-02-12 PROCEDURE — 99215 OFFICE O/P EST HI 40 MIN: CPT | Mod: PBBFAC | Performed by: PHYSICIAN ASSISTANT

## 2025-02-12 PROCEDURE — 3075F SYST BP GE 130 - 139MM HG: CPT | Mod: CPTII,,, | Performed by: PHYSICIAN ASSISTANT

## 2025-02-12 PROCEDURE — 99999PBSHW PR PBB SHADOW TECHNICAL ONLY FILED TO HB: Mod: PBBFAC,JZ,,

## 2025-02-12 PROCEDURE — 99999 PR PBB SHADOW E&M-EST. PATIENT-LVL V: CPT | Mod: PBBFAC,,, | Performed by: PHYSICIAN ASSISTANT

## 2025-02-12 PROCEDURE — 99999PBSHW POCT URINE DRUG SCREEN PRESUMP: Mod: PBBFAC,,,

## 2025-02-12 PROCEDURE — 1159F MED LIST DOCD IN RCRD: CPT | Mod: CPTII,,, | Performed by: PHYSICIAN ASSISTANT

## 2025-02-12 RX ORDER — KETOROLAC TROMETHAMINE 30 MG/ML
60 INJECTION, SOLUTION INTRAMUSCULAR; INTRAVENOUS
Status: COMPLETED | OUTPATIENT
Start: 2025-02-12 | End: 2025-02-12

## 2025-02-12 RX ORDER — TRAMADOL HYDROCHLORIDE 50 MG/1
50 TABLET ORAL EVERY 6 HOURS PRN
Qty: 120 TABLET | Refills: 2 | Status: SHIPPED | OUTPATIENT
Start: 2025-02-17

## 2025-02-12 RX ADMIN — KETOROLAC TROMETHAMINE 60 MG: 30 INJECTION, SOLUTION INTRAMUSCULAR at 10:02

## 2025-03-31 ENCOUNTER — TELEPHONE (OUTPATIENT)
Dept: PAIN MEDICINE | Facility: CLINIC | Age: 55
End: 2025-03-31
Payer: COMMERCIAL

## 2025-03-31 NOTE — TELEPHONE ENCOUNTER
Attempted to call patient back no answer or voicemail.    ----- Message from Jacqueline sent at 3/31/2025 11:50 AM CDT -----  Regarding: Problem/Concern  Who Called: Miriam Dumont is requesting assistance/information from provider's office.Preferred Method of Contact: Phone CallPatient's Preferred Phone Number on File: [phone] Best Call Back Number, if different:Additional Information:

## 2025-05-01 NOTE — H&P (VIEW-ONLY)
Subjective:         Patient ID: Miriam Winter is a 54 y.o. female.    Chief Complaint: Joint Pain        Pain  This is a chronic problem. The current episode started more than 1 year ago. The problem occurs daily. The problem has been waxing and waning. Associated symptoms include arthralgias and neck pain. Pertinent negatives include no anorexia, change in bowel habit, chest pain, chills, coughing, diaphoresis, fever, sore throat, vertigo or vomiting.     Review of Systems   Constitutional:  Negative for activity change, appetite change, chills, diaphoresis, fever and unexpected weight change.   HENT:  Negative for drooling, ear discharge, ear pain, facial swelling, nosebleeds, sore throat, trouble swallowing, voice change and goiter.    Eyes:  Negative for photophobia, pain, discharge, redness and visual disturbance.   Respiratory:  Negative for apnea, cough, choking, chest tightness, shortness of breath, wheezing and stridor.    Cardiovascular:  Negative for chest pain, palpitations and leg swelling.   Gastrointestinal:  Negative for abdominal distention, anorexia, change in bowel habit, diarrhea, rectal pain, vomiting and fecal incontinence.   Endocrine: Negative for cold intolerance, heat intolerance, polydipsia, polyphagia and polyuria.   Genitourinary:  Negative for bladder incontinence, dysuria, flank pain, frequency and hot flashes.   Musculoskeletal:  Positive for arthralgias, back pain, leg pain and neck pain.   Integumentary:  Negative for color change and pallor.   Allergic/Immunologic: Negative for immunocompromised state.   Neurological:  Negative for dizziness, vertigo, seizures, syncope, facial asymmetry, speech difficulty, light-headedness, coordination difficulties and memory loss.   Hematological:  Negative for adenopathy. Does not bruise/bleed easily.   Psychiatric/Behavioral:  Negative for agitation, behavioral problems, confusion, decreased concentration, dysphoric mood, hallucinations,  self-injury and suicidal ideas. The patient is not nervous/anxious and is not hyperactive.            Past Medical History:   Diagnosis Date    Asthma     Degenerative lumbar disc     GERD (gastroesophageal reflux disease)     Hyperlipidemia     Hypertension     Low back pain     Sciatica      Past Surgical History:   Procedure Laterality Date    CHOLECYSTECTOMY      HYSTERECTOMY      INJECTION OF ANESTHETIC AGENT AROUND MEDIAL BRANCH NERVES INNERVATING LUMBAR FACET JOINT Bilateral 9/23/2021    Procedure: Bilateral L4-5,5-S1 MBB;  Surgeon: Mabel Zambrano MD;  [facility];  Service: Pain Management;  Laterality: Bilateral;  PT AWARE TO BE TESTED    INJECTION OF ANESTHETIC AGENT AROUND MEDIAL BRANCH NERVES INNERVATING LUMBAR FACET JOINT Bilateral 10/26/2021    Procedure: Block-nerve-medial branch-lumbar, bilateral L4 through S1;  Surgeon: Mabel Zambrano MD;  [facility];  Service: Pain Management;  Laterality: Bilateral;  PT AWARE ON OV TO BE TESTED    INJECTION OF ANESTHETIC AGENT INTO SACROILIAC JOINT Bilateral 11/2/2023    Procedure: BLOCK, SACROILIAC JOINT;  Surgeon: Mabel Zambrano MD;  [facility];  Service: Pain Management;  Laterality: Bilateral;    INJECTION, SACROILIAC JOINT N/A 1/30/2024    Procedure: INJECTION,SACROILIAC JOINT;  Surgeon: Mabel Zambrano MD;  [facility];  Service: Pain Management;  Laterality: N/A;    PARTIAL HYSTERECTOMY      RADIOFREQUENCY ABLATION OF LUMBAR MEDIAL BRANCH NERVE AT SINGLE LEVEL Bilateral 12/9/2021    Procedure: Radiofrequency Ablation, Nerve, Spinal, Lumbar, Medial Branch, 1 Level L4-S1 Right side 1st pre-cert left side in 2 weeks;  Surgeon: Mabel Zambrano MD;  [facility];  Service: Pain Management;  Laterality: Bilateral;  vaccination card scanned in    RADIOFREQUENCY ABLATION OF LUMBAR MEDIAL BRANCH NERVE AT SINGLE LEVEL Left 12/23/2021    Procedure: RADIOFREQUENCY ABLATION,  "NERVE, SPINAL, LUMBAR, MEDIAL BRANCH, 1 LEVEL   LEFT L4-S1 RFTC  (pt already had right side);  Surgeon: Mabel Zambrano MD;  Location: [facility];  Service: Pain Management;  Laterality: Left;  VAC  CARD SCANNED IN    RADIOFREQUENCY ABLATION OF LUMBAR MEDIAL BRANCH NERVE AT SINGLE LEVEL Bilateral 1/17/2023    Procedure: Bilateral L4-5,5-S1 MB RFTC  BOTH SIDES SAME DAY;  Surgeon: Mabel Zambrano MD;  Location: [facility];  Service: Pain Management;  Laterality: Bilateral;    RADIOFREQUENCY THERMOCOAGULATION Bilateral 8/1/2024    Procedure: RADIOFREQUENCY THERMAL COAGULATION SI;  Surgeon: Mabel Zambrano MD;  Location: [facility];  Service: Pain Management;  Laterality: Bilateral;    TUBAL LIGATION       Social History     Socioeconomic History    Marital status: Legally    Tobacco Use    Smoking status: Never    Smokeless tobacco: Never   Substance and Sexual Activity    Alcohol use: Yes     Comment: occasionally     Family History   Problem Relation Name Age of Onset    Hypertension Mother      Diabetes Mellitus Mother      Arthritis Mother      Hypertension Father      Diabetes Mellitus Father       Review of patient's allergies indicates:   Allergen Reactions    Pcn [penicillins]     Macrobid [nitrofurantoin monohyd/m-cryst] Nausea Only        Objective:  Vitals:    05/12/25 0923   BP: (!) 168/87   Pulse: 97   Resp: 20   Weight: 88.5 kg (195 lb)   Height: 5' 3" (1.6 m)   PainSc:   6                     Physical Exam  Vitals and nursing note reviewed. Exam conducted with a chaperone present.   Constitutional:       General: She is awake. She is not in acute distress.     Appearance: Normal appearance. She is not toxic-appearing or diaphoretic.   HENT:      Head: Normocephalic and atraumatic.      Nose: Nose normal.      Mouth/Throat:      Mouth: Mucous membranes are moist.      Pharynx: Oropharynx is clear.   Eyes:      Conjunctiva/sclera: Conjunctivae normal.      Pupils: " Pupils are equal, round, and reactive to light.   Cardiovascular:      Rate and Rhythm: Normal rate.   Pulmonary:      Effort: Pulmonary effort is normal. No respiratory distress.   Abdominal:      Palpations: Abdomen is soft.      Tenderness: There is no guarding.   Musculoskeletal:         General: Normal range of motion.      Cervical back: Normal range of motion and neck supple. Tenderness present. No rigidity.      Thoracic back: Tenderness present.      Lumbar back: Tenderness present.   Skin:     General: Skin is warm and dry.      Coloration: Skin is not jaundiced or pale.   Neurological:      General: No focal deficit present.      Mental Status: She is alert and oriented to person, place, and time. Mental status is at baseline.      Cranial Nerves: No cranial nerve deficit (II-XII).   Psychiatric:         Mood and Affect: Mood normal.         Behavior: Behavior normal. Behavior is cooperative.         Thought Content: Thought content normal.           [place] Fluoro for Pain Management  See OP Notes for results.     IMPRESSION: See OP Notes for results.     This procedure was auto-finalized by: Virtual Radiologist         Office Visit on 02/12/2025   Component Date Value Ref Range Status    POC Amphetamines 02/12/2025 Negative  Negative, Inconclusive Final    POC Barbiturates 02/12/2025 Negative  Negative, Inconclusive Final    POC Benzodiazepines 02/12/2025 Negative  Negative, Inconclusive Final    POC Cocaine 02/12/2025 Negative  Negative, Inconclusive Final    POC THC 02/12/2025 Negative  Negative, Inconclusive Final    POC Methadone 02/12/2025 Negative  Negative, Inconclusive Final    POC Methamphetamine 02/12/2025 Negative  Negative, Inconclusive Final    POC Opiates 02/12/2025 Negative  Negative, Inconclusive Final    POC Oxycodone 02/12/2025 Negative  Negative, Inconclusive Final    POC Phencyclidine 02/12/2025 Negative  Negative, Inconclusive Final    POC Methylenedioxymethamphetamine * 02/12/2025  Negative  Negative, Inconclusive Final    POC Tricyclic Antidepressants 02/12/2025 Negative  Negative, Inconclusive Final    POC Buprenorphine 02/12/2025 Negative   Final     Acceptable 02/12/2025 Yes   Final    POC Temperature (Urine) 02/12/2025 90   Final   Office Visit on 11/18/2024   Component Date Value Ref Range Status    POC Amphetamines 11/18/2024 Negative  Negative, Inconclusive Final    POC Barbiturates 11/18/2024 Negative  Negative, Inconclusive Final    POC Benzodiazepines 11/18/2024 Negative  Negative, Inconclusive Final    POC Cocaine 11/18/2024 Negative  Negative, Inconclusive Final    POC THC 11/18/2024 Negative  Negative, Inconclusive Final    POC Methadone 11/18/2024 Negative  Negative, Inconclusive Final    POC Methamphetamine 11/18/2024 Negative  Negative, Inconclusive Final    POC Opiates 11/18/2024 Negative  Negative, Inconclusive Final    POC Oxycodone 11/18/2024 Negative  Negative, Inconclusive Final    POC Phencyclidine 11/18/2024 Negative  Negative, Inconclusive Final    POC Methylenedioxymethamphetamine * 11/18/2024 Negative  Negative, Inconclusive Final    POC Tricyclic Antidepressants 11/18/2024 Negative  Negative, Inconclusive Final    POC Buprenorphine 11/18/2024 Negative   Final     Acceptable 11/18/2024 Yes   Final    POC Temperature (Urine) 11/18/2024 92   Final         Orders Placed This Encounter   Procedures    POCT Urine Drug Screen Presump     Interpretive Information:     Negative:  No drug detected at the cut off level.   Positive:  This result represents presumptive positive for the   tested drug, other substances may yield a positive response other   than the analyte of interest. This result should be utilized for   diagnostic purpose only. Confirmation testing will be performed upon physician request only.       Case Request Operating Room: Radiofrequency Ablation, Nerve, Spinal, Lumbar, Medial Branch, Level L4-S1     Medical Necessity::    Medically Non-Urgent [100]     Case classification:   E - Elective [90]     Is an on-site pathologist required for this procedure?:   N/A         Requested Prescriptions     Signed Prescriptions Disp Refills    traMADoL (ULTRAM) 50 mg tablet 120 tablet 0     Sig: Take 1 tablet (50 mg total) by mouth every 6 (six) hours as needed for Pain.       Assessment:     1. Lumbosacral spondylosis without myelopathy    2. Cervical radiculopathy    3. Sacroiliitis    4. Encounter for long-term (current) use of medications                     A's of Opioid Risk Assessment  Activity:Patient can perform ADL.   Analgesia:Patients pain is partially controlled by current medication. Patient has tried OTC medications such as Tylenol and Ibuprofen with out relief.   Adverse Effects: Patient denies constipation or sedation.  Aberrant Behavior:  reviewed with no aberrant drug seeking/taking behavior.  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        Plan:    Narcan January 2023      Presumptive drug screen negative, this is expected result, patient takes tramadol, cup does not test for tramadol    Takes gabapentin primary care provider  [employer] primary care provider       She had bilateral sacroiliac RFTC August 1, 2024   she states she had 80% relief after procedure,   the procedure did help improve her level of function      Complaint back pain worse with flexion extension rotation lumbar spine ongoing for more than 3 months facet joint in nature     Requesting lumbar procedure    Complaining of right shoulder pain left foot pain bilateral knee pain     Primary care provider performed x-rays right shoulder left foot    Primary care provider believes patient may have a neuroma left foot    Degenerative changes left shoulder  X-ray report under media in patient's primary care provider's  note    After discussing options she would like to proceed with     Discuss options with primary care provider discussing rheumatology/inflammatory arthropathy evaluation    She would like to continue with current medication    Considering changing medication    Continue current medication    Continue home exercise program as directed    Indications for this procedure for this specific patient include the following   - Pt has had symptoms for three months with moderate to severe pain with functional impairment rated of 7/10 pain. Pain is severe enough to affect her quality of life and function  - Pain non-responsive to conservative care.    - Pain predominately axial and not associated with radiculopathy or claudication.    - No non-facet pathology as source of pain.    - Clinical assessment implicates facet joint as putative pain source.    - facet loading maneuver positive  - Pain is exacerbated by extension or prolonged sitting/standing and relieved by rest.    - No unexplained neurologic deficit.    - No history of coagulopathy, infection or unstable medical conditions.    - Pain is causing significant functional limitation resulting in diminished quality of life and impaired age appropriate ADL's.   - Clinical assessment implicates facet joint as putative source of pain  - Repeat injections not done prior to 7 days   - no more than 2 levels will be done    -procedure done prior to surgical consideration  The planned medically necessary  surgical procedure is performed in a hospital outpatient department and not in an ambulatory surgical center due to:     -there is no geographically assessable ambulatory surgery center that has the  necessary equipment and fluoroscopy needed for the procedure     -there is no geographically assessable ambulatory surgical center available at which the physician has privileges     -an ASC's  specific  guideline regarding the individuals weight or health conditions that prevent  the use of an ASC     -Medial branch block performed in consideration for RFTC, not just for therapeutic treatment    -done under fluoro    -will use non pulsed wave high heat radiofrequency    Monitor anesthesia request is medically indicated for the scheduled nerve block procedure due to:  1- needle phobia and anxiety, placing  the patient at risk during the provided service.  2-patient has an ASA class greater than 3 and requires constant presence of an anesthesiologist during the procedure,   3-patient has severe problems hard to lie still  4-patient suffers from chronic pain and is unable to function due to  diminished ADLs    Schedule bilateral lumbar L4 through S1 medial branch RFTC bilateral, lumbosacral spondylosis    Dr. Zambrano     Bring original prescription medication bottles/container/box with labels to each visit

## 2025-05-01 NOTE — PROGRESS NOTES
Subjective:         Patient ID: Miriam Winter is a 54 y.o. female.    Chief Complaint: Joint Pain        Pain  This is a chronic problem. The current episode started more than 1 year ago. The problem occurs daily. The problem has been waxing and waning. Associated symptoms include arthralgias and neck pain. Pertinent negatives include no anorexia, change in bowel habit, chest pain, chills, coughing, diaphoresis, fever, sore throat, vertigo or vomiting.     Review of Systems   Constitutional:  Negative for activity change, appetite change, chills, diaphoresis, fever and unexpected weight change.   HENT:  Negative for drooling, ear discharge, ear pain, facial swelling, nosebleeds, sore throat, trouble swallowing, voice change and goiter.    Eyes:  Negative for photophobia, pain, discharge, redness and visual disturbance.   Respiratory:  Negative for apnea, cough, choking, chest tightness, shortness of breath, wheezing and stridor.    Cardiovascular:  Negative for chest pain, palpitations and leg swelling.   Gastrointestinal:  Negative for abdominal distention, anorexia, change in bowel habit, diarrhea, rectal pain, vomiting and fecal incontinence.   Endocrine: Negative for cold intolerance, heat intolerance, polydipsia, polyphagia and polyuria.   Genitourinary:  Negative for bladder incontinence, dysuria, flank pain, frequency and hot flashes.   Musculoskeletal:  Positive for arthralgias, back pain, leg pain and neck pain.   Integumentary:  Negative for color change and pallor.   Allergic/Immunologic: Negative for immunocompromised state.   Neurological:  Negative for dizziness, vertigo, seizures, syncope, facial asymmetry, speech difficulty, light-headedness, coordination difficulties and memory loss.   Hematological:  Negative for adenopathy. Does not bruise/bleed easily.   Psychiatric/Behavioral:  Negative for agitation, behavioral problems, confusion, decreased concentration, dysphoric mood, hallucinations,  self-injury and suicidal ideas. The patient is not nervous/anxious and is not hyperactive.            Past Medical History:   Diagnosis Date    Asthma     Degenerative lumbar disc     GERD (gastroesophageal reflux disease)     Hyperlipidemia     Hypertension     Low back pain     Sciatica      Past Surgical History:   Procedure Laterality Date    CHOLECYSTECTOMY      HYSTERECTOMY      INJECTION OF ANESTHETIC AGENT AROUND MEDIAL BRANCH NERVES INNERVATING LUMBAR FACET JOINT Bilateral 9/23/2021    Procedure: Bilateral L4-5,5-S1 MBB;  Surgeon: Mabel Zambrano MD;  Location: [facility];  Service: Pain Management;  Laterality: Bilateral;  PT AWARE TO BE TESTED    INJECTION OF ANESTHETIC AGENT AROUND MEDIAL BRANCH NERVES INNERVATING LUMBAR FACET JOINT Bilateral 10/26/2021    Procedure: Block-nerve-medial branch-lumbar, bilateral L4 through S1;  Surgeon: Mabel Zambrano MD;  Location: [facility];  Service: Pain Management;  Laterality: Bilateral;  PT AWARE ON OV TO BE TESTED    INJECTION OF ANESTHETIC AGENT INTO SACROILIAC JOINT Bilateral 11/2/2023    Procedure: BLOCK, SACROILIAC JOINT;  Surgeon: Mabel Zambrano MD;  Location: [facility];  Service: Pain Management;  Laterality: Bilateral;    INJECTION, SACROILIAC JOINT N/A 1/30/2024    Procedure: INJECTION,SACROILIAC JOINT;  Surgeon: Mabel Zambrano MD;  Location: [facility];  Service: Pain Management;  Laterality: N/A;    PARTIAL HYSTERECTOMY      RADIOFREQUENCY ABLATION OF LUMBAR MEDIAL BRANCH NERVE AT SINGLE LEVEL Bilateral 12/9/2021    Procedure: Radiofrequency Ablation, Nerve, Spinal, Lumbar, Medial Branch, 1 Level L4-S1 Right side 1st pre-cert left side in 2 weeks;  Surgeon: Mabel Zambrano MD;  Location: [facility];  Service: Pain Management;  Laterality: Bilateral;  vaccination card scanned in    RADIOFREQUENCY ABLATION OF LUMBAR MEDIAL BRANCH NERVE AT SINGLE LEVEL Left 12/23/2021    Procedure: RADIOFREQUENCY ABLATION,  "NERVE, SPINAL, LUMBAR, MEDIAL BRANCH, 1 LEVEL   LEFT L4-S1 RFTC  (pt already had right side);  Surgeon: Mabel Zambrano MD;  Location: [facility];  Service: Pain Management;  Laterality: Left;  VAC  CARD SCANNED IN    RADIOFREQUENCY ABLATION OF LUMBAR MEDIAL BRANCH NERVE AT SINGLE LEVEL Bilateral 1/17/2023    Procedure: Bilateral L4-5,5-S1 MB RFTC  BOTH SIDES SAME DAY;  Surgeon: Mabel Zambrano MD;  Location: [facility];  Service: Pain Management;  Laterality: Bilateral;    RADIOFREQUENCY THERMOCOAGULATION Bilateral 8/1/2024    Procedure: RADIOFREQUENCY THERMAL COAGULATION SI;  Surgeon: Mabel Zambrano MD;  Location: [facility];  Service: Pain Management;  Laterality: Bilateral;    TUBAL LIGATION       Social History     Socioeconomic History    Marital status: Legally    Tobacco Use    Smoking status: Never    Smokeless tobacco: Never   Substance and Sexual Activity    Alcohol use: Yes     Comment: occasionally     Family History   Problem Relation Name Age of Onset    Hypertension Mother      Diabetes Mellitus Mother      Arthritis Mother      Hypertension Father      Diabetes Mellitus Father       Review of patient's allergies indicates:   Allergen Reactions    Pcn [penicillins]     Macrobid [nitrofurantoin monohyd/m-cryst] Nausea Only        Objective:  Vitals:    05/12/25 0923   BP: (!) 168/87   Pulse: 97   Resp: 20   Weight: 88.5 kg (195 lb)   Height: 5' 3" (1.6 m)   PainSc:   6                     Physical Exam  Vitals and nursing note reviewed. Exam conducted with a chaperone present.   Constitutional:       General: She is awake. She is not in acute distress.     Appearance: Normal appearance. She is not toxic-appearing or diaphoretic.   HENT:      Head: Normocephalic and atraumatic.      Nose: Nose normal.      Mouth/Throat:      Mouth: Mucous membranes are moist.      Pharynx: Oropharynx is clear.   Eyes:      Conjunctiva/sclera: Conjunctivae normal.      Pupils: " Pupils are equal, round, and reactive to light.   Cardiovascular:      Rate and Rhythm: Normal rate.   Pulmonary:      Effort: Pulmonary effort is normal. No respiratory distress.   Abdominal:      Palpations: Abdomen is soft.      Tenderness: There is no guarding.   Musculoskeletal:         General: Normal range of motion.      Cervical back: Normal range of motion and neck supple. Tenderness present. No rigidity.      Thoracic back: Tenderness present.      Lumbar back: Tenderness present.   Skin:     General: Skin is warm and dry.      Coloration: Skin is not jaundiced or pale.   Neurological:      General: No focal deficit present.      Mental Status: She is alert and oriented to person, place, and time. Mental status is at baseline.      Cranial Nerves: No cranial nerve deficit (II-XII).   Psychiatric:         Mood and Affect: Mood normal.         Behavior: Behavior normal. Behavior is cooperative.         Thought Content: Thought content normal.           [place] Fluoro for Pain Management  See OP Notes for results.     IMPRESSION: See OP Notes for results.     This procedure was auto-finalized by: Virtual Radiologist         Office Visit on 02/12/2025   Component Date Value Ref Range Status    POC Amphetamines 02/12/2025 Negative  Negative, Inconclusive Final    POC Barbiturates 02/12/2025 Negative  Negative, Inconclusive Final    POC Benzodiazepines 02/12/2025 Negative  Negative, Inconclusive Final    POC Cocaine 02/12/2025 Negative  Negative, Inconclusive Final    POC THC 02/12/2025 Negative  Negative, Inconclusive Final    POC Methadone 02/12/2025 Negative  Negative, Inconclusive Final    POC Methamphetamine 02/12/2025 Negative  Negative, Inconclusive Final    POC Opiates 02/12/2025 Negative  Negative, Inconclusive Final    POC Oxycodone 02/12/2025 Negative  Negative, Inconclusive Final    POC Phencyclidine 02/12/2025 Negative  Negative, Inconclusive Final    POC Methylenedioxymethamphetamine * 02/12/2025  Negative  Negative, Inconclusive Final    POC Tricyclic Antidepressants 02/12/2025 Negative  Negative, Inconclusive Final    POC Buprenorphine 02/12/2025 Negative   Final     Acceptable 02/12/2025 Yes   Final    POC Temperature (Urine) 02/12/2025 90   Final   Office Visit on 11/18/2024   Component Date Value Ref Range Status    POC Amphetamines 11/18/2024 Negative  Negative, Inconclusive Final    POC Barbiturates 11/18/2024 Negative  Negative, Inconclusive Final    POC Benzodiazepines 11/18/2024 Negative  Negative, Inconclusive Final    POC Cocaine 11/18/2024 Negative  Negative, Inconclusive Final    POC THC 11/18/2024 Negative  Negative, Inconclusive Final    POC Methadone 11/18/2024 Negative  Negative, Inconclusive Final    POC Methamphetamine 11/18/2024 Negative  Negative, Inconclusive Final    POC Opiates 11/18/2024 Negative  Negative, Inconclusive Final    POC Oxycodone 11/18/2024 Negative  Negative, Inconclusive Final    POC Phencyclidine 11/18/2024 Negative  Negative, Inconclusive Final    POC Methylenedioxymethamphetamine * 11/18/2024 Negative  Negative, Inconclusive Final    POC Tricyclic Antidepressants 11/18/2024 Negative  Negative, Inconclusive Final    POC Buprenorphine 11/18/2024 Negative   Final     Acceptable 11/18/2024 Yes   Final    POC Temperature (Urine) 11/18/2024 92   Final         Orders Placed This Encounter   Procedures    POCT Urine Drug Screen Presump     Interpretive Information:     Negative:  No drug detected at the cut off level.   Positive:  This result represents presumptive positive for the   tested drug, other substances may yield a positive response other   than the analyte of interest. This result should be utilized for   diagnostic purpose only. Confirmation testing will be performed upon physician request only.       Case Request Operating Room: Radiofrequency Ablation, Nerve, Spinal, Lumbar, Medial Branch, Level L4-S1     Medical Necessity::    Medically Non-Urgent [100]     Case classification:   E - Elective [90]     Is an on-site pathologist required for this procedure?:   N/A         Requested Prescriptions     Signed Prescriptions Disp Refills    traMADoL (ULTRAM) 50 mg tablet 120 tablet 0     Sig: Take 1 tablet (50 mg total) by mouth every 6 (six) hours as needed for Pain.       Assessment:     1. Lumbosacral spondylosis without myelopathy    2. Cervical radiculopathy    3. Sacroiliitis    4. Encounter for long-term (current) use of medications                     A's of Opioid Risk Assessment  Activity:Patient can perform ADL.   Analgesia:Patients pain is partially controlled by current medication. Patient has tried OTC medications such as Tylenol and Ibuprofen with out relief.   Adverse Effects: Patient denies constipation or sedation.  Aberrant Behavior:  reviewed with no aberrant drug seeking/taking behavior.  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        Plan:    Narcan January 2023      Presumptive drug screen negative, this is expected result, patient takes tramadol, cup does not test for tramadol    Takes gabapentin primary care provider  [employer] primary care provider       She had bilateral sacroiliac RFTC August 1, 2024   she states she had 80% relief after procedure,   the procedure did help improve her level of function      Complaint back pain worse with flexion extension rotation lumbar spine ongoing for more than 3 months facet joint in nature     Requesting lumbar procedure    Complaining of right shoulder pain left foot pain bilateral knee pain     Primary care provider performed x-rays right shoulder left foot    Primary care provider believes patient may have a neuroma left foot    Degenerative changes left shoulder  X-ray report under media in patient's primary care provider's  note    After discussing options she would like to proceed with     Discuss options with primary care provider discussing rheumatology/inflammatory arthropathy evaluation    She would like to continue with current medication    Considering changing medication    Continue current medication    Continue home exercise program as directed    Indications for this procedure for this specific patient include the following   - Pt has had symptoms for three months with moderate to severe pain with functional impairment rated of 7/10 pain. Pain is severe enough to affect her quality of life and function  - Pain non-responsive to conservative care.    - Pain predominately axial and not associated with radiculopathy or claudication.    - No non-facet pathology as source of pain.    - Clinical assessment implicates facet joint as putative pain source.    - facet loading maneuver positive  - Pain is exacerbated by extension or prolonged sitting/standing and relieved by rest.    - No unexplained neurologic deficit.    - No history of coagulopathy, infection or unstable medical conditions.    - Pain is causing significant functional limitation resulting in diminished quality of life and impaired age appropriate ADL's.   - Clinical assessment implicates facet joint as putative source of pain  - Repeat injections not done prior to 7 days   - no more than 2 levels will be done    -procedure done prior to surgical consideration  The planned medically necessary  surgical procedure is performed in a hospital outpatient department and not in an ambulatory surgical center due to:     -there is no geographically assessable ambulatory surgery center that has the  necessary equipment and fluoroscopy needed for the procedure     -there is no geographically assessable ambulatory surgical center available at which the physician has privileges     -an ASC's  specific  guideline regarding the individuals weight or health conditions that prevent  the use of an ASC     -Medial branch block performed in consideration for RFTC, not just for therapeutic treatment    -done under fluoro    -will use non pulsed wave high heat radiofrequency    Monitor anesthesia request is medically indicated for the scheduled nerve block procedure due to:  1- needle phobia and anxiety, placing  the patient at risk during the provided service.  2-patient has an ASA class greater than 3 and requires constant presence of an anesthesiologist during the procedure,   3-patient has severe problems hard to lie still  4-patient suffers from chronic pain and is unable to function due to  diminished ADLs    Schedule bilateral lumbar L4 through S1 medial branch RFTC bilateral, lumbosacral spondylosis    Dr. Zambrano     Bring original prescription medication bottles/container/box with labels to each visit

## 2025-05-02 DIAGNOSIS — M25.561 BILATERAL KNEE PAIN: Primary | ICD-10-CM

## 2025-05-02 DIAGNOSIS — M25.562 BILATERAL KNEE PAIN: Primary | ICD-10-CM

## 2025-05-02 DIAGNOSIS — M25.519 SHOULDER PAIN: ICD-10-CM

## 2025-05-12 ENCOUNTER — OFFICE VISIT (OUTPATIENT)
Dept: PAIN MEDICINE | Facility: CLINIC | Age: 55
End: 2025-05-12
Payer: COMMERCIAL

## 2025-05-12 VITALS
RESPIRATION RATE: 20 BRPM | DIASTOLIC BLOOD PRESSURE: 87 MMHG | HEART RATE: 97 BPM | SYSTOLIC BLOOD PRESSURE: 168 MMHG | WEIGHT: 195 LBS | BODY MASS INDEX: 34.55 KG/M2 | HEIGHT: 63 IN

## 2025-05-12 DIAGNOSIS — M47.817 LUMBOSACRAL SPONDYLOSIS WITHOUT MYELOPATHY: Primary | Chronic | ICD-10-CM

## 2025-05-12 DIAGNOSIS — M54.12 CERVICAL RADICULOPATHY: Chronic | ICD-10-CM

## 2025-05-12 DIAGNOSIS — M46.1 SACROILIITIS: Chronic | ICD-10-CM

## 2025-05-12 DIAGNOSIS — Z79.899 ENCOUNTER FOR LONG-TERM (CURRENT) USE OF MEDICATIONS: ICD-10-CM

## 2025-05-12 LAB

## 2025-05-12 PROCEDURE — 3008F BODY MASS INDEX DOCD: CPT | Mod: CPTII,,, | Performed by: PHYSICIAN ASSISTANT

## 2025-05-12 PROCEDURE — 3077F SYST BP >= 140 MM HG: CPT | Mod: CPTII,,, | Performed by: PHYSICIAN ASSISTANT

## 2025-05-12 PROCEDURE — 3079F DIAST BP 80-89 MM HG: CPT | Mod: CPTII,,, | Performed by: PHYSICIAN ASSISTANT

## 2025-05-12 PROCEDURE — 99215 OFFICE O/P EST HI 40 MIN: CPT | Mod: PBBFAC | Performed by: PHYSICIAN ASSISTANT

## 2025-05-12 PROCEDURE — 99999PBSHW POCT URINE DRUG SCREEN PRESUMP: Mod: PBBFAC,,,

## 2025-05-12 PROCEDURE — 99999 PR PBB SHADOW E&M-EST. PATIENT-LVL V: CPT | Mod: PBBFAC,,, | Performed by: PHYSICIAN ASSISTANT

## 2025-05-12 PROCEDURE — 1159F MED LIST DOCD IN RCRD: CPT | Mod: CPTII,,, | Performed by: PHYSICIAN ASSISTANT

## 2025-05-12 PROCEDURE — 80305 DRUG TEST PRSMV DIR OPT OBS: CPT | Mod: PBBFAC | Performed by: PHYSICIAN ASSISTANT

## 2025-05-12 PROCEDURE — 99214 OFFICE O/P EST MOD 30 MIN: CPT | Mod: S$PBB,,, | Performed by: PHYSICIAN ASSISTANT

## 2025-05-12 RX ORDER — TRAMADOL HYDROCHLORIDE 50 MG/1
50 TABLET, FILM COATED ORAL EVERY 6 HOURS PRN
Qty: 120 TABLET | Refills: 0 | Status: SHIPPED | OUTPATIENT
Start: 2025-05-19

## 2025-05-12 NOTE — PATIENT INSTRUCTIONS

## 2025-06-03 ENCOUNTER — ANESTHESIA EVENT (OUTPATIENT)
Dept: PAIN MEDICINE | Facility: HOSPITAL | Age: 55
End: 2025-06-03
Payer: COMMERCIAL

## 2025-06-03 ENCOUNTER — ANESTHESIA (OUTPATIENT)
Dept: PAIN MEDICINE | Facility: HOSPITAL | Age: 55
End: 2025-06-03
Payer: COMMERCIAL

## 2025-06-03 ENCOUNTER — HOSPITAL ENCOUNTER (OUTPATIENT)
Facility: HOSPITAL | Age: 55
Discharge: HOME | End: 2025-06-03
Attending: PAIN MEDICINE | Admitting: PAIN MEDICINE
Payer: COMMERCIAL

## 2025-06-03 VITALS
HEART RATE: 80 BPM | TEMPERATURE: 99 F | BODY MASS INDEX: 34.73 KG/M2 | OXYGEN SATURATION: 100 % | RESPIRATION RATE: 13 BRPM | WEIGHT: 196 LBS | HEIGHT: 63 IN | SYSTOLIC BLOOD PRESSURE: 163 MMHG | DIASTOLIC BLOOD PRESSURE: 98 MMHG

## 2025-06-03 DIAGNOSIS — M47.817 SPONDYLOSIS OF LUMBOSACRAL REGION WITHOUT MYELOPATHY OR RADICULOPATHY: ICD-10-CM

## 2025-06-03 PROCEDURE — 64636 DESTROY L/S FACET JNT ADDL: CPT | Mod: 50 | Performed by: PAIN MEDICINE

## 2025-06-03 PROCEDURE — 63600175 PHARM REV CODE 636 W HCPCS: Performed by: NURSE ANESTHETIST, CERTIFIED REGISTERED

## 2025-06-03 PROCEDURE — 37000008 HC ANESTHESIA 1ST 15 MINUTES: Performed by: PAIN MEDICINE

## 2025-06-03 PROCEDURE — 63600175 PHARM REV CODE 636 W HCPCS: Performed by: PAIN MEDICINE

## 2025-06-03 PROCEDURE — 64636 DESTROY L/S FACET JNT ADDL: CPT | Mod: 50,,, | Performed by: PAIN MEDICINE

## 2025-06-03 PROCEDURE — 37000009 HC ANESTHESIA EA ADD 15 MINS: Performed by: PAIN MEDICINE

## 2025-06-03 PROCEDURE — 27201423 OPTIME MED/SURG SUP & DEVICES STERILE SUPPLY: Performed by: PAIN MEDICINE

## 2025-06-03 PROCEDURE — 64635 DESTROY LUMB/SAC FACET JNT: CPT | Mod: 50,,, | Performed by: PAIN MEDICINE

## 2025-06-03 PROCEDURE — 27000284 HC CANNULA NASAL: Performed by: NURSE ANESTHETIST, CERTIFIED REGISTERED

## 2025-06-03 PROCEDURE — 64635 DESTROY LUMB/SAC FACET JNT: CPT | Mod: 50 | Performed by: PAIN MEDICINE

## 2025-06-03 RX ORDER — LIDOCAINE HYDROCHLORIDE 20 MG/ML
INJECTION, SOLUTION EPIDURAL; INFILTRATION; INTRACAUDAL; PERINEURAL
Status: DISCONTINUED | OUTPATIENT
Start: 2025-06-03 | End: 2025-06-03

## 2025-06-03 RX ORDER — PROPOFOL 10 MG/ML
VIAL (ML) INTRAVENOUS
Status: DISCONTINUED | OUTPATIENT
Start: 2025-06-03 | End: 2025-06-03

## 2025-06-03 RX ORDER — ORPHENADRINE CITRATE 30 MG/ML
INJECTION INTRAMUSCULAR; INTRAVENOUS
Status: DISCONTINUED | OUTPATIENT
Start: 2025-06-03 | End: 2025-06-03

## 2025-06-03 RX ORDER — SODIUM CHLORIDE 9 MG/ML
INJECTION, SOLUTION INTRAVENOUS CONTINUOUS
Status: DISCONTINUED | OUTPATIENT
Start: 2025-06-03 | End: 2025-06-03 | Stop reason: HOSPADM

## 2025-06-03 RX ORDER — TRIAMCINOLONE ACETONIDE 40 MG/ML
INJECTION, SUSPENSION INTRA-ARTICULAR; INTRAMUSCULAR CODE/TRAUMA/SEDATION MEDICATION
Status: DISCONTINUED | OUTPATIENT
Start: 2025-06-03 | End: 2025-06-03 | Stop reason: HOSPADM

## 2025-06-03 RX ORDER — BUPIVACAINE HYDROCHLORIDE 2.5 MG/ML
INJECTION, SOLUTION INFILTRATION; PERINEURAL CODE/TRAUMA/SEDATION MEDICATION
Status: DISCONTINUED | OUTPATIENT
Start: 2025-06-03 | End: 2025-06-03 | Stop reason: HOSPADM

## 2025-06-03 RX ADMIN — PROPOFOL 75 MG: 10 INJECTION, EMULSION INTRAVENOUS at 11:06

## 2025-06-03 RX ADMIN — PROPOFOL 50 MG: 10 INJECTION, EMULSION INTRAVENOUS at 11:06

## 2025-06-03 RX ADMIN — ORPHENADRINE CITRATE 60 MG: 30 INJECTION INTRAMUSCULAR; INTRAVENOUS at 11:06

## 2025-06-03 RX ADMIN — LIDOCAINE HYDROCHLORIDE 50 MG: 20 INJECTION, SOLUTION EPIDURAL; INFILTRATION; INTRACAUDAL; PERINEURAL at 11:06

## 2025-06-03 NOTE — PLAN OF CARE
Plan:  D/c pt via wheelchair at 1235  Informed pt if does not void in 8 hours to go to ER. Notify if redness, drainage, from injection site or fever over next 3-4 days. Rest and drink plenty of fluids for the remainder of the day. No lifting over 5 lbs. For the remainder of the day. Continue regular medications as prescribed. May take pain medications as prescribed.     Pain improved 100%

## 2025-06-03 NOTE — OP NOTE
Procedure Note    Procedure Date: 6/3/2025    Procedure Performed:  Radiofrequency ablation of the bilateral  L4-5, L5-S1 medial branch nerves utilizing fluoroscopy    Indications: Patient has failed conservative therapy.      Pre-op diagnosis: Lumbosacral Spondylosis    Post-op diagnosis: same    Physician: FLORES Zambrano MD    Anesthesia:MAC    Medications injected:  Pre-lesion, 2ml of 1% lidocaine at each level.  From a mixture of  0.25% bupivacaine and  40mg of kenalog 1ml of this solution was injected at each level post-lesion.    Local anesthetic used: 1% Lidocaine, 10 ml     Estimated Blood Loss:Less than 1cc    IVF:Per Anesthesia    Complications: None    Technique:  The patient was interviewed in the holding area and Risks/Benefits were discussed, including, but not limited to  the possibility of new or different pain, bleeding or infection.   All questions were answered.  The patient understood and accepted risks.  The area of treatment was marked. Consent was reviewed and signed.  A time out was taken to identify the patient, procedure and side of the procedure. The patient was placed in a prone position, then prepped and draped in the usual sterile fashion using ChloraPrep and sterile towels.  The levels were determined under fluoroscopic guidance.   AP and oblique fluoroscopy were used to identify and nash the junctions between the superior articular processes and transverse processes at  bilateral   L4-5, L5-S1.  The bilateral  sacral ala was also identified and marked.  The skin and subcutaneous tissues in these identified areas were anesthetized with 1% lidocaine. A 20-gauge 100 mm [employer] RF needle was advanced towards each of these points under fluoroscopic guidance such that the tip of the needle was positioned posterior to the Neuro-foramen on lateral fluoroscopic view. Each needle was positioned such that, on stimulation, the patient had an appropriate pain response between 0.3-0.5 V, with no motor  response, other than Lumbar Paraspinal Muscles up to 2.0V.  One mL of 2% lidocaine was then injected at each level prior to lesioning, which was performed for 90 seconds at 80°C.  Once the lesion was complete, 1 mL of a solution from a  mixture of 0.25% bupivacaine 3 cc and 40mg kenalog  was injected through each probe. The probes were removed and a sterile Band-Aid dressing was applied to the puncture site.  The patient tolerated the procedure well and was monitored after the procedure.  Patient was given post procedure and discharge instructions to follow at home.  The patient was discharged in a stable condition and accompanied by an adult.

## 2025-06-03 NOTE — DISCHARGE SUMMARY
[facility] - Pain Management  Discharge Note  Short Stay    Procedure(s) (LRB):  Radiofrequency Ablation, Nerve, Spinal, Lumbar, Medial Branch, Level L4-S1 (Bilateral)      OUTCOME: Patient tolerated treatment/procedure well without complication and is now ready for discharge.    DISPOSITION: Home or Self Care    FINAL DIAGNOSIS:  Lumbosacral spondylosis without myelopathy    FOLLOWUP: In clinic    DISCHARGE INSTRUCTIONS:  See nurse's notes     TIME SPENT ON DISCHARGE: 5 minutes

## 2025-06-03 NOTE — BRIEF OP NOTE
The  Discharge Note  Short Stay    Admit Date: 6/3/2025    Discharge Date: 6/3/2025    Attending Physician: Mabel Zmabrano     Discharge Provider: Mabel Zambrano    Diagnosis: Lumbosacral spondylosis without myelopathy    Procedure performed: Bilateral L4-5, L5-S1 MB RFTC under fluoroscopy    Findings: Procedure tolerated well and without complications. Consistent with diagnosis.    EBL: 0cc    Specimens: None    Discharged Condition: Good    Final Diagnoses: Lumbosacral spondylosis without myelopathy [M47.817]    Disposition: Home or Self Care    Hospital Course: No complications, uneventful    Outcome of Hospitalization, Treatment, Procedure, or Surgery:  Patient was admitted for outpatient interventional pain management procedure. The patient tolerated the procedure well with no complications.    Follow up/Patient Instructions:  Follow up as scheduled in Pain Management office in 3-4 weeks.  Patient has received instructions and follow up date and time.    Medications:  Continue previous medications

## 2025-06-16 ENCOUNTER — TELEPHONE (OUTPATIENT)
Dept: PAIN MEDICINE | Facility: CLINIC | Age: 55
End: 2025-06-16
Payer: COMMERCIAL

## 2025-06-16 NOTE — TELEPHONE ENCOUNTER
Call to inform patient like request appt has been change to 6/18 @ 754 but she did not answer neither number or did they have vm box sat up.

## 2025-06-16 NOTE — PROGRESS NOTES
Subjective:         Patient ID: Miriam Winter is a 54 y.o. female.    Chief Complaint: Medication Refill and Follow-up        Pain  This is a chronic problem. The current episode started more than 1 year ago. The problem occurs daily. The problem has been unchanged. Associated symptoms include arthralgias and neck pain. Pertinent negatives include no anorexia, change in bowel habit, chest pain, chills, coughing, diaphoresis, fever, sore throat, vertigo or vomiting.     Review of Systems   Constitutional:  Negative for activity change, appetite change, chills, diaphoresis, fever and unexpected weight change.   HENT:  Negative for drooling, ear discharge, ear pain, facial swelling, nosebleeds, sore throat, trouble swallowing, voice change and goiter.    Eyes:  Negative for photophobia, pain, discharge, redness and visual disturbance.   Respiratory:  Negative for apnea, cough, choking, chest tightness, shortness of breath, wheezing and stridor.    Cardiovascular:  Negative for chest pain, palpitations and leg swelling.   Gastrointestinal:  Negative for abdominal distention, anorexia, change in bowel habit, diarrhea, rectal pain, vomiting and fecal incontinence.   Endocrine: Negative for cold intolerance, heat intolerance, polydipsia, polyphagia and polyuria.   Genitourinary:  Negative for bladder incontinence, dysuria, flank pain, frequency and hot flashes.   Musculoskeletal:  Positive for arthralgias, back pain, leg pain and neck pain.   Integumentary:  Negative for color change and pallor.   Allergic/Immunologic: Negative for immunocompromised state.   Neurological:  Negative for dizziness, vertigo, seizures, syncope, facial asymmetry, speech difficulty, light-headedness, coordination difficulties and memory loss.   Hematological:  Negative for adenopathy. Does not bruise/bleed easily.   Psychiatric/Behavioral:  Negative for agitation, behavioral problems, confusion, decreased concentration, dysphoric mood,  hallucinations, self-injury and suicidal ideas. The patient is not nervous/anxious and is not hyperactive.            Past Medical History:   Diagnosis Date    Asthma     Degenerative lumbar disc     GERD (gastroesophageal reflux disease)     Hyperlipidemia     Hypertension     Low back pain     Sciatica      Past Surgical History:   Procedure Laterality Date    CHOLECYSTECTOMY      HYSTERECTOMY      INJECTION OF ANESTHETIC AGENT AROUND MEDIAL BRANCH NERVES INNERVATING LUMBAR FACET JOINT Bilateral 9/23/2021    Procedure: Bilateral L4-5,5-S1 MBB;  Surgeon: Mabel Zambrano MD;  Location: [facility];  Service: Pain Management;  Laterality: Bilateral;  PT AWARE TO BE TESTED    INJECTION OF ANESTHETIC AGENT AROUND MEDIAL BRANCH NERVES INNERVATING LUMBAR FACET JOINT Bilateral 10/26/2021    Procedure: Block-nerve-medial branch-lumbar, bilateral L4 through S1;  Surgeon: Mabel Zambrano MD;  Location: [facility];  Service: Pain Management;  Laterality: Bilateral;  PT AWARE ON OV TO BE TESTED    INJECTION OF ANESTHETIC AGENT INTO SACROILIAC JOINT Bilateral 11/2/2023    Procedure: BLOCK, SACROILIAC JOINT;  Surgeon: Mabel Zambrano MD;  Location: [facility];  Service: Pain Management;  Laterality: Bilateral;    INJECTION, SACROILIAC JOINT N/A 1/30/2024    Procedure: INJECTION,SACROILIAC JOINT;  Surgeon: Mabel Zambrano MD;  Location: [facility];  Service: Pain Management;  Laterality: N/A;    PARTIAL HYSTERECTOMY      RADIOFREQUENCY ABLATION OF LUMBAR MEDIAL BRANCH NERVE AT SINGLE LEVEL Bilateral 12/9/2021    Procedure: Radiofrequency Ablation, Nerve, Spinal, Lumbar, Medial Branch, 1 Level L4-S1 Right side 1st pre-cert left side in 2 weeks;  Surgeon: Mabel Zambrano MD;  Location: [facility];  Service: Pain Management;  Laterality: Bilateral;  vaccination card scanned in    RADIOFREQUENCY ABLATION OF LUMBAR MEDIAL BRANCH NERVE AT SINGLE LEVEL Left 12/23/2021    Procedure:  "RADIOFREQUENCY ABLATION, NERVE, SPINAL, LUMBAR, MEDIAL BRANCH, 1 LEVEL   LEFT L4-S1 RFTC  (pt already had right side);  Surgeon: Mabel Zambrano MD;  Location: [facility];  Service: Pain Management;  Laterality: Left;  VAC  CARD SCANNED IN    RADIOFREQUENCY ABLATION OF LUMBAR MEDIAL BRANCH NERVE AT SINGLE LEVEL Bilateral 1/17/2023    Procedure: Bilateral L4-5,5-S1 MB RFTC  BOTH SIDES SAME DAY;  Surgeon: Mabel Zambrano MD;  Location: [facility];  Service: Pain Management;  Laterality: Bilateral;    RADIOFREQUENCY ABLATION OF LUMBAR MEDIAL BRANCH NERVE AT SINGLE LEVEL Bilateral 6/3/2025    Procedure: Radiofrequency Ablation, Nerve, Spinal, Lumbar, Medial Branch, Level L4-S1;  Surgeon: Mabel Zambrano MD;  Location: [facility];  Service: Pain Management;  Laterality: Bilateral;    RADIOFREQUENCY THERMOCOAGULATION Bilateral 8/1/2024    Procedure: RADIOFREQUENCY THERMAL COAGULATION SI;  Surgeon: Mabel Zambrano MD;  Location: [facility];  Service: Pain Management;  Laterality: Bilateral;    TUBAL LIGATION       Social History     Socioeconomic History    Marital status: Legally    Tobacco Use    Smoking status: Never    Smokeless tobacco: Never   Substance and Sexual Activity    Alcohol use: Yes     Comment: occasionally     Family History   Problem Relation Name Age of Onset    Hypertension Mother      Diabetes Mellitus Mother      Arthritis Mother      Hypertension Father      Diabetes Mellitus Father       Review of patient's allergies indicates:   Allergen Reactions    Pcn [penicillins]     Macrobid [nitrofurantoin monohyd/m-cryst] Nausea Only        Objective:  Vitals:    06/18/25 1431   BP: (!) 156/84   Pulse: 74   Resp: 18   Weight: 89.4 kg (197 lb)   Height: 5' 3" (1.6 m)   PainSc: 0-No pain                       Physical Exam  Vitals and nursing note reviewed. Exam conducted with a chaperone present.   Constitutional:       General: She is awake. She is not in " acute distress.     Appearance: Normal appearance. She is not toxic-appearing or diaphoretic.   HENT:      Head: Normocephalic and atraumatic.      Nose: Nose normal.      Mouth/Throat:      Mouth: Mucous membranes are moist.      Pharynx: Oropharynx is clear.   Eyes:      Conjunctiva/sclera: Conjunctivae normal.      Pupils: Pupils are equal, round, and reactive to light.   Cardiovascular:      Rate and Rhythm: Normal rate.   Pulmonary:      Effort: Pulmonary effort is normal. No respiratory distress.   Abdominal:      Palpations: Abdomen is soft.      Tenderness: There is no guarding.   Musculoskeletal:         General: Normal range of motion.      Cervical back: Normal range of motion and neck supple. Tenderness present. No rigidity.      Thoracic back: Tenderness present.      Lumbar back: Tenderness present.   Skin:     General: Skin is warm and dry.      Coloration: Skin is not jaundiced or pale.   Neurological:      General: No focal deficit present.      Mental Status: She is alert and oriented to person, place, and time. Mental status is at baseline.      Cranial Nerves: No cranial nerve deficit (II-XII).   Psychiatric:         Mood and Affect: Mood normal.         Behavior: Behavior normal. Behavior is cooperative.         Thought Content: Thought content normal.           [place] Fluoro for Pain Management  See OP Notes for results.     IMPRESSION: See OP Notes for results.     This procedure was auto-finalized by: Virtual Radiologist         Office Visit on 05/12/2025   Component Date Value Ref Range Status    POC Amphetamines 05/12/2025 Negative  Negative, Inconclusive Final    POC Barbiturates 05/12/2025 Negative  Negative, Inconclusive Final    POC Benzodiazepines 05/12/2025 Negative  Negative, Inconclusive Final    POC Cocaine 05/12/2025 Negative  Negative, Inconclusive Final    POC THC 05/12/2025 Negative  Negative, Inconclusive Final    POC Methadone 05/12/2025 Negative  Negative, Inconclusive Final     POC Methamphetamine 05/12/2025 Negative  Negative, Inconclusive Final    POC Opiates 05/12/2025 Negative  Negative, Inconclusive Final    POC Oxycodone 05/12/2025 Negative  Negative, Inconclusive Final    POC Phencyclidine 05/12/2025 Negative  Negative, Inconclusive Final    POC Methylenedioxymethamphetamine * 05/12/2025 Negative  Negative, Inconclusive Final    POC Tricyclic Antidepressants 05/12/2025 Negative  Negative, Inconclusive Final    POC Buprenorphine 05/12/2025 Negative   Final     Acceptable 05/12/2025 Yes   Final    POC Temperature (Urine) 05/12/2025 92   Final   Office Visit on 02/12/2025   Component Date Value Ref Range Status    POC Amphetamines 02/12/2025 Negative  Negative, Inconclusive Final    POC Barbiturates 02/12/2025 Negative  Negative, Inconclusive Final    POC Benzodiazepines 02/12/2025 Negative  Negative, Inconclusive Final    POC Cocaine 02/12/2025 Negative  Negative, Inconclusive Final    POC THC 02/12/2025 Negative  Negative, Inconclusive Final    POC Methadone 02/12/2025 Negative  Negative, Inconclusive Final    POC Methamphetamine 02/12/2025 Negative  Negative, Inconclusive Final    POC Opiates 02/12/2025 Negative  Negative, Inconclusive Final    POC Oxycodone 02/12/2025 Negative  Negative, Inconclusive Final    POC Phencyclidine 02/12/2025 Negative  Negative, Inconclusive Final    POC Methylenedioxymethamphetamine * 02/12/2025 Negative  Negative, Inconclusive Final    POC Tricyclic Antidepressants 02/12/2025 Negative  Negative, Inconclusive Final    POC Buprenorphine 02/12/2025 Negative   Final     Acceptable 02/12/2025 Yes   Final    POC Temperature (Urine) 02/12/2025 90   Final         No orders of the defined types were placed in this encounter.        Requested Prescriptions     Signed Prescriptions Disp Refills    traMADoL (ULTRAM) 50 mg tablet 120 tablet 2     Sig: Take 1 tablet (50 mg total) by mouth every 6 (six) hours as needed for Pain.        Assessment:     1. Cervical radiculopathy    2. Lumbosacral spondylosis without myelopathy    3. Sacroiliitis                       A's of Opioid Risk Assessment  Activity:Patient can perform ADL.   Analgesia:Patients pain is partially controlled by current medication. Patient has tried OTC medications such as Tylenol and Ibuprofen with out relief.   Adverse Effects: Patient denies constipation or sedation.  Aberrant Behavior:  reviewed with no aberrant drug seeking/taking behavior.  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        Plan:    Narcan January 2023      Presumptive drug screen negative, this is expected result, patient takes tramadol, cup does not test for tramadol    Takes gabapentin primary care provider  Celebrex primary care provider       Multiple narcotic prescriptions from dentist  [place] 7.5, 12 tablets, June 6, 2025  Tylenol # 3, 12 tablets, May 12, 2025  [place] 5, 12 tablets March 28, 2025        Follow-up after bilateral sacroiliac RFTC Lluvia 3, 2025  she states she had 80% relief after procedure,   the procedure did help improve her level of function/activities daily living    She states she would like to continue conservative management this time     Current medications helping control her discomfort    She would like to continue with current medication    Continue current medication    Continue home exercise program as directed    Follow-up 3 months    Dr. Zambrano June 2026    Bring original prescription medication bottles/container/box with labels to each visit

## 2025-06-18 ENCOUNTER — OFFICE VISIT (OUTPATIENT)
Dept: PAIN MEDICINE | Facility: CLINIC | Age: 55
End: 2025-06-18
Payer: COMMERCIAL

## 2025-06-18 VITALS
RESPIRATION RATE: 18 BRPM | HEART RATE: 74 BPM | BODY MASS INDEX: 34.91 KG/M2 | HEIGHT: 63 IN | SYSTOLIC BLOOD PRESSURE: 156 MMHG | DIASTOLIC BLOOD PRESSURE: 84 MMHG | WEIGHT: 197 LBS

## 2025-06-18 DIAGNOSIS — M47.817 LUMBOSACRAL SPONDYLOSIS WITHOUT MYELOPATHY: Chronic | ICD-10-CM

## 2025-06-18 DIAGNOSIS — M46.1 SACROILIITIS: Chronic | ICD-10-CM

## 2025-06-18 DIAGNOSIS — M54.12 CERVICAL RADICULOPATHY: Primary | Chronic | ICD-10-CM

## 2025-06-18 PROCEDURE — 3008F BODY MASS INDEX DOCD: CPT | Mod: CPTII,,, | Performed by: PHYSICIAN ASSISTANT

## 2025-06-18 PROCEDURE — 1159F MED LIST DOCD IN RCRD: CPT | Mod: CPTII,,, | Performed by: PHYSICIAN ASSISTANT

## 2025-06-18 PROCEDURE — 99214 OFFICE O/P EST MOD 30 MIN: CPT | Mod: S$PBB,,, | Performed by: PHYSICIAN ASSISTANT

## 2025-06-18 PROCEDURE — 3079F DIAST BP 80-89 MM HG: CPT | Mod: CPTII,,, | Performed by: PHYSICIAN ASSISTANT

## 2025-06-18 PROCEDURE — 99999 PR PBB SHADOW E&M-EST. PATIENT-LVL IV: CPT | Mod: PBBFAC,,, | Performed by: PHYSICIAN ASSISTANT

## 2025-06-18 PROCEDURE — 3077F SYST BP >= 140 MM HG: CPT | Mod: CPTII,,, | Performed by: PHYSICIAN ASSISTANT

## 2025-06-18 PROCEDURE — 99214 OFFICE O/P EST MOD 30 MIN: CPT | Mod: PBBFAC | Performed by: PHYSICIAN ASSISTANT

## 2025-06-18 RX ORDER — TRAMADOL HYDROCHLORIDE 50 MG/1
50 TABLET, FILM COATED ORAL EVERY 6 HOURS PRN
Qty: 120 TABLET | Refills: 2 | Status: SHIPPED | OUTPATIENT
Start: 2025-07-10 | End: 2025-06-18

## 2025-06-18 RX ORDER — TRAMADOL HYDROCHLORIDE 50 MG/1
50 TABLET, FILM COATED ORAL EVERY 6 HOURS PRN
Qty: 120 TABLET | Refills: 2 | Status: SHIPPED | OUTPATIENT
Start: 2025-07-03

## (undated) DEVICE — KIT IV START 849

## (undated) DEVICE — GLOVE SENSICARE PI SURG 6.5

## (undated) DEVICE — GLOVE PROTEXIS PI SYN SURG 6.5

## (undated) DEVICE — SET EXT STD BORE CATH 7.6IN

## (undated) DEVICE — KIT COOLED RF 100MM SGL PROBE

## (undated) DEVICE — SLIPPER FALL PREV YELLOW XLG

## (undated) DEVICE — DRAPE THREE-QUARTER 53X77IN

## (undated) DEVICE — TRAY NERVE BLOCK (KC) PMA

## (undated) DEVICE — TRAY NERVE BLOCK UNIV 10/CA

## (undated) DEVICE — CATH IV JELCO 22GX1 IN

## (undated) DEVICE — KIT IV START

## (undated) DEVICE — APPLICATOR CHLORAPREP HI-LITE TINTED ORANGE 26ML

## (undated) DEVICE — GLOVE SURGICAL PROTEXIS PI SIZE 6.5

## (undated) DEVICE — CATH IV 22G X 1 AUTOGUARD

## (undated) DEVICE — ELECTRODE REM PLYHSV RETURN 9

## (undated) DEVICE — NEEDLE SPINAL 22GX3.5 QUINCHE (KC)

## (undated) DEVICE — DRAPE THREE-QTR REINF 53X77IN

## (undated) DEVICE — SOL CONTINU-FLO SET 2 LAV

## (undated) DEVICE — SET IV SOL CONTIN-FLOW 10 DROP/ML (PRIMARY)

## (undated) DEVICE — NDL SPINAL SPINOCAN 22GX3.5

## (undated) DEVICE — CATH IV INTROCAN 22G X 1

## (undated) DEVICE — CATH IV JELCO 20GX1 1/4IN

## (undated) DEVICE — SLIPPER FALL PREV YEL BARIAT

## (undated) DEVICE — KIT COOLED RF PROBE 17G/100MM COOLIEF

## (undated) DEVICE — APPLICATOR CHLORAPREP ORN 26ML

## (undated) DEVICE — KIT IV START RUSH

## (undated) DEVICE — CATH INTROCAN SAF 2 IV 20GX1IN

## (undated) DEVICE — PAD ELECTROSURGICAL SPL W/CORD

## (undated) DEVICE — SOL WATER STRL IRRIGATION 250ML

## (undated) DEVICE — PAD GROUNDING DISPER ELECTRODE

## (undated) DEVICE — KIT COOLED RF 100MM DBL PROBE

## (undated) DEVICE — DRAPE SURGICAL 3/4 SHEET 52IN X 76IN STERILE

## (undated) DEVICE — SPONGE GAUZE 2X2 STERILE 8 PLY 2/PK

## (undated) DEVICE — Device